# Patient Record
Sex: MALE | Race: WHITE | NOT HISPANIC OR LATINO
[De-identification: names, ages, dates, MRNs, and addresses within clinical notes are randomized per-mention and may not be internally consistent; named-entity substitution may affect disease eponyms.]

---

## 2017-04-10 PROBLEM — Z00.00 ENCOUNTER FOR PREVENTIVE HEALTH EXAMINATION: Status: ACTIVE | Noted: 2017-04-10

## 2017-04-28 ENCOUNTER — APPOINTMENT (OUTPATIENT)
Dept: PULMONOLOGY | Facility: CLINIC | Age: 67
End: 2017-04-28

## 2017-05-09 ENCOUNTER — APPOINTMENT (OUTPATIENT)
Dept: INTERVENTIONAL RADIOLOGY/VASCULAR | Facility: CLINIC | Age: 67
End: 2017-05-09

## 2017-05-09 VITALS
SYSTOLIC BLOOD PRESSURE: 129 MMHG | DIASTOLIC BLOOD PRESSURE: 71 MMHG | HEIGHT: 69 IN | HEART RATE: 77 BPM | BODY MASS INDEX: 21.48 KG/M2 | WEIGHT: 145 LBS | TEMPERATURE: 99.1 F | OXYGEN SATURATION: 95 % | RESPIRATION RATE: 18 BRPM

## 2017-05-09 DIAGNOSIS — Z78.9 OTHER SPECIFIED HEALTH STATUS: ICD-10-CM

## 2017-05-09 RX ORDER — MULTIVITAMIN
TABLET ORAL
Refills: 0 | Status: ACTIVE | COMMUNITY

## 2017-05-16 ENCOUNTER — APPOINTMENT (OUTPATIENT)
Dept: THORACIC SURGERY | Facility: CLINIC | Age: 67
End: 2017-05-16

## 2017-05-16 VITALS
SYSTOLIC BLOOD PRESSURE: 130 MMHG | OXYGEN SATURATION: 94 % | HEART RATE: 72 BPM | HEIGHT: 69 IN | RESPIRATION RATE: 16 BRPM | BODY MASS INDEX: 21.48 KG/M2 | WEIGHT: 145 LBS | DIASTOLIC BLOOD PRESSURE: 78 MMHG

## 2017-06-08 ENCOUNTER — APPOINTMENT (OUTPATIENT)
Dept: PULMONOLOGY | Facility: CLINIC | Age: 67
End: 2017-06-08

## 2017-06-08 VITALS
DIASTOLIC BLOOD PRESSURE: 60 MMHG | OXYGEN SATURATION: 97 % | RESPIRATION RATE: 17 BRPM | BODY MASS INDEX: 21.77 KG/M2 | SYSTOLIC BLOOD PRESSURE: 110 MMHG | HEART RATE: 57 BPM | WEIGHT: 147 LBS | HEIGHT: 69 IN | TEMPERATURE: 97.9 F

## 2017-06-08 RX ORDER — FLUTICASONE FUROATE AND VILANTEROL TRIFENATATE 200; 25 UG/1; UG/1
200-25 POWDER RESPIRATORY (INHALATION)
Refills: 0 | Status: DISCONTINUED | COMMUNITY
End: 2017-06-08

## 2017-06-12 ENCOUNTER — APPOINTMENT (OUTPATIENT)
Dept: PULMONOLOGY | Facility: CLINIC | Age: 67
End: 2017-06-12

## 2017-07-05 ENCOUNTER — OTHER (OUTPATIENT)
Age: 67
End: 2017-07-05

## 2017-07-11 ENCOUNTER — APPOINTMENT (OUTPATIENT)
Dept: THORACIC SURGERY | Facility: CLINIC | Age: 67
End: 2017-07-11

## 2017-07-11 VITALS
WEIGHT: 148 LBS | HEART RATE: 58 BPM | BODY MASS INDEX: 21.92 KG/M2 | SYSTOLIC BLOOD PRESSURE: 130 MMHG | HEIGHT: 69 IN | OXYGEN SATURATION: 98 % | DIASTOLIC BLOOD PRESSURE: 60 MMHG

## 2017-08-02 ENCOUNTER — OUTPATIENT (OUTPATIENT)
Dept: OUTPATIENT SERVICES | Facility: HOSPITAL | Age: 67
LOS: 1 days | End: 2017-08-02
Payer: COMMERCIAL

## 2017-08-02 VITALS
DIASTOLIC BLOOD PRESSURE: 62 MMHG | HEART RATE: 56 BPM | OXYGEN SATURATION: 99 % | TEMPERATURE: 97 F | RESPIRATION RATE: 16 BRPM | WEIGHT: 147.93 LBS | SYSTOLIC BLOOD PRESSURE: 106 MMHG | HEIGHT: 71 IN

## 2017-08-02 DIAGNOSIS — Z98.890 OTHER SPECIFIED POSTPROCEDURAL STATES: Chronic | ICD-10-CM

## 2017-08-02 DIAGNOSIS — R91.1 SOLITARY PULMONARY NODULE: ICD-10-CM

## 2017-08-02 DIAGNOSIS — Z90.89 ACQUIRED ABSENCE OF OTHER ORGANS: Chronic | ICD-10-CM

## 2017-08-02 DIAGNOSIS — Z90.49 ACQUIRED ABSENCE OF OTHER SPECIFIED PARTS OF DIGESTIVE TRACT: Chronic | ICD-10-CM

## 2017-08-02 LAB
BLD GP AB SCN SERPL QL: NEGATIVE — SIGNIFICANT CHANGE UP
RH IG SCN BLD-IMP: NEGATIVE — SIGNIFICANT CHANGE UP

## 2017-08-02 PROCEDURE — 93010 ELECTROCARDIOGRAM REPORT: CPT

## 2017-08-02 RX ORDER — SODIUM CHLORIDE 9 MG/ML
1000 INJECTION, SOLUTION INTRAVENOUS
Qty: 0 | Refills: 0 | Status: DISCONTINUED | OUTPATIENT
Start: 2017-08-07 | End: 2017-08-08

## 2017-08-02 NOTE — H&P PST ADULT - NEGATIVE CARDIOVASCULAR SYMPTOMS
no paroxysmal nocturnal dyspnea/no claudication/no chest pain/no peripheral edema/no orthopnea/no palpitations/no dyspnea on exertion

## 2017-08-02 NOTE — H&P PST ADULT - GASTROINTESTINAL DETAILS
no bruit/nontender/bowel sounds normal/no guarding/soft/no masses palpable/no rigidity/no rebound tenderness/no distention/no organomegaly

## 2017-08-02 NOTE — H&P PST ADULT - NEGATIVE OPHTHALMOLOGIC SYMPTOMS
no diplopia/no discharge R/no blurred vision L/no pain L/no loss of vision L/no discharge L/no blurred vision R/no loss of vision R/no photophobia

## 2017-08-02 NOTE — H&P PST ADULT - RS GEN PE MLT RESP DETAILS PC
no chest wall tenderness/no wheezes/good air movement/respirations non-labored/clear to auscultation bilaterally/airway patent/no rales/no rhonchi/no subcutaneous emphysema/breath sounds equal/no intercostal retractions

## 2017-08-02 NOTE — H&P PST ADULT - NEGATIVE ENMT SYMPTOMS
no tinnitus/no dry mouth/no hearing difficulty/no nasal congestion/no vertigo/no nasal discharge/no nasal obstruction/no post-nasal discharge/no throat pain/no sinus symptoms/no ear pain/no dysphagia

## 2017-08-02 NOTE — H&P PST ADULT - NEGATIVE NEUROLOGICAL SYMPTOMS
no generalized seizures/no tremors/no hemiparesis/no syncope/no confusion/no headache/no paresthesias/no loss of consciousness/no transient paralysis/no loss of sensation/no facial palsy/no weakness/no focal seizures

## 2017-08-02 NOTE — H&P PST ADULT - PROBLEM SELECTOR PLAN 1
Pt is scheduled for flexible bronchoscopy, left video assisted thoracoscopy and lung resection for 8/7/17. Preop instructions, Pepcid, surgical scrub provided. Pt stated understanding. Medical clearance in chart. Pending stress test results.

## 2017-08-02 NOTE — H&P PST ADULT - NSANTHOSAYNRD_GEN_A_CORE
No. JEANINE screening performed.  STOP BANG Legend: 0-2 = LOW Risk; 3-4 = INTERMEDIATE Risk; 5-8 = HIGH Risk

## 2017-08-02 NOTE — H&P PST ADULT - HISTORY OF PRESENT ILLNESS
66 year old male presents to presurgical testing with diagnosis of solitary pulmonary nodule scheduled for flexible bronchoscopy, left video assisted thoracoscopy and lung resection for 8/7/17. Pt reports cough with green sputum onset in 1/2017, treated with antibiotics, with persistent symptoms. CXR revealing COPD, referred for CT chest revealing pulmonary nodules. Pt denies SOB with exertion. PFT done.

## 2017-08-02 NOTE — H&P PST ADULT - PSH
H/O Spinal surgery  L4-L5 5/2006  History of tonsillectomy    S/P scrotal varicocelectomy  two procedures 1985 and 1986

## 2017-08-02 NOTE — H&P PST ADULT - PMH
COPD (chronic obstructive pulmonary disease)    Genital herpes    HLD (hyperlipidemia)    Lumbar disc herniation  s/p lumbar surgery L4-L5  Neutropenia  stable, no intervention  Solitary pulmonary nodule    Thyroid nodule  found incidentally on CT scan COPD (chronic obstructive pulmonary disease)    Genital herpes    HLD (hyperlipidemia)    Lumbar disc herniation  s/p lumbar surgery L4-L5  Neutropenia  longstanding, stable, no intervention  Solitary pulmonary nodule    Thyroid nodule  found incidentally on CT scan

## 2017-08-02 NOTE — H&P PST ADULT - MUSCULOSKELETAL
details… detailed exam normal strength/ROM intact/no joint warmth/no joint erythema/no calf tenderness/no joint swelling

## 2017-08-02 NOTE — H&P PST ADULT - FAMILY HISTORY
Mother  Still living? No  Family history of COPD (chronic obstructive pulmonary disease), Age at diagnosis: Age Unknown

## 2017-08-02 NOTE — H&P PST ADULT - NEGATIVE GENERAL SYMPTOMS
no polydipsia/no fever/no sweating/no malaise/no weight gain/no polyphagia/no weight loss/no anorexia/no fatigue/no polyuria/no chills

## 2017-08-02 NOTE — H&P PST ADULT - NEGATIVE GENERAL GENITOURINARY SYMPTOMS
no dysuria/no incontinence/no flank pain R/no hematuria/normal urinary frequency/no renal colic/no flank pain L/no bladder infections/no urinary hesitancy

## 2017-08-07 ENCOUNTER — RESULT REVIEW (OUTPATIENT)
Age: 67
End: 2017-08-07

## 2017-08-07 ENCOUNTER — INPATIENT (INPATIENT)
Facility: HOSPITAL | Age: 67
LOS: 1 days | Discharge: HOME HEALTH | End: 2017-08-09
Attending: THORACIC SURGERY (CARDIOTHORACIC VASCULAR SURGERY) | Admitting: THORACIC SURGERY (CARDIOTHORACIC VASCULAR SURGERY)
Payer: COMMERCIAL

## 2017-08-07 ENCOUNTER — APPOINTMENT (OUTPATIENT)
Dept: THORACIC SURGERY | Facility: HOSPITAL | Age: 67
End: 2017-08-07
Payer: COMMERCIAL

## 2017-08-07 ENCOUNTER — TRANSCRIPTION ENCOUNTER (OUTPATIENT)
Age: 67
End: 2017-08-07

## 2017-08-07 VITALS
HEART RATE: 58 BPM | OXYGEN SATURATION: 97 % | SYSTOLIC BLOOD PRESSURE: 133 MMHG | WEIGHT: 147.93 LBS | HEIGHT: 71 IN | DIASTOLIC BLOOD PRESSURE: 58 MMHG | TEMPERATURE: 98 F | RESPIRATION RATE: 15 BRPM

## 2017-08-07 DIAGNOSIS — Z98.890 OTHER SPECIFIED POSTPROCEDURAL STATES: Chronic | ICD-10-CM

## 2017-08-07 DIAGNOSIS — Z90.89 ACQUIRED ABSENCE OF OTHER ORGANS: Chronic | ICD-10-CM

## 2017-08-07 DIAGNOSIS — R91.1 SOLITARY PULMONARY NODULE: ICD-10-CM

## 2017-08-07 LAB
GRAM STN WND: SIGNIFICANT CHANGE UP
SPECIMEN SOURCE: SIGNIFICANT CHANGE UP

## 2017-08-07 PROCEDURE — 32668 THORACOSCOPY W/W RESECT DIAG: CPT

## 2017-08-07 PROCEDURE — 31622 DX BRONCHOSCOPE/WASH: CPT

## 2017-08-07 PROCEDURE — 88309 TISSUE EXAM BY PATHOLOGIST: CPT | Mod: 26

## 2017-08-07 PROCEDURE — 88307 TISSUE EXAM BY PATHOLOGIST: CPT | Mod: 26

## 2017-08-07 PROCEDURE — 32674 THORACOSCOPY LYMPH NODE EXC: CPT

## 2017-08-07 PROCEDURE — 32663 THORACOSCOPY W/LOBECTOMY: CPT

## 2017-08-07 PROCEDURE — 88331 PATH CONSLTJ SURG 1 BLK 1SPC: CPT | Mod: 26

## 2017-08-07 PROCEDURE — 71010: CPT | Mod: 26

## 2017-08-07 PROCEDURE — 88305 TISSUE EXAM BY PATHOLOGIST: CPT | Mod: 26

## 2017-08-07 RX ORDER — METOCLOPRAMIDE HCL 10 MG
10 TABLET ORAL ONCE
Qty: 0 | Refills: 0 | Status: DISCONTINUED | OUTPATIENT
Start: 2017-08-07 | End: 2017-08-07

## 2017-08-07 RX ORDER — FAMOTIDINE 10 MG/ML
20 INJECTION INTRAVENOUS DAILY
Qty: 0 | Refills: 0 | Status: DISCONTINUED | OUTPATIENT
Start: 2017-08-07 | End: 2017-08-09

## 2017-08-07 RX ORDER — SIMVASTATIN 20 MG/1
20 TABLET, FILM COATED ORAL AT BEDTIME
Qty: 0 | Refills: 0 | Status: DISCONTINUED | OUTPATIENT
Start: 2017-08-07 | End: 2017-08-09

## 2017-08-07 RX ORDER — ONDANSETRON 8 MG/1
4 TABLET, FILM COATED ORAL EVERY 6 HOURS
Qty: 0 | Refills: 0 | Status: DISCONTINUED | OUTPATIENT
Start: 2017-08-07 | End: 2017-08-09

## 2017-08-07 RX ORDER — HEPARIN SODIUM 5000 [USP'U]/ML
5000 INJECTION INTRAVENOUS; SUBCUTANEOUS EVERY 8 HOURS
Qty: 0 | Refills: 0 | Status: DISCONTINUED | OUTPATIENT
Start: 2017-08-07 | End: 2017-08-09

## 2017-08-07 RX ORDER — DIPHENHYDRAMINE HCL 50 MG
25 CAPSULE ORAL EVERY 4 HOURS
Qty: 0 | Refills: 0 | Status: DISCONTINUED | OUTPATIENT
Start: 2017-08-07 | End: 2017-08-09

## 2017-08-07 RX ORDER — IPRATROPIUM/ALBUTEROL SULFATE 18-103MCG
3 AEROSOL WITH ADAPTER (GRAM) INHALATION EVERY 6 HOURS
Qty: 0 | Refills: 0 | Status: DISCONTINUED | OUTPATIENT
Start: 2017-08-07 | End: 2017-08-09

## 2017-08-07 RX ORDER — HYDROMORPHONE HYDROCHLORIDE 2 MG/ML
0.5 INJECTION INTRAMUSCULAR; INTRAVENOUS; SUBCUTANEOUS
Qty: 0 | Refills: 0 | Status: DISCONTINUED | OUTPATIENT
Start: 2017-08-07 | End: 2017-08-08

## 2017-08-07 RX ORDER — HYDROMORPHONE HYDROCHLORIDE 2 MG/ML
30 INJECTION INTRAMUSCULAR; INTRAVENOUS; SUBCUTANEOUS
Qty: 0 | Refills: 0 | Status: DISCONTINUED | OUTPATIENT
Start: 2017-08-07 | End: 2017-08-08

## 2017-08-07 RX ORDER — NALOXONE HYDROCHLORIDE 4 MG/.1ML
0.1 SPRAY NASAL
Qty: 0 | Refills: 0 | Status: DISCONTINUED | OUTPATIENT
Start: 2017-08-07 | End: 2017-08-09

## 2017-08-07 RX ORDER — ACYCLOVIR SODIUM 500 MG
200 VIAL (EA) INTRAVENOUS DAILY
Qty: 0 | Refills: 0 | Status: DISCONTINUED | OUTPATIENT
Start: 2017-08-07 | End: 2017-08-09

## 2017-08-07 RX ORDER — HEPARIN SODIUM 5000 [USP'U]/ML
5000 INJECTION INTRAVENOUS; SUBCUTANEOUS ONCE
Qty: 0 | Refills: 0 | Status: DISCONTINUED | OUTPATIENT
Start: 2017-08-07 | End: 2017-08-07

## 2017-08-07 RX ORDER — ONDANSETRON 8 MG/1
4 TABLET, FILM COATED ORAL ONCE
Qty: 0 | Refills: 0 | Status: DISCONTINUED | OUTPATIENT
Start: 2017-08-07 | End: 2017-08-07

## 2017-08-07 RX ORDER — DOCUSATE SODIUM 100 MG
100 CAPSULE ORAL THREE TIMES A DAY
Qty: 0 | Refills: 0 | Status: DISCONTINUED | OUTPATIENT
Start: 2017-08-07 | End: 2017-08-09

## 2017-08-07 RX ORDER — HYDROMORPHONE HYDROCHLORIDE 2 MG/ML
0.5 INJECTION INTRAMUSCULAR; INTRAVENOUS; SUBCUTANEOUS
Qty: 0 | Refills: 0 | Status: DISCONTINUED | OUTPATIENT
Start: 2017-08-07 | End: 2017-08-07

## 2017-08-07 RX ORDER — FENTANYL CITRATE 50 UG/ML
25 INJECTION INTRAVENOUS
Qty: 0 | Refills: 0 | Status: DISCONTINUED | OUTPATIENT
Start: 2017-08-07 | End: 2017-08-07

## 2017-08-07 RX ADMIN — HYDROMORPHONE HYDROCHLORIDE 30 MILLILITER(S): 2 INJECTION INTRAMUSCULAR; INTRAVENOUS; SUBCUTANEOUS at 11:45

## 2017-08-07 RX ADMIN — SIMVASTATIN 20 MILLIGRAM(S): 20 TABLET, FILM COATED ORAL at 23:40

## 2017-08-07 RX ADMIN — Medication 3 MILLILITER(S): at 21:54

## 2017-08-07 RX ADMIN — Medication 3 MILLILITER(S): at 16:02

## 2017-08-07 RX ADMIN — HYDROMORPHONE HYDROCHLORIDE 30 MILLILITER(S): 2 INJECTION INTRAMUSCULAR; INTRAVENOUS; SUBCUTANEOUS at 19:26

## 2017-08-07 RX ADMIN — Medication 100 MILLIGRAM(S): at 23:40

## 2017-08-07 RX ADMIN — HEPARIN SODIUM 5000 UNIT(S): 5000 INJECTION INTRAVENOUS; SUBCUTANEOUS at 23:38

## 2017-08-07 RX ADMIN — SODIUM CHLORIDE 30 MILLILITER(S): 9 INJECTION, SOLUTION INTRAVENOUS at 12:00

## 2017-08-07 RX ADMIN — HEPARIN SODIUM 5000 UNIT(S): 5000 INJECTION INTRAVENOUS; SUBCUTANEOUS at 14:15

## 2017-08-07 RX ADMIN — Medication 200 MILLIGRAM(S): at 23:40

## 2017-08-07 RX ADMIN — HYDROMORPHONE HYDROCHLORIDE 30 MILLILITER(S): 2 INJECTION INTRAMUSCULAR; INTRAVENOUS; SUBCUTANEOUS at 18:03

## 2017-08-07 NOTE — ASU PATIENT PROFILE, ADULT - PMH
COPD (chronic obstructive pulmonary disease)    Genital herpes    HLD (hyperlipidemia)    Lumbar disc herniation  s/p lumbar surgery L4-L5  Neutropenia  longstanding, stable, no intervention  Solitary pulmonary nodule    Thyroid nodule  found incidentally on CT scan

## 2017-08-07 NOTE — BRIEF OPERATIVE NOTE - PROCEDURE
Thoracoscopic lobectomy of lung  08/07/2017  Uniportal Left VATS, LLL wedge resection , LLL lobectomy, intercostal nerve block  Active  CSUMMERS

## 2017-08-08 LAB
BUN SERPL-MCNC: 13 MG/DL — SIGNIFICANT CHANGE UP (ref 7–23)
CALCIUM SERPL-MCNC: 9.1 MG/DL — SIGNIFICANT CHANGE UP (ref 8.4–10.5)
CHLORIDE SERPL-SCNC: 100 MMOL/L — SIGNIFICANT CHANGE UP (ref 98–107)
CO2 SERPL-SCNC: 28 MMOL/L — SIGNIFICANT CHANGE UP (ref 22–31)
CREAT SERPL-MCNC: 0.79 MG/DL — SIGNIFICANT CHANGE UP (ref 0.5–1.3)
CULTURE - ACID FAST SMEAR CONCENTRATED: SIGNIFICANT CHANGE UP
GLUCOSE SERPL-MCNC: 120 MG/DL — HIGH (ref 70–99)
HCT VFR BLD CALC: 42.1 % — SIGNIFICANT CHANGE UP (ref 39–50)
HGB BLD-MCNC: 13.5 G/DL — SIGNIFICANT CHANGE UP (ref 13–17)
MCHC RBC-ENTMCNC: 32.1 % — SIGNIFICANT CHANGE UP (ref 32–36)
MCHC RBC-ENTMCNC: 32.1 PG — SIGNIFICANT CHANGE UP (ref 27–34)
MCV RBC AUTO: 100.2 FL — HIGH (ref 80–100)
NRBC # FLD: 0 — SIGNIFICANT CHANGE UP
PLATELET # BLD AUTO: 164 K/UL — SIGNIFICANT CHANGE UP (ref 150–400)
PMV BLD: 11.7 FL — SIGNIFICANT CHANGE UP (ref 7–13)
POTASSIUM SERPL-MCNC: 4.6 MMOL/L — SIGNIFICANT CHANGE UP (ref 3.5–5.3)
POTASSIUM SERPL-SCNC: 4.6 MMOL/L — SIGNIFICANT CHANGE UP (ref 3.5–5.3)
RBC # BLD: 4.2 M/UL — SIGNIFICANT CHANGE UP (ref 4.2–5.8)
RBC # FLD: 13.8 % — SIGNIFICANT CHANGE UP (ref 10.3–14.5)
SODIUM SERPL-SCNC: 140 MMOL/L — SIGNIFICANT CHANGE UP (ref 135–145)
SPECIMEN SOURCE: SIGNIFICANT CHANGE UP
SPECIMEN SOURCE: SIGNIFICANT CHANGE UP
WBC # BLD: 8.54 K/UL — SIGNIFICANT CHANGE UP (ref 3.8–10.5)
WBC # FLD AUTO: 8.54 K/UL — SIGNIFICANT CHANGE UP (ref 3.8–10.5)

## 2017-08-08 PROCEDURE — 71010: CPT | Mod: 26

## 2017-08-08 RX ORDER — TAMSULOSIN HYDROCHLORIDE 0.4 MG/1
0.4 CAPSULE ORAL AT BEDTIME
Qty: 0 | Refills: 0 | Status: DISCONTINUED | OUTPATIENT
Start: 2017-08-08 | End: 2017-08-09

## 2017-08-08 RX ORDER — LANOLIN ALCOHOL/MO/W.PET/CERES
3 CREAM (GRAM) TOPICAL AT BEDTIME
Qty: 0 | Refills: 0 | Status: DISCONTINUED | OUTPATIENT
Start: 2017-08-08 | End: 2017-08-09

## 2017-08-08 RX ORDER — OXYCODONE HYDROCHLORIDE 5 MG/1
10 TABLET ORAL EVERY 4 HOURS
Qty: 0 | Refills: 0 | Status: DISCONTINUED | OUTPATIENT
Start: 2017-08-08 | End: 2017-08-09

## 2017-08-08 RX ORDER — OXYCODONE HYDROCHLORIDE 5 MG/1
5 TABLET ORAL EVERY 4 HOURS
Qty: 0 | Refills: 0 | Status: DISCONTINUED | OUTPATIENT
Start: 2017-08-08 | End: 2017-08-09

## 2017-08-08 RX ORDER — KETOROLAC TROMETHAMINE 30 MG/ML
30 SYRINGE (ML) INJECTION ONCE
Qty: 0 | Refills: 0 | Status: DISCONTINUED | OUTPATIENT
Start: 2017-08-08 | End: 2017-08-08

## 2017-08-08 RX ORDER — ACETAMINOPHEN 500 MG
650 TABLET ORAL EVERY 6 HOURS
Qty: 0 | Refills: 0 | Status: DISCONTINUED | OUTPATIENT
Start: 2017-08-08 | End: 2017-08-09

## 2017-08-08 RX ADMIN — Medication 200 MILLIGRAM(S): at 13:37

## 2017-08-08 RX ADMIN — Medication 100 MILLIGRAM(S): at 13:37

## 2017-08-08 RX ADMIN — HEPARIN SODIUM 5000 UNIT(S): 5000 INJECTION INTRAVENOUS; SUBCUTANEOUS at 13:37

## 2017-08-08 RX ADMIN — Medication 30 MILLIGRAM(S): at 11:23

## 2017-08-08 RX ADMIN — HEPARIN SODIUM 5000 UNIT(S): 5000 INJECTION INTRAVENOUS; SUBCUTANEOUS at 22:45

## 2017-08-08 RX ADMIN — Medication 3 MILLILITER(S): at 21:59

## 2017-08-08 RX ADMIN — Medication 30 MILLIGRAM(S): at 10:53

## 2017-08-08 RX ADMIN — FAMOTIDINE 20 MILLIGRAM(S): 10 INJECTION INTRAVENOUS at 13:37

## 2017-08-08 RX ADMIN — Medication 3 MILLILITER(S): at 09:37

## 2017-08-08 RX ADMIN — Medication 3 MILLILITER(S): at 16:18

## 2017-08-08 RX ADMIN — SIMVASTATIN 20 MILLIGRAM(S): 20 TABLET, FILM COATED ORAL at 22:45

## 2017-08-08 RX ADMIN — Medication 100 MILLIGRAM(S): at 22:45

## 2017-08-08 RX ADMIN — HEPARIN SODIUM 5000 UNIT(S): 5000 INJECTION INTRAVENOUS; SUBCUTANEOUS at 05:17

## 2017-08-08 RX ADMIN — Medication 100 MILLIGRAM(S): at 05:17

## 2017-08-08 RX ADMIN — TAMSULOSIN HYDROCHLORIDE 0.4 MILLIGRAM(S): 0.4 CAPSULE ORAL at 22:45

## 2017-08-08 RX ADMIN — HYDROMORPHONE HYDROCHLORIDE 30 MILLILITER(S): 2 INJECTION INTRAMUSCULAR; INTRAVENOUS; SUBCUTANEOUS at 07:17

## 2017-08-08 NOTE — DISCHARGE NOTE ADULT - INSTRUCTIONS
Resume usual diet pt is to follow up with MD as advised, take meds as ordered, call if any signs of infection at chest tube site such as fever, oozing. Eat a heart healthy diet , gradually increase activity.

## 2017-08-08 NOTE — DISCHARGE NOTE ADULT - CARE PROVIDER_API CALL
Alberto Tretn), Surgery; Thoracic Surgery  [id number] [place] [address]  [place] [address]  Phone: [phone]  Fax: [phone]    Rio Fernandez), Internal Medicine  [address] [place] [id number]  Phone: [phone]  Fax: [phone]

## 2017-08-08 NOTE — PROGRESS NOTE ADULT - SUBJECTIVE AND OBJECTIVE BOX
Anesthesia Pain Management Service    SUBJECTIVE: Patient is doing well with IV PCA and no significant problems reported.    Pain Scale Score	At rest: ___ 	With Activity: ___ 	[X ] Refer to charted pain scores    THERAPY:    [ ] IV PCA Morphine		[ ] 5 mg/mL	[ ] 1 mg/mL  [X ] IV PCA Hydromorphone	[ ] 5 mg/mL	[X ] 1 mg/mL  [ ] IV PCA Fentanyl		[ ] 50 micrograms/mL    Demand dose __0.2_ lockout __6_ (minutes) Continuous Rate _0__ Total: ___  Daily      MEDICATIONS  (STANDING):  lactated ringers. 1000 milliLiter(s) (30 mL/Hr) IV Continuous <Continuous>  heparin  Injectable 5000 Unit(s) SubCutaneous every 8 hours  docusate sodium 100 milliGRAM(s) Oral three times a day  simvastatin 20 milliGRAM(s) Oral at bedtime  acyclovir   Capsule 200 milliGRAM(s) Oral daily  famotidine    Tablet 20 milliGRAM(s) Oral daily  ALBUTerol/ipratropium for Nebulization 3 milliLiter(s) Nebulizer every 6 hours    MEDICATIONS  (PRN):  naloxone Injectable 0.1 milliGRAM(s) IV Push every 3 minutes PRN For ANY of the following changes in patient status:  A. RR LESS THAN 10 breaths per minute, B. Oxygen saturation LESS THAN 90%, C. Sedation score of 6  ondansetron Injectable 4 milliGRAM(s) IV Push every 6 hours PRN Nausea  diphenhydrAMINE   Injectable 25 milliGRAM(s) IV Push every 4 hours PRN Pruritus  oxyCODONE    IR 5 milliGRAM(s) Oral every 4 hours PRN Moderate Pain (4 - 6)  oxyCODONE    IR 10 milliGRAM(s) Oral every 4 hours PRN Severe Pain (7 - 10)  acetaminophen   Tablet. 650 milliGRAM(s) Oral every 6 hours PRN Mild Pain (1 - 3)      OBJECTIVE:    Sedation Score:	[ X] Alert	[ ] Drowsy 	[ ] Arousable	[ ] Asleep	[ ] Unresponsive    Side Effects:	[X ] None	[ ] Nausea	[ ] Vomiting	[ ] Pruritus  		[ ] Other:    Vital Signs Last 24 Hrs  T(C): 36.4 (08 Aug 2017 12:43), Max: 36.9 (07 Aug 2017 20:24)  T(F): 97.6 (08 Aug 2017 12:43), Max: 98.4 (07 Aug 2017 20:24)  HR: 55 (08 Aug 2017 12:43) (55 - 77)  BP: 146/58 (08 Aug 2017 12:43) (116/58 - 146/58)  BP(mean): --  RR: 18 (08 Aug 2017 12:43) (11 - 18)  SpO2: 90% (08 Aug 2017 12:43) (90% - 100%)    ASSESSMENT/ PLAN    Therapy to  be:	[ ] Continue   [ X] Discontinued   [X ] Change to prn Analgesics    Documentation and Verification of current medications:   [X] Done	[ ] Not done, not elligible    Comments: PRN Oral/IV opioids and/or Adjuvant medication to be ordered at this point.

## 2017-08-08 NOTE — DISCHARGE NOTE ADULT - CARE PLAN
Principal Discharge DX:	Solitary pulmonary nodule  Goal:	Wound healing; Follow up final pathology for treatment plan  Instructions for follow-up, activity and diet:	Diet and activity as above; Follow up with Dr Trent in 2 weeks.  Call for an appointment and bring a chest X-ray with you. Principal Discharge DX:	Solitary pulmonary nodule  Goal:	Wound healing; Follow up final pathology for treatment plan  Instructions for follow-up, activity and diet:	Walk 4-5 x per day. Increase as tolerated. You may climb stairs. Continue to use incentive spirometer. You may remove all dressings tomorrow morning then begin to shower daily. Leave wounds uncovered. Suture will be removed in office.   See Dr. Trent in 2 weeks. Call for an apt. [phone]. Have a chest xray done prior to that apt and then bring those images with you.  Secondary Diagnosis:	Urinary retention  Goal:	Successful removal of urine catheter.  Instructions for follow-up, activity and diet:	Take Flomax as directed. Follow up with the [facility] next week for possible catheter removal. Call for an apt. [phone] Principal Discharge DX:	Solitary pulmonary nodule  Goal:	Wound healing; Follow up final pathology for treatment plan  Instructions for follow-up, activity and diet:	Walk 4-5 x per day. Increase as tolerated. You may climb stairs. Continue to use incentive spirometer. You may remove all dressings tomorrow morning then begin to shower daily. Leave wounds uncovered. Suture will be removed in office.   See Dr. Trent in 2 weeks. Call for an apt. [phone]. Have a chest xray done prior to that apt and then bring those images with you.  Secondary Diagnosis:	Urinary retention  Goal:	Successful removal of urine catheter.  Instructions for follow-up, activity and diet:	Take Flomax as directed. Follow up with the [facility] next week for possible catheter removal. Call for an apt. [phone] Principal Discharge DX:	Solitary pulmonary nodule  Goal:	Wound healing; Follow up final pathology for treatment plan  Instructions for follow-up, activity and diet:	Walk 4-5 x per day. Increase as tolerated. You may climb stairs. Continue to use incentive spirometer. You may remove all dressings tomorrow morning then begin to shower daily. Leave wounds uncovered. Suture will be removed in office.   See Dr. Trent in 2 weeks. Call for an apt. [phone]. Have a chest xray done prior to that apt and then bring those images with you.  Secondary Diagnosis:	Urinary retention  Goal:	Successful removal of urine catheter.  Instructions for follow-up, activity and diet:	Take Flomax as directed. Follow up with the [facility] next week for possible catheter removal. Call for an apt. [phone] Principal Discharge DX:	Solitary pulmonary nodule  Goal:	Wound healing; Follow up final pathology for treatment plan  Instructions for follow-up, activity and diet:	Walk 4-5 x per day. Increase as tolerated. You may climb stairs. Continue to use incentive spirometer. You may remove all dressings tomorrow morning then begin to shower daily. Leave wounds uncovered. Suture will be removed in office.   See Dr. Trent in 2 weeks. Call for an apt. [phone]. Have a chest xray done prior to that apt and then bring those images with you.  Secondary Diagnosis:	Urinary retention  Goal:	Successful removal of urine catheter.  Instructions for follow-up, activity and diet:	Take Flomax as directed. Follow up with the [facility] next week for possible catheter removal. Call for an apt. [phone] Principal Discharge DX:	Solitary pulmonary nodule  Goal:	Wound healing; Follow up final pathology for treatment plan  Instructions for follow-up, activity and diet:	Walk 4-5 x per day. Increase as tolerated. You may climb stairs. Continue to use incentive spirometer. You may remove all dressings tomorrow morning then begin to shower daily. Leave wounds uncovered. Suture will be removed in office.   See Dr. Trent in 2 weeks. Call for an apt. [phone]. Have a chest xray done prior to that apt and then bring those images with you.  Secondary Diagnosis:	Urinary retention  Goal:	Successful removal of urine catheter.  Instructions for follow-up, activity and diet:	Take Flomax as directed. Follow up with the [facility] next week for possible catheter removal. Call for an apt. [phone] Principal Discharge DX:	Solitary pulmonary nodule  Goal:	Wound healing; Follow up final pathology for treatment plan  Instructions for follow-up, activity and diet:	Walk 4-5 x per day. Increase as tolerated. You may climb stairs. Continue to use incentive spirometer. You may remove all dressings tomorrow morning then begin to shower daily. Leave wounds uncovered. Suture will be removed in office.   See Dr. Trent in 2 weeks. Call for an apt. [phone]. Have a chest xray done prior to that apt and then bring those images with you.  Secondary Diagnosis:	Urinary retention  Goal:	Successful removal of urine catheter.  Instructions for follow-up, activity and diet:	Take Flomax as directed. Follow up with the [facility] next week for possible catheter removal. Call for an apt. [phone]

## 2017-08-08 NOTE — DISCHARGE NOTE ADULT - HOME CARE AGENCY
[facility] [phone] initial visit will be day after discharge home. A nurse will call prior to the home visit.

## 2017-08-08 NOTE — DISCHARGE NOTE ADULT - CARE PROVIDERS DIRECT ADDRESSES
[email or web address]

## 2017-08-08 NOTE — PROGRESS NOTE ADULT - SUBJECTIVE AND OBJECTIVE BOX
Subjective:    Vital Signs:Hemodynamically stable and afebrile  Vital Signs Last 24 Hrs  T(C): 36.7 (08-08-17 @ 17:06), Max: 36.9 (08-07-17 @ 20:24)  T(F): 98.1 (08-08-17 @ 17:06), Max: 98.4 (08-07-17 @ 20:24)  HR: 64 (08-08-17 @ 17:06) (55 - 75)  BP: 125/47 (08-08-17 @ 17:06) (116/59 - 146/58)  RR: 18 (08-08-17 @ 17:06) (17 - 18)  SpO2: 96% (08-08-17 @ 17:06) (90% - 100%) on (O2)    Telemetry/Alarms:  General: WN/WD NAD  Neurology: Awake, nonfocal, THOMAS x 4  Eyes: Scleras clear, PERRLA/ EOMI, Gross vision intact  ENT:Gross hearing intact, grossly patent pharynx, no stridor  Neck: Neck supple, trachea midline, No JVD,   Respiratory: CTA B/L decrease Lt base,   CV: RRR, S1S2, no murmurs, rubs or gallops  Abdominal: Soft, NT, ND +BS,   Extremities: No edema, + peripheral pulses, no calf pain or tenderness  Skin: No Rashes, Hematoma, Ecchymosis  Lymphatic: No Neck, axilla, groin LAD  Psych: Oriented x 3, normal affect  Incisions: No S-S of infection  Tubes: Lt Chest tube to H20 seal no air leak + zero drainage  Relevant labs, radiology->Left chest tube position is unchanged overlying the left apex.  The lungs are clear.There is no evidence of pleural effusion or pneumothorax.  The cardiomediastinal silhouette size cannot be accurately assessed on this  projection. No pneumothorax.                 and Medications reviewed                        13.5   8.54  )-----------( 164      ( 08 Aug 2017 06:38 )             42.1     08-08    140  |  100  |  13  ----------------------------<  120<H>  4.6   |  28  |  0.79    Ca    9.1      08 Aug 2017 06:38        MEDICATIONS  (STANDING):  heparin  Injectable 5000 Unit(s) SubCutaneous every 8 hours  docusate sodium 100 milliGRAM(s) Oral three times a day  simvastatin 20 milliGRAM(s) Oral at bedtime  acyclovir   Capsule 200 milliGRAM(s) Oral daily  famotidine    Tablet 20 milliGRAM(s) Oral daily  ALBUTerol/ipratropium for Nebulization 3 milliLiter(s) Nebulizer every 6 hours  tamsulosin 0.4 milliGRAM(s) Oral at bedtime    MEDICATIONS  (PRN):  naloxone Injectable 0.1 milliGRAM(s) IV Push every 3 minutes PRN For ANY of the following changes in patient status:  A. RR LESS THAN 10 breaths per minute, B. Oxygen saturation LESS THAN 90%, C. Sedation score of 6  ondansetron Injectable 4 milliGRAM(s) IV Push every 6 hours PRN Nausea  diphenhydrAMINE   Injectable 25 milliGRAM(s) IV Push every 4 hours PRN Pruritus  oxyCODONE    IR 5 milliGRAM(s) Oral every 4 hours PRN Moderate Pain (4 - 6)  oxyCODONE    IR 10 milliGRAM(s) Oral every 4 hours PRN Severe Pain (7 - 10)  acetaminophen   Tablet. 650 milliGRAM(s) Oral every 6 hours PRN Mild Pain (1 - 3)    Pertinent Physical Exam  I&O's Summary    07 Aug 2017 07:01  -  08 Aug 2017 07:00  --------------------------------------------------------  IN: 500 mL / OUT: 500 mL / NET: 0 mL    08 Aug 2017 07:01  -  08 Aug 2017 20:14  --------------------------------------------------------  IN: 0 mL / OUT: 1020 mL / NET: -1020 mL        Assessment  66y Male  w/ PAST MEDICAL & SURGICAL HISTORY:  Thyroid nodule: found incidentally on CT scan  Solitary pulmonary nodule  COPD (chronic obstructive pulmonary disease)  Lumbar disc herniation: s/p lumbar surgery L4-L5  Neutropenia: longstanding, stable, no intervention  Genital herpes  HLD (hyperlipidemia)  S/P scrotal varicocelectomy: two procedures 1985 and 1986  History of tonsillectomy  H/O Spinal surgery: L4-L5 5/2006  admitted with complaints of Patient is a 66y old  Male who presents with a chief complaint of Left VATS for a pulmonary nodule (08 Aug 2017 00:34)  .  On 8/7/17, patient underwent Thoracoscopic Lt.lobectomy   Postoperative course/issues: On POD#1 Pt voided 100ml, 250ml, of urine and c/o pain. Bladder scan ->900ml of urine. La insertion failed x3. Urology consult called and a #18F coude placed by resident without difficulty. Lt chest tube d/c'd, a follow up cxr -> sm Lt apical ptx. Will repeat cxr in am.      PLAN  D/C Chest tube w f/u cxr  Neuro: Pain management  Pulm: Encourage coughing, deep breathing and use of incentive spirometry. Wean off supplemental oxygen as able. Daily CXR.   Cardio: Monitor telemetry/alarms  GI: Tolerating diet. Continue stool softeners.  Renal: monitor urine output, supplement electrolytes as needed  Vasc: Heparin SC/SCDs for DVT prophylaxis  Heme: Stable H/H. .   ID: Off antibiotics. Stable.  Therapy: OOB/ambulate  Tubes: Monitor Chest tube output  Disposition: Aim to D/C to home on 8/9 w la.  Discussed with Cardiothoracic Team at AM rounds.

## 2017-08-08 NOTE — DISCHARGE NOTE ADULT - HOSPITAL COURSE
66 year old male was diagnosed with a solitary pulmonary nodule and underwent a flexible bronchoscopy, left video assisted thoracoscopy,  LLL wedge resection, and LLLobectomy on 8/7/17. Pt reports having had a cough with green sputum in 1/2017 that persisted despite treatment with antibiotics.  A CXR revealed COPD and a CT chest revealed a pulmonary nodule. Post-op the pt did well and was discharged home after his chest tube was removed. 66 year old male was diagnosed with a solitary pulmonary nodule and underwent a flexible bronchoscopy, left video assisted thoracoscopy,  LLL wedge resection, and LLLobectomy on 8/7/17. Pt reports having had a cough with green sputum in 1/2017 that persisted despite treatment with antibiotics.  A CXR revealed COPD and a CT chest revealed a pulmonary nodule. Post-op pt had urinary retention, Camacho cath needed to be inserted by Urology. Recommended that he be discharged home onFlomax with plans for TOV as outpt. CT was removed and CXR was stable. Pt. ambulating frequently. C.o some phlegm and rhonchi, relieved w chest PT and cough. VATS site c/d/i. Labs stable. Pt. given teaching prior to d/c home. Cleared for discharge by Dr. Trent.

## 2017-08-08 NOTE — DISCHARGE NOTE ADULT - PATIENT PORTAL LINK FT
“You can access the [facility] Patient Portal, offered by [facility], by registering with the following website: [email or web address]”

## 2017-08-08 NOTE — PROCEDURE NOTE - NSPROCDETAILS_GEN_ALL_CORE
sterile technique, non-indwelling urinary device inserted/a urinary catheter insertion kit was used for all insertion materials/prior to placement, an active physician order for the placement of a urinary catheter was verified/sterile technique, indwelling urinary device inserted

## 2017-08-08 NOTE — PROCEDURE NOTE - NSURITECHNIQUE_GU_A_CORE
Sterile gloves were worn for the duration of the procedure/The catheter was appropriately lubricated/The collection bag is below the level of the patient and urinary bladder/All applicable medical record documentation is completed/A sterile drape was used to cover all adjacent areas/Proper hand hygiene was performed/The site was cleaned with soap/water and sterile solution (betadine)/The catheter was secured with a securement device (e.g. StatLock)/The urinary drainage system is closed at the end of the procedure

## 2017-08-08 NOTE — PROGRESS NOTE ADULT - SUBJECTIVE AND OBJECTIVE BOX
Pt is without complaints s/p L VATS, LLL wedge resection, LLLobectomy.  He voided and is tolerating his diet    On exam, dressings are clean and dry.  Lungs are clear and the chest tube is to water seal without an air leak and without drainage.     CXR- no PTX    Stable s/p L VATS, LLL wedge, LLLobectomy    Plan for repeat CXR in AM 8/8  Possible chest tube removal and possible discharge home 8/8 or 8/9

## 2017-08-08 NOTE — DISCHARGE NOTE ADULT - MEDICATION SUMMARY - MEDICATIONS TO STOP TAKING
I will STOP taking the medications listed below when I get home from the hospital:    Aleve 220 mg oral tablet  --  by mouth once a day, As Needed, last dose 7/23

## 2017-08-08 NOTE — DISCHARGE NOTE ADULT - MEDICATION SUMMARY - MEDICATIONS TO TAKE
I will START or STAY ON the medications listed below when I get home from the hospital:    Calcium 600+D3 800 units  -- 1   once a day, am  -- Indication: For vitamin    CoQ10 200 mg oral capsule  -- 1 tab(s)  once a day, am  -- Indication: For vitamin    Advil 200 mg oral tablet  -- 2 tab(s) by mouth every 6 hours, As Needed  -- Indication: For pain    acetaminophen 325 mg oral tablet  -- 2 tab(s) by mouth every 6 hours, As needed, Mild Pain (1 - 3)  -- Indication: For pain    aspirin 325 mg oral tablet  -- 1 tab(s) by mouth once a day, As Needed  -- Indication: For pain    acetaminophen-oxycodone 325 mg-5 mg oral tablet  -- 2 tab(s) by mouth every 4 hours, As Needed for severe pain. Can take 1 tab for moderate pain. MDD:10  -- Caution federal law prohibits the transfer of this drug to any person other  than the person for whom it was prescribed.  May cause drowsiness.  Alcohol may intensify this effect.  Use care when operating dangerous machinery.  This prescription cannot be refilled.  This product contains acetaminophen.  Do not use  with any other product containing acetaminophen to prevent possible liver damage.  Using more of this medication than prescribed may cause serious breathing problems.    -- Indication: For pain    tamsulosin 0.4 mg oral capsule  -- 1 cap(s) by mouth once a day (at bedtime)  -- Indication: For urinary retention    Allegra 180 mg oral tablet  -- 1 tab(s) by mouth once a day, am  -- Indication: For allergies    simvastatin 20 mg oral tablet  -- 1 tab(s) by mouth once a day (at bedtime)  -- Indication: For Cholesterol    acyclovir 200 mg oral capsule  --  by mouth once a day, am  -- Indication: For anti viral     docusate sodium 100 mg oral capsule  -- 1 cap(s) by mouth 3 times a day  -- Indication: For COnstipation    lysine 1000 mg oral tablet  -- 1 tab(s) by mouth once a day, am  -- Indication: For Supplement    Fish Oil 1000 mg oral capsule  --  by mouth once a day, am  -- Indication: For Supplement    multivitamin with minerals  -- 1 tab(s)  once a day, am  -- Indication: For vitamin

## 2017-08-08 NOTE — DISCHARGE NOTE ADULT - PLAN OF CARE
Wound healing; Follow up final pathology for treatment plan Diet and activity as above; Follow up with Dr Trent in 2 weeks.  Call for an appointment and bring a chest X-ray with you. Successful removal of urine catheter. Take Flomax as directed. Follow up with the [facility] next week for possible catheter removal. Call for an apt. [phone] Walk 4-5 x per day. Increase as tolerated. You may climb stairs. Continue to use incentive spirometer. You may remove all dressings tomorrow morning then begin to shower daily. Leave wounds uncovered. Suture will be removed in office.   See Dr. Trent in 2 weeks. Call for an apt. [phone]. Have a chest xray done prior to that apt and then bring those images with you.

## 2017-08-08 NOTE — PROGRESS NOTE ADULT - SUBJECTIVE AND OBJECTIVE BOX
Day _2_ of Anesthesia Pain Management Service    Allergies    No Known Allergies    SUBJECTIVE: " The surgical pain is ok. I just can't seem to urinate even though I feel the urge."    Pain Scale Score	At rest: _4/10_ 	With Activity: ___ 	[ ] Refer to charted pain scores    THERAPY:    [ ] IV PCA Morphine		[ ] 5 mg/mL	[ ] 1 mg/mL  [X] IV PCA Hydromorphone	[ ] 5 mg/mL	[X] 1 mg/mL  [ ] IV PCA Fentanyl		[ ] 50 micrograms/mL    Demand dose _0.2mg_ lockout _6_ (minutes) Continuous Rate _0_ Total: _6.2mg_  Daily      MEDICATIONS  (STANDING):  lactated ringers. 1000 milliLiter(s) (30 mL/Hr) IV Continuous <Continuous>  heparin  Injectable 5000 Unit(s) SubCutaneous every 8 hours  docusate sodium 100 milliGRAM(s) Oral three times a day  simvastatin 20 milliGRAM(s) Oral at bedtime  acyclovir   Capsule 200 milliGRAM(s) Oral daily  famotidine    Tablet 20 milliGRAM(s) Oral daily  ALBUTerol/ipratropium for Nebulization 3 milliLiter(s) Nebulizer every 6 hours    MEDICATIONS  (PRN):  naloxone Injectable 0.1 milliGRAM(s) IV Push every 3 minutes PRN For ANY of the following changes in patient status:  A. RR LESS THAN 10 breaths per minute, B. Oxygen saturation LESS THAN 90%, C. Sedation score of 6  ondansetron Injectable 4 milliGRAM(s) IV Push every 6 hours PRN Nausea  diphenhydrAMINE   Injectable 25 milliGRAM(s) IV Push every 4 hours PRN Pruritus  oxyCODONE    IR 5 milliGRAM(s) Oral every 4 hours PRN Moderate Pain (4 - 6)  oxyCODONE    IR 10 milliGRAM(s) Oral every 4 hours PRN Severe Pain (7 - 10)  acetaminophen   Tablet. 650 milliGRAM(s) Oral every 6 hours PRN Mild Pain (1 - 3)      OBJECTIVE: A&Ox3, NAD, supine in bed    Sedation Score:	[X] Alert	[ ] Drowsy	[ ] Arousable	[ ] Asleep	[ ] Unresponsive    Side Effects:	[X] None	[ ] Nausea	[ ] Vomiting	[ ] Pruritus  		  [ ] Weakness		[ ] Numbness	[ ] Other:                 13.5   8.54  )-----------( 164      ( 08 Aug 2017 06:38 )             42.1       08-08    140  |  100  |  13  ----------------------------<  120<H>  4.6   |  28  |  0.79    Ca    9.1      08 Aug 2017 06:38        ASSESSMENT/ PLAN    Therapy to  be:	[] Continue   [x] Discontinued   [x] Change to prn Analgesics    Documentation and Verification of current medications:  [X] Done	[ ] Not done, not eligible  [ ] Not done, reason not given    Comments:  Chest tube removed by CT surgery  Changed to oral analgesics per Team's request  r/o urinary retention relayed to CT Surgery NP

## 2017-08-08 NOTE — DISCHARGE NOTE ADULT - NS AS ACTIVITY OBS
No Heavy lifting/straining/Do not drive or operate machinery/Walking-Indoors allowed/Showering allowed/Walking-Outdoors allowed/Stairs allowed

## 2017-08-09 VITALS — OXYGEN SATURATION: 96 %

## 2017-08-09 PROCEDURE — 99238 HOSP IP/OBS DSCHRG MGMT 30/<: CPT

## 2017-08-09 PROCEDURE — 71010: CPT | Mod: 26

## 2017-08-09 RX ORDER — ACETAMINOPHEN 500 MG
2 TABLET ORAL
Qty: 0 | Refills: 0 | DISCHARGE
Start: 2017-08-09

## 2017-08-09 RX ORDER — ASPIRIN/CALCIUM CARB/MAGNESIUM 324 MG
1 TABLET ORAL
Qty: 0 | Refills: 0 | COMMUNITY

## 2017-08-09 RX ORDER — DOCUSATE SODIUM 100 MG
1 CAPSULE ORAL
Qty: 0 | Refills: 0 | COMMUNITY
Start: 2017-08-09

## 2017-08-09 RX ORDER — OXYCODONE HYDROCHLORIDE 5 MG/1
2 TABLET ORAL
Qty: 60 | Refills: 0 | OUTPATIENT
Start: 2017-08-09 | End: 2017-08-14

## 2017-08-09 RX ORDER — ACETAMINOPHEN 500 MG
1 TABLET ORAL
Qty: 0 | Refills: 0 | COMMUNITY

## 2017-08-09 RX ORDER — TAMSULOSIN HYDROCHLORIDE 0.4 MG/1
1 CAPSULE ORAL
Qty: 30 | Refills: 0 | OUTPATIENT
Start: 2017-08-09 | End: 2017-09-08

## 2017-08-09 RX ORDER — IBUPROFEN 200 MG
0 TABLET ORAL
Qty: 0 | Refills: 0 | COMMUNITY

## 2017-08-09 RX ADMIN — FAMOTIDINE 20 MILLIGRAM(S): 10 INJECTION INTRAVENOUS at 12:27

## 2017-08-09 RX ADMIN — Medication 3 MILLIGRAM(S): at 00:10

## 2017-08-09 RX ADMIN — Medication 200 MILLIGRAM(S): at 12:27

## 2017-08-09 RX ADMIN — Medication 100 MILLIGRAM(S): at 05:04

## 2017-08-09 RX ADMIN — HEPARIN SODIUM 5000 UNIT(S): 5000 INJECTION INTRAVENOUS; SUBCUTANEOUS at 05:04

## 2017-08-09 RX ADMIN — Medication 3 MILLILITER(S): at 09:35

## 2017-08-09 RX ADMIN — Medication 3 MILLILITER(S): at 03:52

## 2017-08-10 ENCOUNTER — APPOINTMENT (OUTPATIENT)
Dept: UROLOGY | Facility: CLINIC | Age: 67
End: 2017-08-10
Payer: COMMERCIAL

## 2017-08-10 ENCOUNTER — APPOINTMENT (OUTPATIENT)
Dept: UROLOGY | Facility: CLINIC | Age: 67
End: 2017-08-10

## 2017-08-10 VITALS
BODY MASS INDEX: 20.02 KG/M2 | WEIGHT: 143 LBS | SYSTOLIC BLOOD PRESSURE: 127 MMHG | TEMPERATURE: 98.6 F | OXYGEN SATURATION: 97 % | DIASTOLIC BLOOD PRESSURE: 70 MMHG | HEART RATE: 78 BPM | HEIGHT: 71 IN

## 2017-08-10 PROCEDURE — 51741 ELECTRO-UROFLOWMETRY FIRST: CPT

## 2017-08-10 PROCEDURE — 99204 OFFICE O/P NEW MOD 45 MIN: CPT | Mod: 25

## 2017-08-10 PROCEDURE — 51798 US URINE CAPACITY MEASURE: CPT | Mod: 59

## 2017-08-11 ENCOUNTER — MESSAGE (OUTPATIENT)
Age: 67
End: 2017-08-11

## 2017-08-11 ENCOUNTER — MOBILE ON CALL (OUTPATIENT)
Age: 67
End: 2017-08-11

## 2017-08-12 LAB
BACTERIA SKIN AEROBE CULT: SIGNIFICANT CHANGE UP
SURGICAL PATHOLOGY STUDY: SIGNIFICANT CHANGE UP

## 2017-08-14 ENCOUNTER — APPOINTMENT (OUTPATIENT)
Dept: UROLOGY | Facility: CLINIC | Age: 67
End: 2017-08-14

## 2017-08-14 LAB — SPECIMEN SOURCE: SIGNIFICANT CHANGE UP

## 2017-08-15 ENCOUNTER — TRANSCRIPTION ENCOUNTER (OUTPATIENT)
Age: 67
End: 2017-08-15

## 2017-08-19 ENCOUNTER — EMERGENCY (EMERGENCY)
Facility: HOSPITAL | Age: 67
LOS: 1 days | Discharge: HOME | End: 2017-08-19
Attending: EMERGENCY MEDICINE | Admitting: EMERGENCY MEDICINE
Payer: COMMERCIAL

## 2017-08-19 VITALS
HEART RATE: 69 BPM | DIASTOLIC BLOOD PRESSURE: 82 MMHG | HEIGHT: 71 IN | WEIGHT: 145.06 LBS | TEMPERATURE: 98 F | SYSTOLIC BLOOD PRESSURE: 142 MMHG | RESPIRATION RATE: 12 BRPM | OXYGEN SATURATION: 97 %

## 2017-08-19 DIAGNOSIS — S63.282A DISLOCATION OF PROXIMAL INTERPHALANGEAL JOINT OF RIGHT MIDDLE FINGER, INITIAL ENCOUNTER: ICD-10-CM

## 2017-08-19 DIAGNOSIS — S61.212A LACERATION WITHOUT FOREIGN BODY OF RIGHT MIDDLE FINGER WITHOUT DAMAGE TO NAIL, INITIAL ENCOUNTER: ICD-10-CM

## 2017-08-19 DIAGNOSIS — S02.2XXA FRACTURE OF NASAL BONES, INITIAL ENCOUNTER FOR CLOSED FRACTURE: ICD-10-CM
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
## 2017-08-19 DIAGNOSIS — Z98.890 OTHER SPECIFIED POSTPROCEDURAL STATES: Chronic | ICD-10-CM

## 2017-08-19 DIAGNOSIS — R55 SYNCOPE AND COLLAPSE: ICD-10-CM

## 2017-08-19 DIAGNOSIS — J44.9 CHRONIC OBSTRUCTIVE PULMONARY DISEASE, UNSPECIFIED: ICD-10-CM

## 2017-08-19 DIAGNOSIS — Z79.82 LONG TERM (CURRENT) USE OF ASPIRIN: ICD-10-CM

## 2017-08-19 DIAGNOSIS — S02.2XXB FRACTURE OF NASAL BONES, INITIAL ENCOUNTER FOR OPEN FRACTURE: ICD-10-CM

## 2017-08-19 DIAGNOSIS — Z90.89 ACQUIRED ABSENCE OF OTHER ORGANS: Chronic | ICD-10-CM
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
## 2017-08-19 LAB
ALBUMIN SERPL ELPH-MCNC: 3 G/DL — LOW (ref 3.3–5)
ALP SERPL-CCNC: 96 U/L — SIGNIFICANT CHANGE UP (ref 40–120)
ALT FLD-CCNC: 76 U/L — SIGNIFICANT CHANGE UP (ref 12–78)
ANION GAP SERPL CALC-SCNC: 6 MMOL/L — SIGNIFICANT CHANGE UP (ref 5–17)
APPEARANCE UR: CLEAR — SIGNIFICANT CHANGE UP
AST SERPL-CCNC: 41 U/L — SIGNIFICANT CHANGE UP (ref 15–37)
BASOPHILS # BLD AUTO: 0.1 K/UL — SIGNIFICANT CHANGE UP (ref 0–0.2)
BASOPHILS NFR BLD AUTO: 1.2 % — SIGNIFICANT CHANGE UP (ref 0–2)
BILIRUB SERPL-MCNC: 0.3 MG/DL — SIGNIFICANT CHANGE UP (ref 0.2–1.2)
BILIRUB UR-MCNC: NEGATIVE — SIGNIFICANT CHANGE UP
BUN SERPL-MCNC: 17 MG/DL — SIGNIFICANT CHANGE UP (ref 7–23)
CALCIUM SERPL-MCNC: 9.4 MG/DL — SIGNIFICANT CHANGE UP (ref 8.5–10.1)
CHLORIDE SERPL-SCNC: 100 MMOL/L — SIGNIFICANT CHANGE UP (ref 96–108)
CK SERPL-CCNC: 217 U/L — SIGNIFICANT CHANGE UP (ref 26–308)
CO2 SERPL-SCNC: 32 MMOL/L — HIGH (ref 22–31)
COLOR SPEC: YELLOW — SIGNIFICANT CHANGE UP
CREAT SERPL-MCNC: 0.82 MG/DL — SIGNIFICANT CHANGE UP (ref 0.5–1.3)
DIFF PNL FLD: NEGATIVE — SIGNIFICANT CHANGE UP
EOSINOPHIL # BLD AUTO: 0.2 K/UL — SIGNIFICANT CHANGE UP (ref 0–0.5)
EOSINOPHIL NFR BLD AUTO: 3.1 % — SIGNIFICANT CHANGE UP (ref 0–6)
GLUCOSE SERPL-MCNC: 96 MG/DL — SIGNIFICANT CHANGE UP (ref 70–99)
GLUCOSE UR QL: NEGATIVE — SIGNIFICANT CHANGE UP
HCT VFR BLD CALC: 45.4 % — SIGNIFICANT CHANGE UP (ref 39–50)
HGB BLD-MCNC: 14.8 G/DL — SIGNIFICANT CHANGE UP (ref 13–17)
INR BLD: 1.12 RATIO — SIGNIFICANT CHANGE UP (ref 0.88–1.16)
KETONES UR-MCNC: NEGATIVE — SIGNIFICANT CHANGE UP
LEUKOCYTE ESTERASE UR-ACNC: NEGATIVE — SIGNIFICANT CHANGE UP
LYMPHOCYTES # BLD AUTO: 0.9 K/UL — LOW (ref 1–3.3)
LYMPHOCYTES # BLD AUTO: 17.2 % — SIGNIFICANT CHANGE UP (ref 13–44)
MCHC RBC-ENTMCNC: 31.9 PG — SIGNIFICANT CHANGE UP (ref 27–34)
MCHC RBC-ENTMCNC: 32.6 GM/DL — SIGNIFICANT CHANGE UP (ref 32–36)
MCV RBC AUTO: 97.8 FL — SIGNIFICANT CHANGE UP (ref 80–100)
MONOCYTES # BLD AUTO: 0.4 K/UL — SIGNIFICANT CHANGE UP (ref 0–0.9)
MONOCYTES NFR BLD AUTO: 6.7 % — SIGNIFICANT CHANGE UP (ref 1–9)
NEUTROPHILS # BLD AUTO: 4 K/UL — SIGNIFICANT CHANGE UP (ref 1.8–7.4)
NEUTROPHILS NFR BLD AUTO: 71.8 % — SIGNIFICANT CHANGE UP (ref 43–77)
NITRITE UR-MCNC: NEGATIVE — SIGNIFICANT CHANGE UP
PH UR: 6 — SIGNIFICANT CHANGE UP (ref 5–8)
PLATELET # BLD AUTO: 371 K/UL — SIGNIFICANT CHANGE UP (ref 150–400)
POTASSIUM SERPL-MCNC: 4.3 MMOL/L — SIGNIFICANT CHANGE UP (ref 3.5–5.3)
POTASSIUM SERPL-SCNC: 4.3 MMOL/L — SIGNIFICANT CHANGE UP (ref 3.5–5.3)
PROT SERPL-MCNC: 7.5 G/DL — SIGNIFICANT CHANGE UP (ref 6–8.3)
PROT UR-MCNC: NEGATIVE — SIGNIFICANT CHANGE UP
PROTHROM AB SERPL-ACNC: 12.2 SEC — SIGNIFICANT CHANGE UP (ref 9.8–12.7)
RBC # BLD: 4.65 M/UL — SIGNIFICANT CHANGE UP (ref 4.2–5.8)
RBC # FLD: 11.8 % — SIGNIFICANT CHANGE UP (ref 10.3–14.5)
SODIUM SERPL-SCNC: 138 MMOL/L — SIGNIFICANT CHANGE UP (ref 135–145)
SP GR SPEC: 1.01 — SIGNIFICANT CHANGE UP (ref 1.01–1.02)
TROPONIN I SERPL-MCNC: 0.02 NG/ML — SIGNIFICANT CHANGE UP (ref 0.01–0.04)
UROBILINOGEN FLD QL: NEGATIVE — SIGNIFICANT CHANGE UP
WBC # BLD: 5.5 K/UL — SIGNIFICANT CHANGE UP (ref 3.8–10.5)
WBC # FLD AUTO: 5.5 K/UL — SIGNIFICANT CHANGE UP (ref 3.8–10.5)

## 2017-08-19 PROCEDURE — 71010: CPT | Mod: 26

## 2017-08-19 PROCEDURE — 99285 EMERGENCY DEPT VISIT HI MDM: CPT

## 2017-08-19 PROCEDURE — 70486 CT MAXILLOFACIAL W/O DYE: CPT | Mod: 26

## 2017-08-19 PROCEDURE — 73130 X-RAY EXAM OF HAND: CPT | Mod: 26,77,LT

## 2017-08-19 PROCEDURE — 70450 CT HEAD/BRAIN W/O DYE: CPT | Mod: 26

## 2017-08-19 PROCEDURE — 73130 X-RAY EXAM OF HAND: CPT | Mod: 26,LT

## 2017-08-19 PROCEDURE — 72125 CT NECK SPINE W/O DYE: CPT | Mod: 26

## 2017-08-19 RX ORDER — SODIUM CHLORIDE 9 MG/ML
1000 INJECTION INTRAMUSCULAR; INTRAVENOUS; SUBCUTANEOUS ONCE
Qty: 0 | Refills: 0 | Status: COMPLETED | OUTPATIENT
Start: 2017-08-19 | End: 2017-08-19

## 2017-08-19 RX ORDER — MORPHINE SULFATE 50 MG/1
4 CAPSULE, EXTENDED RELEASE ORAL ONCE
Qty: 0 | Refills: 0 | Status: DISCONTINUED | OUTPATIENT
Start: 2017-08-19 | End: 2017-08-19

## 2017-08-19 RX ORDER — CEPHALEXIN 500 MG
1 CAPSULE ORAL
Qty: 30 | Refills: 0 | OUTPATIENT
Start: 2017-08-19 | End: 2017-08-29

## 2017-08-19 RX ORDER — CEFAZOLIN SODIUM 1 G
1000 VIAL (EA) INJECTION ONCE
Qty: 0 | Refills: 0 | Status: COMPLETED | OUTPATIENT
Start: 2017-08-19 | End: 2017-08-19

## 2017-08-19 RX ORDER — IBUPROFEN 200 MG
600 TABLET ORAL ONCE
Qty: 0 | Refills: 0 | Status: COMPLETED | OUTPATIENT
Start: 2017-08-19 | End: 2017-08-19

## 2017-08-19 RX ADMIN — SODIUM CHLORIDE 1000 MILLILITER(S): 9 INJECTION INTRAMUSCULAR; INTRAVENOUS; SUBCUTANEOUS at 19:37

## 2017-08-19 RX ADMIN — SODIUM CHLORIDE 1000 MILLILITER(S): 9 INJECTION INTRAMUSCULAR; INTRAVENOUS; SUBCUTANEOUS at 16:18

## 2017-08-19 RX ADMIN — Medication 600 MILLIGRAM(S): at 19:54

## 2017-08-19 RX ADMIN — Medication 100 MILLIGRAM(S): at 19:37

## 2017-08-19 NOTE — CONSULT NOTE ADULT - ASSESSMENT
66 year old male with Recent dx of solitary pulmonary nodule and underwent a flexible bronchoscopy, left video assisted thoracoscopy,  LLL wedge resection, and LLL lobectomy on 8/7/17, here with syncopal episode.   Of note, his wife reports a history of low blood pressure when standing up, for which he has needed to hold on to the side of the bed.  His event sounds vagal, in the setting of dehydration and orthostatic hypotension. He was also recently started on Flomax, which probably contributed to the orthostatic symptoms.  I would watch him on telemetry overnight but I doubt ischemia or bradyarrhythmia as the cause of his syncope.  EKG with no sign of ischemia. Would send CE q6hr x 2.  I would probably try to avoid flomax in the future, but this should be decided by his outpatient urologist.  Check orthostatics  Will follow while he is admitted to hospital.

## 2017-08-19 NOTE — ED PROVIDER NOTE - MEDICAL DECISION MAKING DETAILS
66 male with vasovagal syncope, facial and finger trauma, f/u labs, ekg, ct head, c-spine, maxillofacial, xray hand, if fluids, requesting plastics

## 2017-08-19 NOTE — ED PROVIDER NOTE - PHYSICAL EXAMINATION
nasal deformity, left 3rd digit deformity, with laceration over proximal middle phalanx 1cm, cap refill < 2 seconds

## 2017-08-19 NOTE — ED ADULT NURSE NOTE - OBJECTIVE STATEMENT
pt states he got up off couch and took a few steps and fainted. pt with injury to nose and to left 3rd finger. pt denies being dizzy prior to fall, states " this has happened to me before because I have a low heartrate because I run"

## 2017-08-19 NOTE — ED ADULT TRIAGE NOTE - CHIEF COMPLAINT QUOTE
patient with unwitnessed syncopal episode today, sustained laceration to nose and, laceration and deformity to left hand third digit

## 2017-08-19 NOTE — ED PROVIDER NOTE - CARE PLAN
Principal Discharge DX:	Vasovagal syncope  Secondary Diagnosis:	Open fracture of nasal bone, initial encounter Principal Discharge DX:	Vasovagal syncope  Secondary Diagnosis:	Open fracture of nasal bone, initial encounter  Secondary Diagnosis:	Laceration of left middle finger without foreign body, nail damage status unspecified, initial encounter

## 2017-08-19 NOTE — ED PROVIDER NOTE - SECONDARY DIAGNOSIS.
Open fracture of nasal bone, initial encounter Laceration of left middle finger without foreign body, nail damage status unspecified, initial encounter

## 2017-08-19 NOTE — ED PROVIDER NOTE - OBJECTIVE STATEMENT
66 male presents to ER with wife c/o facial trauma and left hand injury. 66 male presents to ER with wife c/o facial trauma and left hand injury. Patient states he was lying on his couch and got up quickly took a few steps, was light headed and fell to the ground passed out for a few seconds, got up quickly and c/o nasal pain and left 3rd digit deformity. Wife requesting plastics for consultation.

## 2017-08-19 NOTE — ED ADULT NURSE NOTE - NURSING SKIN WOUND APPEAR #2
Render Post-Care Instructions In Note?: no
Duration Of Freeze Thaw-Cycle (Seconds): 0
Detail Level: Detailed
Post-Care Instructions: I reviewed with the patient in detail post-care instructions. Patient is to wear sunprotection, and avoid picking at any of the treated lesions. Pt may apply Vaseline to crusted or scabbing areas.
Consent: The patient's consent was obtained including but not limited to risks of crusting, scabbing, blistering, scarring, darker or lighter pigmentary change, recurrence, incomplete removal and infection.
bleeding minimally

## 2017-08-20 ENCOUNTER — TRANSCRIPTION ENCOUNTER (OUTPATIENT)
Age: 67
End: 2017-08-20

## 2017-08-20 VITALS
HEART RATE: 73 BPM | OXYGEN SATURATION: 99 % | DIASTOLIC BLOOD PRESSURE: 78 MMHG | SYSTOLIC BLOOD PRESSURE: 139 MMHG | RESPIRATION RATE: 18 BRPM | TEMPERATURE: 98 F

## 2017-08-20 LAB
CK SERPL-CCNC: 94 U/L — SIGNIFICANT CHANGE UP (ref 26–308)
TROPONIN I SERPL-MCNC: 0.03 NG/ML — SIGNIFICANT CHANGE UP (ref 0.01–0.04)

## 2017-08-20 PROCEDURE — 85610 PROTHROMBIN TIME: CPT

## 2017-08-20 PROCEDURE — 72125 CT NECK SPINE W/O DYE: CPT

## 2017-08-20 PROCEDURE — 21337 CLOSED TX SEPTAL&NOSE FX: CPT

## 2017-08-20 PROCEDURE — 93005 ELECTROCARDIOGRAM TRACING: CPT

## 2017-08-20 PROCEDURE — 85027 COMPLETE CBC AUTOMATED: CPT

## 2017-08-20 PROCEDURE — 70486 CT MAXILLOFACIAL W/O DYE: CPT

## 2017-08-20 PROCEDURE — 96365 THER/PROPH/DIAG IV INF INIT: CPT | Mod: XU

## 2017-08-20 PROCEDURE — 80053 COMPREHEN METABOLIC PANEL: CPT

## 2017-08-20 PROCEDURE — 71045 X-RAY EXAM CHEST 1 VIEW: CPT

## 2017-08-20 PROCEDURE — 97597 DBRDMT OPN WND 1ST 20 CM/<: CPT | Mod: XU

## 2017-08-20 PROCEDURE — 81003 URINALYSIS AUTO W/O SCOPE: CPT

## 2017-08-20 PROCEDURE — 26770 TREAT FINGER DISLOCATION: CPT

## 2017-08-20 PROCEDURE — 70450 CT HEAD/BRAIN W/O DYE: CPT

## 2017-08-20 PROCEDURE — 82550 ASSAY OF CK (CPK): CPT

## 2017-08-20 PROCEDURE — 84484 ASSAY OF TROPONIN QUANT: CPT

## 2017-08-20 PROCEDURE — 12001 RPR S/N/AX/GEN/TRNK 2.5CM/<: CPT | Mod: XU

## 2017-08-20 PROCEDURE — 73130 X-RAY EXAM OF HAND: CPT

## 2017-08-20 PROCEDURE — 99285 EMERGENCY DEPT VISIT HI MDM: CPT | Mod: 25

## 2017-08-21 ENCOUNTER — TRANSCRIPTION ENCOUNTER (OUTPATIENT)
Age: 67
End: 2017-08-21

## 2017-08-21 ENCOUNTER — MOBILE ON CALL (OUTPATIENT)
Age: 67
End: 2017-08-21

## 2017-08-22 ENCOUNTER — APPOINTMENT (OUTPATIENT)
Dept: THORACIC SURGERY | Facility: CLINIC | Age: 67
End: 2017-08-22
Payer: COMMERCIAL

## 2017-08-22 VITALS
RESPIRATION RATE: 16 BRPM | HEIGHT: 71 IN | OXYGEN SATURATION: 97 % | BODY MASS INDEX: 19.88 KG/M2 | SYSTOLIC BLOOD PRESSURE: 110 MMHG | DIASTOLIC BLOOD PRESSURE: 70 MMHG | WEIGHT: 142 LBS | HEART RATE: 90 BPM

## 2017-08-22 PROCEDURE — 99024 POSTOP FOLLOW-UP VISIT: CPT

## 2017-08-24 ENCOUNTER — APPOINTMENT (OUTPATIENT)
Dept: PULMONOLOGY | Facility: CLINIC | Age: 67
End: 2017-08-24
Payer: COMMERCIAL

## 2017-08-24 VITALS
RESPIRATION RATE: 14 BRPM | TEMPERATURE: 97.8 F | SYSTOLIC BLOOD PRESSURE: 112 MMHG | DIASTOLIC BLOOD PRESSURE: 62 MMHG | HEIGHT: 71 IN | BODY MASS INDEX: 20.02 KG/M2 | OXYGEN SATURATION: 94 % | WEIGHT: 143 LBS | HEART RATE: 50 BPM

## 2017-08-24 PROCEDURE — 99212 OFFICE O/P EST SF 10 MIN: CPT

## 2017-09-05 LAB — FUNGUS SPEC QL CULT: SIGNIFICANT CHANGE UP

## 2017-09-06 ENCOUNTER — OUTPATIENT (OUTPATIENT)
Dept: OUTPATIENT SERVICES | Facility: HOSPITAL | Age: 67
LOS: 1 days | Discharge: HOME | End: 2017-09-06

## 2017-09-06 DIAGNOSIS — C34.90 MALIGNANT NEOPLASM OF UNSPECIFIED PART OF UNSPECIFIED BRONCHUS OR LUNG: ICD-10-CM

## 2017-09-06 DIAGNOSIS — Z98.890 OTHER SPECIFIED POSTPROCEDURAL STATES: Chronic | ICD-10-CM

## 2017-09-06 DIAGNOSIS — Z90.89 ACQUIRED ABSENCE OF OTHER ORGANS: Chronic | ICD-10-CM

## 2017-09-07 ENCOUNTER — APPOINTMENT (OUTPATIENT)
Dept: UROLOGY | Facility: CLINIC | Age: 67
End: 2017-09-07
Payer: COMMERCIAL

## 2017-09-07 PROCEDURE — 51798 US URINE CAPACITY MEASURE: CPT

## 2017-09-07 PROCEDURE — 99213 OFFICE O/P EST LOW 20 MIN: CPT

## 2017-09-07 PROCEDURE — 51741 ELECTRO-UROFLOWMETRY FIRST: CPT

## 2017-09-08 ENCOUNTER — LABORATORY RESULT (OUTPATIENT)
Age: 67
End: 2017-09-08

## 2017-09-08 ENCOUNTER — RESULT REVIEW (OUTPATIENT)
Age: 67
End: 2017-09-08

## 2017-09-08 ENCOUNTER — APPOINTMENT (OUTPATIENT)
Dept: HEMATOLOGY ONCOLOGY | Facility: CLINIC | Age: 67
End: 2017-09-08
Payer: COMMERCIAL

## 2017-09-08 VITALS
TEMPERATURE: 97.9 F | OXYGEN SATURATION: 100 % | WEIGHT: 141.1 LBS | SYSTOLIC BLOOD PRESSURE: 116 MMHG | DIASTOLIC BLOOD PRESSURE: 64 MMHG | HEART RATE: 61 BPM | BODY MASS INDEX: 19.97 KG/M2 | RESPIRATION RATE: 16 BRPM | HEIGHT: 70.47 IN

## 2017-09-08 LAB
BASOPHILS # BLD AUTO: 0 K/UL — SIGNIFICANT CHANGE UP (ref 0–0.2)
BASOPHILS NFR BLD AUTO: 0.6 % — SIGNIFICANT CHANGE UP (ref 0–2)
EOSINOPHIL # BLD AUTO: 0.3 K/UL — SIGNIFICANT CHANGE UP (ref 0–0.5)
EOSINOPHIL NFR BLD AUTO: 5.5 % — SIGNIFICANT CHANGE UP (ref 0–6)
HCT VFR BLD CALC: 43.4 % — SIGNIFICANT CHANGE UP (ref 39–50)
HGB BLD-MCNC: 14.8 G/DL — SIGNIFICANT CHANGE UP (ref 13–17)
LYMPHOCYTES # BLD AUTO: 1.1 K/UL — SIGNIFICANT CHANGE UP (ref 1–3.3)
LYMPHOCYTES # BLD AUTO: 21.9 % — SIGNIFICANT CHANGE UP (ref 13–44)
MCHC RBC-ENTMCNC: 33 PG — SIGNIFICANT CHANGE UP (ref 27–34)
MCHC RBC-ENTMCNC: 34 G/DL — SIGNIFICANT CHANGE UP (ref 32–36)
MCV RBC AUTO: 97 FL — SIGNIFICANT CHANGE UP (ref 80–100)
MONOCYTES # BLD AUTO: 0.5 K/UL — SIGNIFICANT CHANGE UP (ref 0–0.9)
MONOCYTES NFR BLD AUTO: 9.6 % — SIGNIFICANT CHANGE UP (ref 2–14)
NEUTROPHILS # BLD AUTO: 3.1 K/UL — SIGNIFICANT CHANGE UP (ref 1.8–7.4)
NEUTROPHILS NFR BLD AUTO: 62.4 % — SIGNIFICANT CHANGE UP (ref 43–77)
PLATELET # BLD AUTO: 162 K/UL — SIGNIFICANT CHANGE UP (ref 150–400)
RBC # BLD: 4.48 M/UL — SIGNIFICANT CHANGE UP (ref 4.2–5.8)
RBC # FLD: 12.2 % — SIGNIFICANT CHANGE UP (ref 10.3–14.5)
WBC # BLD: 5 K/UL — SIGNIFICANT CHANGE UP (ref 3.8–10.5)
WBC # FLD AUTO: 5 K/UL — SIGNIFICANT CHANGE UP (ref 3.8–10.5)

## 2017-09-08 PROCEDURE — 99205 OFFICE O/P NEW HI 60 MIN: CPT

## 2017-09-11 DIAGNOSIS — C80.1 MALIGNANT (PRIMARY) NEOPLASM, UNSPECIFIED: ICD-10-CM

## 2017-09-18 ENCOUNTER — RESULT REVIEW (OUTPATIENT)
Age: 67
End: 2017-09-18

## 2017-09-18 LAB — ACID FAST STN SPEC: SIGNIFICANT CHANGE UP

## 2017-09-22 ENCOUNTER — APPOINTMENT (OUTPATIENT)
Dept: HEMATOLOGY ONCOLOGY | Facility: CLINIC | Age: 67
End: 2017-09-22

## 2017-09-22 ENCOUNTER — APPOINTMENT (OUTPATIENT)
Dept: INFUSION THERAPY | Facility: HOSPITAL | Age: 67
End: 2017-09-22

## 2017-09-22 ENCOUNTER — APPOINTMENT (OUTPATIENT)
Dept: HEMATOLOGY ONCOLOGY | Facility: CLINIC | Age: 67
End: 2017-09-22
Payer: COMMERCIAL

## 2017-09-22 VITALS
TEMPERATURE: 98 F | OXYGEN SATURATION: 99 % | RESPIRATION RATE: 16 BRPM | HEART RATE: 58 BPM | BODY MASS INDEX: 20.13 KG/M2 | SYSTOLIC BLOOD PRESSURE: 123 MMHG | WEIGHT: 142.2 LBS | DIASTOLIC BLOOD PRESSURE: 71 MMHG

## 2017-09-22 PROCEDURE — 99215 OFFICE O/P EST HI 40 MIN: CPT

## 2017-09-25 DIAGNOSIS — Z51.11 ENCOUNTER FOR ANTINEOPLASTIC CHEMOTHERAPY: ICD-10-CM

## 2017-10-03 ENCOUNTER — LABORATORY RESULT (OUTPATIENT)
Age: 67
End: 2017-10-03

## 2017-10-03 ENCOUNTER — RESULT REVIEW (OUTPATIENT)
Age: 67
End: 2017-10-03

## 2017-10-03 ENCOUNTER — APPOINTMENT (OUTPATIENT)
Dept: INFUSION THERAPY | Facility: HOSPITAL | Age: 67
End: 2017-10-03

## 2017-10-03 LAB
BASOPHILS # BLD AUTO: 0 K/UL — SIGNIFICANT CHANGE UP (ref 0–0.2)
BASOPHILS NFR BLD AUTO: 0.7 % — SIGNIFICANT CHANGE UP (ref 0–2)
BUN SERPL-MCNC: 16 MG/DL — SIGNIFICANT CHANGE UP (ref 7–23)
CA-I BLDA-SCNC: 1.27 MMOL/L — SIGNIFICANT CHANGE UP (ref 1.12–1.3)
CHLORIDE SERPL-SCNC: 100 MMOL/L — SIGNIFICANT CHANGE UP (ref 96–108)
CO2 SERPL-SCNC: 29 MMOL/L — SIGNIFICANT CHANGE UP (ref 22–31)
CREAT SERPL-MCNC: 0.8 MG/DL — SIGNIFICANT CHANGE UP (ref 0.5–1.3)
EOSINOPHIL # BLD AUTO: 0.1 K/UL — SIGNIFICANT CHANGE UP (ref 0–0.5)
EOSINOPHIL NFR BLD AUTO: 3.7 % — SIGNIFICANT CHANGE UP (ref 0–6)
GLUCOSE SERPL-MCNC: 106 MG/DL — HIGH (ref 70–99)
HCT VFR BLD CALC: 45 % — SIGNIFICANT CHANGE UP (ref 39–50)
HGB BLD-MCNC: 14.9 G/DL — SIGNIFICANT CHANGE UP (ref 13–17)
LYMPHOCYTES # BLD AUTO: 0.8 K/UL — LOW (ref 1–3.3)
LYMPHOCYTES # BLD AUTO: 19.3 % — SIGNIFICANT CHANGE UP (ref 13–44)
MCHC RBC-ENTMCNC: 31.9 PG — SIGNIFICANT CHANGE UP (ref 27–34)
MCHC RBC-ENTMCNC: 33.2 G/DL — SIGNIFICANT CHANGE UP (ref 32–36)
MCV RBC AUTO: 96 FL — SIGNIFICANT CHANGE UP (ref 80–100)
MONOCYTES # BLD AUTO: 0.4 K/UL — SIGNIFICANT CHANGE UP (ref 0–0.9)
MONOCYTES NFR BLD AUTO: 10.5 % — SIGNIFICANT CHANGE UP (ref 2–14)
NEUTROPHILS # BLD AUTO: 2.6 K/UL — SIGNIFICANT CHANGE UP (ref 1.8–7.4)
NEUTROPHILS NFR BLD AUTO: 65.9 % — SIGNIFICANT CHANGE UP (ref 43–77)
PLATELET # BLD AUTO: 178 K/UL — SIGNIFICANT CHANGE UP (ref 150–400)
POTASSIUM SERPL-MCNC: 4.6 MMOL/L — SIGNIFICANT CHANGE UP (ref 3.5–5.3)
POTASSIUM SERPL-SCNC: 4.6 MMOL/L — SIGNIFICANT CHANGE UP (ref 3.5–5.3)
RBC # BLD: 4.68 M/UL — SIGNIFICANT CHANGE UP (ref 4.2–5.8)
RBC # FLD: 14.1 % — SIGNIFICANT CHANGE UP (ref 10.3–14.5)
SODIUM SERPL-SCNC: 139 MMOL/L — SIGNIFICANT CHANGE UP (ref 135–145)
WBC # BLD: 3.9 K/UL — SIGNIFICANT CHANGE UP (ref 3.8–10.5)
WBC # FLD AUTO: 3.9 K/UL — SIGNIFICANT CHANGE UP (ref 3.8–10.5)

## 2017-10-03 RX ORDER — ACYCLOVIR SODIUM 500 MG
0 VIAL (EA) INTRAVENOUS
Qty: 0 | Refills: 0 | COMMUNITY

## 2017-10-03 RX ORDER — OMEGA-3 ACID ETHYL ESTERS 1 G
0 CAPSULE ORAL
Qty: 0 | Refills: 0 | COMMUNITY

## 2017-10-04 DIAGNOSIS — Z51.89 ENCOUNTER FOR OTHER SPECIFIED AFTERCARE: ICD-10-CM

## 2017-10-04 DIAGNOSIS — R11.2 NAUSEA WITH VOMITING, UNSPECIFIED: ICD-10-CM

## 2017-10-06 ENCOUNTER — OUTPATIENT (OUTPATIENT)
Dept: OUTPATIENT SERVICES | Facility: HOSPITAL | Age: 67
LOS: 1 days | Discharge: HOME | End: 2017-10-06

## 2017-10-06 DIAGNOSIS — Z90.89 ACQUIRED ABSENCE OF OTHER ORGANS: Chronic | ICD-10-CM

## 2017-10-06 DIAGNOSIS — C34.90 MALIGNANT NEOPLASM OF UNSPECIFIED PART OF UNSPECIFIED BRONCHUS OR LUNG: ICD-10-CM

## 2017-10-06 DIAGNOSIS — Z98.890 OTHER SPECIFIED POSTPROCEDURAL STATES: Chronic | ICD-10-CM

## 2017-10-06 DIAGNOSIS — C80.1 MALIGNANT (PRIMARY) NEOPLASM, UNSPECIFIED: ICD-10-CM

## 2017-10-06 DIAGNOSIS — Z90.2 ACQUIRED ABSENCE OF LUNG [PART OF]: Chronic | ICD-10-CM

## 2017-10-10 ENCOUNTER — APPOINTMENT (OUTPATIENT)
Dept: INFUSION THERAPY | Facility: HOSPITAL | Age: 67
End: 2017-10-10

## 2017-10-11 DIAGNOSIS — E86.0 DEHYDRATION: ICD-10-CM

## 2017-10-17 ENCOUNTER — APPOINTMENT (OUTPATIENT)
Dept: HEMATOLOGY ONCOLOGY | Facility: CLINIC | Age: 67
End: 2017-10-17
Payer: COMMERCIAL

## 2017-10-17 ENCOUNTER — LABORATORY RESULT (OUTPATIENT)
Age: 67
End: 2017-10-17

## 2017-10-17 ENCOUNTER — RESULT REVIEW (OUTPATIENT)
Age: 67
End: 2017-10-17

## 2017-10-17 ENCOUNTER — APPOINTMENT (OUTPATIENT)
Dept: INFUSION THERAPY | Facility: HOSPITAL | Age: 67
End: 2017-10-17

## 2017-10-17 VITALS
TEMPERATURE: 97.8 F | SYSTOLIC BLOOD PRESSURE: 144 MMHG | WEIGHT: 148.37 LBS | BODY MASS INDEX: 21 KG/M2 | DIASTOLIC BLOOD PRESSURE: 83 MMHG | OXYGEN SATURATION: 100 % | RESPIRATION RATE: 16 BRPM | HEART RATE: 56 BPM

## 2017-10-17 LAB
BASOPHILS # BLD AUTO: 0 K/UL — SIGNIFICANT CHANGE UP (ref 0–0.2)
BASOPHILS NFR BLD AUTO: 0.3 % — SIGNIFICANT CHANGE UP (ref 0–2)
EOSINOPHIL # BLD AUTO: 0.1 K/UL — SIGNIFICANT CHANGE UP (ref 0–0.5)
EOSINOPHIL NFR BLD AUTO: 2 % — SIGNIFICANT CHANGE UP (ref 0–6)
HCT VFR BLD CALC: 41.6 % — SIGNIFICANT CHANGE UP (ref 39–50)
HGB BLD-MCNC: 14.3 G/DL — SIGNIFICANT CHANGE UP (ref 13–17)
LYMPHOCYTES # BLD AUTO: 0.9 K/UL — LOW (ref 1–3.3)
LYMPHOCYTES # BLD AUTO: 24.1 % — SIGNIFICANT CHANGE UP (ref 13–44)
MCHC RBC-ENTMCNC: 33 PG — SIGNIFICANT CHANGE UP (ref 27–34)
MCHC RBC-ENTMCNC: 34.4 G/DL — SIGNIFICANT CHANGE UP (ref 32–36)
MCV RBC AUTO: 95.7 FL — SIGNIFICANT CHANGE UP (ref 80–100)
MONOCYTES # BLD AUTO: 0.4 K/UL — SIGNIFICANT CHANGE UP (ref 0–0.9)
MONOCYTES NFR BLD AUTO: 10.7 % — SIGNIFICANT CHANGE UP (ref 2–14)
NEUTROPHILS # BLD AUTO: 2.4 K/UL — SIGNIFICANT CHANGE UP (ref 1.8–7.4)
NEUTROPHILS NFR BLD AUTO: 62.8 % — SIGNIFICANT CHANGE UP (ref 43–77)
PLATELET # BLD AUTO: 165 K/UL — SIGNIFICANT CHANGE UP (ref 150–400)
RBC # BLD: 4.35 M/UL — SIGNIFICANT CHANGE UP (ref 4.2–5.8)
RBC # FLD: 12.3 % — SIGNIFICANT CHANGE UP (ref 10.3–14.5)
WBC # BLD: 3.9 K/UL — SIGNIFICANT CHANGE UP (ref 3.8–10.5)
WBC # FLD AUTO: 3.9 K/UL — SIGNIFICANT CHANGE UP (ref 3.8–10.5)

## 2017-10-17 PROCEDURE — 99214 OFFICE O/P EST MOD 30 MIN: CPT

## 2017-10-23 ENCOUNTER — RESULT REVIEW (OUTPATIENT)
Age: 67
End: 2017-10-23

## 2017-10-23 ENCOUNTER — APPOINTMENT (OUTPATIENT)
Dept: INFUSION THERAPY | Facility: HOSPITAL | Age: 67
End: 2017-10-23

## 2017-10-23 ENCOUNTER — LABORATORY RESULT (OUTPATIENT)
Age: 67
End: 2017-10-23

## 2017-10-23 PROBLEM — N52.9 MALE ERECTILE DYSFUNCTION, UNSPECIFIED: Chronic | Status: ACTIVE | Noted: 2017-10-02

## 2017-10-23 PROBLEM — N40.1 BENIGN PROSTATIC HYPERPLASIA WITH LOWER URINARY TRACT SYMPTOMS: Chronic | Status: ACTIVE | Noted: 2017-10-02

## 2017-10-23 PROBLEM — C34.90 MALIGNANT NEOPLASM OF UNSPECIFIED PART OF UNSPECIFIED BRONCHUS OR LUNG: Chronic | Status: ACTIVE | Noted: 2017-10-02

## 2017-10-23 LAB
BASOPHILS # BLD AUTO: 0 K/UL — SIGNIFICANT CHANGE UP (ref 0–0.2)
BASOPHILS # BLD AUTO: 0 K/UL — SIGNIFICANT CHANGE UP (ref 0–0.2)
BASOPHILS NFR BLD AUTO: 0.8 % — SIGNIFICANT CHANGE UP (ref 0–2)
BASOPHILS NFR BLD AUTO: 1 % — SIGNIFICANT CHANGE UP (ref 0–2)
EOSINOPHIL # BLD AUTO: 0.1 K/UL — SIGNIFICANT CHANGE UP (ref 0–0.5)
EOSINOPHIL # BLD AUTO: 0.1 K/UL — SIGNIFICANT CHANGE UP (ref 0–0.5)
EOSINOPHIL NFR BLD AUTO: 2.4 % — SIGNIFICANT CHANGE UP (ref 0–6)
EOSINOPHIL NFR BLD AUTO: 4 % — SIGNIFICANT CHANGE UP (ref 0–6)
HCT VFR BLD CALC: 36.5 % — LOW (ref 39–50)
HCT VFR BLD CALC: 39.6 % — SIGNIFICANT CHANGE UP (ref 39–50)
HGB BLD-MCNC: 12.6 G/DL — LOW (ref 13–17)
HGB BLD-MCNC: 13.6 G/DL — SIGNIFICANT CHANGE UP (ref 13–17)
LYMPHOCYTES # BLD AUTO: 0.7 K/UL — LOW (ref 1–3.3)
LYMPHOCYTES # BLD AUTO: 0.7 K/UL — LOW (ref 1–3.3)
LYMPHOCYTES # BLD AUTO: 30.6 % — SIGNIFICANT CHANGE UP (ref 13–44)
LYMPHOCYTES # BLD AUTO: 37 % — SIGNIFICANT CHANGE UP (ref 13–44)
MCHC RBC-ENTMCNC: 32.6 PG — SIGNIFICANT CHANGE UP (ref 27–34)
MCHC RBC-ENTMCNC: 32.9 PG — SIGNIFICANT CHANGE UP (ref 27–34)
MCHC RBC-ENTMCNC: 34.4 G/DL — SIGNIFICANT CHANGE UP (ref 32–36)
MCHC RBC-ENTMCNC: 34.5 G/DL — SIGNIFICANT CHANGE UP (ref 32–36)
MCV RBC AUTO: 94.6 FL — SIGNIFICANT CHANGE UP (ref 80–100)
MCV RBC AUTO: 95.8 FL — SIGNIFICANT CHANGE UP (ref 80–100)
MONOCYTES # BLD AUTO: 0.3 K/UL — SIGNIFICANT CHANGE UP (ref 0–0.9)
MONOCYTES # BLD AUTO: 0.5 K/UL — SIGNIFICANT CHANGE UP (ref 0–0.9)
MONOCYTES NFR BLD AUTO: 20 % — HIGH (ref 2–14)
MONOCYTES NFR BLD AUTO: 21 % — HIGH (ref 2–14)
NEUTROPHILS # BLD AUTO: 0.8 K/UL — LOW (ref 1.8–7.4)
NEUTROPHILS # BLD AUTO: 1 K/UL — LOW (ref 1.8–7.4)
NEUTROPHILS NFR BLD AUTO: 38 % — LOW (ref 43–77)
NEUTROPHILS NFR BLD AUTO: 45.2 % — SIGNIFICANT CHANGE UP (ref 43–77)
PLAT MORPH BLD: NORMAL — SIGNIFICANT CHANGE UP
PLATELET # BLD AUTO: 230 K/UL — SIGNIFICANT CHANGE UP (ref 150–400)
PLATELET # BLD AUTO: 247 K/UL — SIGNIFICANT CHANGE UP (ref 150–400)
RBC # BLD: 3.81 M/UL — LOW (ref 4.2–5.8)
RBC # BLD: 4.18 M/UL — LOW (ref 4.2–5.8)
RBC # FLD: 12.3 % — SIGNIFICANT CHANGE UP (ref 10.3–14.5)
RBC # FLD: 12.4 % — SIGNIFICANT CHANGE UP (ref 10.3–14.5)
RBC BLD AUTO: SIGNIFICANT CHANGE UP
WBC # BLD: 2 K/UL — LOW (ref 3.8–10.5)
WBC # BLD: 2.2 K/UL — LOW (ref 3.8–10.5)
WBC # FLD AUTO: 2 K/UL — LOW (ref 3.8–10.5)
WBC # FLD AUTO: 2.2 K/UL — LOW (ref 3.8–10.5)

## 2017-10-30 ENCOUNTER — LABORATORY RESULT (OUTPATIENT)
Age: 67
End: 2017-10-30

## 2017-10-30 ENCOUNTER — RESULT REVIEW (OUTPATIENT)
Age: 67
End: 2017-10-30

## 2017-10-30 ENCOUNTER — APPOINTMENT (OUTPATIENT)
Dept: INFUSION THERAPY | Facility: HOSPITAL | Age: 67
End: 2017-10-30

## 2017-10-30 LAB
BASOPHILS # BLD AUTO: 0 K/UL — SIGNIFICANT CHANGE UP (ref 0–0.2)
BASOPHILS NFR BLD AUTO: 0.4 % — SIGNIFICANT CHANGE UP (ref 0–2)
EOSINOPHIL # BLD AUTO: 0.1 K/UL — SIGNIFICANT CHANGE UP (ref 0–0.5)
EOSINOPHIL NFR BLD AUTO: 2.3 % — SIGNIFICANT CHANGE UP (ref 0–6)
HCT VFR BLD CALC: 40.4 % — SIGNIFICANT CHANGE UP (ref 39–50)
HGB BLD-MCNC: 14.2 G/DL — SIGNIFICANT CHANGE UP (ref 13–17)
LYMPHOCYTES # BLD AUTO: 1 K/UL — SIGNIFICANT CHANGE UP (ref 1–3.3)
LYMPHOCYTES # BLD AUTO: 23 % — SIGNIFICANT CHANGE UP (ref 13–44)
MCHC RBC-ENTMCNC: 33.7 PG — SIGNIFICANT CHANGE UP (ref 27–34)
MCHC RBC-ENTMCNC: 35.1 G/DL — SIGNIFICANT CHANGE UP (ref 32–36)
MCV RBC AUTO: 96.1 FL — SIGNIFICANT CHANGE UP (ref 80–100)
MONOCYTES # BLD AUTO: 0.6 K/UL — SIGNIFICANT CHANGE UP (ref 0–0.9)
MONOCYTES NFR BLD AUTO: 12.8 % — SIGNIFICANT CHANGE UP (ref 2–14)
NEUTROPHILS # BLD AUTO: 2.7 K/UL — SIGNIFICANT CHANGE UP (ref 1.8–7.4)
NEUTROPHILS NFR BLD AUTO: 61.5 % — SIGNIFICANT CHANGE UP (ref 43–77)
PLATELET # BLD AUTO: 199 K/UL — SIGNIFICANT CHANGE UP (ref 150–400)
RBC # BLD: 4.2 M/UL — SIGNIFICANT CHANGE UP (ref 4.2–5.8)
RBC # FLD: 12.7 % — SIGNIFICANT CHANGE UP (ref 10.3–14.5)
WBC # BLD: 4.4 K/UL — SIGNIFICANT CHANGE UP (ref 3.8–10.5)
WBC # FLD AUTO: 4.4 K/UL — SIGNIFICANT CHANGE UP (ref 3.8–10.5)

## 2017-10-31 DIAGNOSIS — R11.2 NAUSEA WITH VOMITING, UNSPECIFIED: ICD-10-CM

## 2017-10-31 DIAGNOSIS — Z51.11 ENCOUNTER FOR ANTINEOPLASTIC CHEMOTHERAPY: ICD-10-CM

## 2017-11-02 ENCOUNTER — OUTPATIENT (OUTPATIENT)
Dept: OUTPATIENT SERVICES | Facility: HOSPITAL | Age: 67
LOS: 1 days | Discharge: HOME | End: 2017-11-02

## 2017-11-02 DIAGNOSIS — C80.1 MALIGNANT (PRIMARY) NEOPLASM, UNSPECIFIED: ICD-10-CM

## 2017-11-02 DIAGNOSIS — Z90.89 ACQUIRED ABSENCE OF OTHER ORGANS: Chronic | ICD-10-CM

## 2017-11-02 DIAGNOSIS — Z98.890 OTHER SPECIFIED POSTPROCEDURAL STATES: Chronic | ICD-10-CM

## 2017-11-02 DIAGNOSIS — Z90.2 ACQUIRED ABSENCE OF LUNG [PART OF]: Chronic | ICD-10-CM

## 2017-11-06 ENCOUNTER — APPOINTMENT (OUTPATIENT)
Dept: INFUSION THERAPY | Facility: HOSPITAL | Age: 67
End: 2017-11-06

## 2017-11-07 DIAGNOSIS — E86.0 DEHYDRATION: ICD-10-CM

## 2017-11-13 ENCOUNTER — APPOINTMENT (OUTPATIENT)
Dept: INFUSION THERAPY | Facility: HOSPITAL | Age: 67
End: 2017-11-13

## 2017-11-14 ENCOUNTER — RESULT REVIEW (OUTPATIENT)
Age: 67
End: 2017-11-14

## 2017-11-14 ENCOUNTER — APPOINTMENT (OUTPATIENT)
Dept: INFUSION THERAPY | Facility: HOSPITAL | Age: 67
End: 2017-11-14

## 2017-11-14 ENCOUNTER — APPOINTMENT (OUTPATIENT)
Dept: HEMATOLOGY ONCOLOGY | Facility: CLINIC | Age: 67
End: 2017-11-14
Payer: COMMERCIAL

## 2017-11-14 VITALS
BODY MASS INDEX: 20.97 KG/M2 | DIASTOLIC BLOOD PRESSURE: 81 MMHG | SYSTOLIC BLOOD PRESSURE: 134 MMHG | RESPIRATION RATE: 16 BRPM | OXYGEN SATURATION: 100 % | WEIGHT: 148.15 LBS | HEART RATE: 59 BPM | TEMPERATURE: 97.7 F

## 2017-11-14 LAB
BASOPHILS # BLD AUTO: 0 K/UL — SIGNIFICANT CHANGE UP (ref 0–0.2)
BASOPHILS NFR BLD AUTO: 0.3 % — SIGNIFICANT CHANGE UP (ref 0–2)
EOSINOPHIL # BLD AUTO: 0.1 K/UL — SIGNIFICANT CHANGE UP (ref 0–0.5)
EOSINOPHIL NFR BLD AUTO: 1.8 % — SIGNIFICANT CHANGE UP (ref 0–6)
HCT VFR BLD CALC: 37.4 % — LOW (ref 39–50)
HGB BLD-MCNC: 12.9 G/DL — LOW (ref 13–17)
LYMPHOCYTES # BLD AUTO: 0.9 K/UL — LOW (ref 1–3.3)
LYMPHOCYTES # BLD AUTO: 26 % — SIGNIFICANT CHANGE UP (ref 13–44)
MCHC RBC-ENTMCNC: 33.4 PG — SIGNIFICANT CHANGE UP (ref 27–34)
MCHC RBC-ENTMCNC: 34.5 G/DL — SIGNIFICANT CHANGE UP (ref 32–36)
MCV RBC AUTO: 96.8 FL — SIGNIFICANT CHANGE UP (ref 80–100)
MONOCYTES # BLD AUTO: 0.5 K/UL — SIGNIFICANT CHANGE UP (ref 0–0.9)
MONOCYTES NFR BLD AUTO: 13.8 % — SIGNIFICANT CHANGE UP (ref 2–14)
NEUTROPHILS # BLD AUTO: 2.1 K/UL — SIGNIFICANT CHANGE UP (ref 1.8–7.4)
NEUTROPHILS NFR BLD AUTO: 58.2 % — SIGNIFICANT CHANGE UP (ref 43–77)
PLATELET # BLD AUTO: 206 K/UL — SIGNIFICANT CHANGE UP (ref 150–400)
RBC # BLD: 3.86 M/UL — LOW (ref 4.2–5.8)
RBC # FLD: 13.3 % — SIGNIFICANT CHANGE UP (ref 10.3–14.5)
WBC # BLD: 3.6 K/UL — LOW (ref 3.8–10.5)
WBC # FLD AUTO: 3.6 K/UL — LOW (ref 3.8–10.5)

## 2017-11-14 PROCEDURE — 99214 OFFICE O/P EST MOD 30 MIN: CPT

## 2017-11-17 LAB
ANION GAP SERPL CALC-SCNC: 22 MMOL/L
BUN SERPL-MCNC: 23 MG/DL
CALCIUM SERPL-MCNC: 9.8 MG/DL
CHLORIDE SERPL-SCNC: 98 MMOL/L
CO2 SERPL-SCNC: 21 MMOL/L
CREAT SERPL-MCNC: 0.8 MG/DL
GLUCOSE SERPL-MCNC: 97 MG/DL
POTASSIUM SERPL-SCNC: 5 MMOL/L
SODIUM SERPL-SCNC: 138 MMOL/L

## 2017-11-20 ENCOUNTER — RESULT REVIEW (OUTPATIENT)
Age: 67
End: 2017-11-20

## 2017-11-20 ENCOUNTER — LABORATORY RESULT (OUTPATIENT)
Age: 67
End: 2017-11-20

## 2017-11-20 ENCOUNTER — APPOINTMENT (OUTPATIENT)
Dept: INFUSION THERAPY | Facility: HOSPITAL | Age: 67
End: 2017-11-20

## 2017-11-20 LAB
BASOPHILS # BLD AUTO: 0.1 K/UL — SIGNIFICANT CHANGE UP (ref 0–0.2)
BASOPHILS NFR BLD AUTO: 1 % — SIGNIFICANT CHANGE UP (ref 0–2)
EOSINOPHIL # BLD AUTO: 0.2 K/UL — SIGNIFICANT CHANGE UP (ref 0–0.5)
EOSINOPHIL NFR BLD AUTO: 6 % — SIGNIFICANT CHANGE UP (ref 0–6)
HCT VFR BLD CALC: 38.2 % — LOW (ref 39–50)
HGB BLD-MCNC: 13.4 G/DL — SIGNIFICANT CHANGE UP (ref 13–17)
LG PLATELETS BLD QL AUTO: SLIGHT — SIGNIFICANT CHANGE UP
LYMPHOCYTES # BLD AUTO: 0.8 K/UL — LOW (ref 1–3.3)
LYMPHOCYTES # BLD AUTO: 39 % — SIGNIFICANT CHANGE UP (ref 13–44)
MCHC RBC-ENTMCNC: 34 PG — SIGNIFICANT CHANGE UP (ref 27–34)
MCHC RBC-ENTMCNC: 35 G/DL — SIGNIFICANT CHANGE UP (ref 32–36)
MCV RBC AUTO: 97.2 FL — SIGNIFICANT CHANGE UP (ref 80–100)
MONOCYTES # BLD AUTO: 0.6 K/UL — SIGNIFICANT CHANGE UP (ref 0–0.9)
MONOCYTES NFR BLD AUTO: 13 % — SIGNIFICANT CHANGE UP (ref 2–14)
MYELOCYTES NFR BLD: 1 % — HIGH (ref 0–0)
NEUTROPHILS # BLD AUTO: 1.1 K/UL — LOW (ref 1.8–7.4)
NEUTROPHILS NFR BLD AUTO: 40 % — LOW (ref 43–77)
PLAT MORPH BLD: NORMAL — SIGNIFICANT CHANGE UP
PLATELET # BLD AUTO: 269 K/UL — SIGNIFICANT CHANGE UP (ref 150–400)
RBC # BLD: 3.93 M/UL — LOW (ref 4.2–5.8)
RBC # FLD: 13.7 % — SIGNIFICANT CHANGE UP (ref 10.3–14.5)
RBC BLD AUTO: SIGNIFICANT CHANGE UP
WBC # BLD: 2.8 K/UL — LOW (ref 3.8–10.5)
WBC # FLD AUTO: 2.8 K/UL — LOW (ref 3.8–10.5)

## 2017-11-21 ENCOUNTER — APPOINTMENT (OUTPATIENT)
Dept: INFUSION THERAPY | Facility: HOSPITAL | Age: 67
End: 2017-11-21

## 2017-11-21 DIAGNOSIS — R11.2 NAUSEA WITH VOMITING, UNSPECIFIED: ICD-10-CM

## 2017-11-21 DIAGNOSIS — Z51.11 ENCOUNTER FOR ANTINEOPLASTIC CHEMOTHERAPY: ICD-10-CM

## 2017-11-22 DIAGNOSIS — Z51.89 ENCOUNTER FOR OTHER SPECIFIED AFTERCARE: ICD-10-CM

## 2017-11-22 DIAGNOSIS — C34.90 MALIGNANT NEOPLASM OF UNSPECIFIED PART OF UNSPECIFIED BRONCHUS OR LUNG: ICD-10-CM

## 2017-11-27 ENCOUNTER — APPOINTMENT (OUTPATIENT)
Dept: INFUSION THERAPY | Facility: HOSPITAL | Age: 67
End: 2017-11-27

## 2017-11-28 ENCOUNTER — APPOINTMENT (OUTPATIENT)
Dept: THORACIC SURGERY | Facility: CLINIC | Age: 67
End: 2017-11-28
Payer: COMMERCIAL

## 2017-11-28 ENCOUNTER — APPOINTMENT (OUTPATIENT)
Dept: INFUSION THERAPY | Facility: HOSPITAL | Age: 67
End: 2017-11-28

## 2017-11-28 VITALS
SYSTOLIC BLOOD PRESSURE: 128 MMHG | DIASTOLIC BLOOD PRESSURE: 68 MMHG | BODY MASS INDEX: 22.49 KG/M2 | HEART RATE: 72 BPM | OXYGEN SATURATION: 100 % | HEIGHT: 67.32 IN | RESPIRATION RATE: 15 BRPM | WEIGHT: 145 LBS | TEMPERATURE: 98 F

## 2017-11-28 PROCEDURE — 99215 OFFICE O/P EST HI 40 MIN: CPT

## 2017-11-28 RX ORDER — ASPIRIN 81 MG
600-200 TABLET, DELAYED RELEASE (ENTERIC COATED) ORAL
Refills: 0 | Status: DISCONTINUED | COMMUNITY
End: 2017-11-28

## 2017-11-28 RX ORDER — PEDI MULTIVIT 22/VIT D3/VIT K 1000-800
TABLET,CHEWABLE ORAL
Refills: 0 | Status: DISCONTINUED | COMMUNITY
End: 2017-11-28

## 2017-11-28 RX ORDER — AZITHROMYCIN 250 MG/1
250 TABLET, FILM COATED ORAL
Qty: 6 | Refills: 0 | Status: DISCONTINUED | COMMUNITY
Start: 2017-05-09 | End: 2017-11-28

## 2017-11-28 RX ORDER — ACYCLOVIR 200 MG/1
CAPSULE ORAL
Refills: 0 | Status: DISCONTINUED | COMMUNITY
End: 2017-11-28

## 2017-11-28 RX ORDER — SIMVASTATIN 80 MG/1
TABLET, FILM COATED ORAL
Refills: 0 | Status: DISCONTINUED | COMMUNITY
End: 2017-11-28

## 2017-11-28 RX ORDER — PSYLLIUM HUSK 0.4 G
CAPSULE ORAL
Refills: 0 | Status: DISCONTINUED | COMMUNITY
End: 2017-11-28

## 2017-11-28 RX ORDER — FEXOFENADINE HCL 60 MG
TABLET ORAL
Refills: 0 | Status: DISCONTINUED | COMMUNITY
End: 2017-11-28

## 2017-11-28 RX ORDER — FLUTICASONE FUROATE AND VILANTEROL TRIFENATATE 100; 25 UG/1; UG/1
100-25 POWDER RESPIRATORY (INHALATION)
Qty: 60 | Refills: 0 | Status: DISCONTINUED | COMMUNITY
Start: 2017-05-17 | End: 2017-11-28

## 2017-11-28 RX ORDER — TRIAMCINOLONE ACETONIDE 55 UL/1
SPRAY, METERED NASAL
Refills: 0 | Status: DISCONTINUED | COMMUNITY
End: 2017-11-28

## 2017-11-28 RX ORDER — TIOTROPIUM BROMIDE AND OLODATEROL 3.124; 2.736 UG/1; UG/1
2.5-2.5 SPRAY, METERED RESPIRATORY (INHALATION)
Refills: 0 | Status: DISCONTINUED | COMMUNITY
Start: 2017-06-08 | End: 2017-11-28

## 2017-11-28 RX ORDER — CIPROFLOXACIN HYDROCHLORIDE 500 MG/1
500 TABLET, FILM COATED ORAL
Qty: 20 | Refills: 0 | Status: DISCONTINUED | COMMUNITY
Start: 2017-03-17 | End: 2017-11-28

## 2017-11-28 RX ORDER — OMEGA-3/DHA/EPA/FISH OIL 300-1000MG
1000 CAPSULE ORAL
Refills: 0 | Status: DISCONTINUED | COMMUNITY
End: 2017-11-28

## 2017-11-28 RX ORDER — UBIDECARENONE 200 MG
200 CAPSULE ORAL
Refills: 0 | Status: DISCONTINUED | COMMUNITY
End: 2017-11-28

## 2017-11-28 RX ORDER — AZELASTINE HYDROCHLORIDE 137 UG/1
SPRAY, METERED NASAL
Refills: 0 | Status: DISCONTINUED | COMMUNITY
End: 2017-11-28

## 2017-11-30 ENCOUNTER — OUTPATIENT (OUTPATIENT)
Dept: OUTPATIENT SERVICES | Facility: HOSPITAL | Age: 67
LOS: 1 days | Discharge: HOME | End: 2017-11-30

## 2017-11-30 DIAGNOSIS — Z90.2 ACQUIRED ABSENCE OF LUNG [PART OF]: Chronic | ICD-10-CM

## 2017-11-30 DIAGNOSIS — C80.1 MALIGNANT (PRIMARY) NEOPLASM, UNSPECIFIED: ICD-10-CM

## 2017-11-30 DIAGNOSIS — Z98.890 OTHER SPECIFIED POSTPROCEDURAL STATES: Chronic | ICD-10-CM

## 2017-11-30 DIAGNOSIS — Z90.89 ACQUIRED ABSENCE OF OTHER ORGANS: Chronic | ICD-10-CM

## 2017-12-04 ENCOUNTER — APPOINTMENT (OUTPATIENT)
Dept: INFUSION THERAPY | Facility: HOSPITAL | Age: 67
End: 2017-12-04

## 2017-12-05 ENCOUNTER — APPOINTMENT (OUTPATIENT)
Dept: HEMATOLOGY ONCOLOGY | Facility: CLINIC | Age: 67
End: 2017-12-05
Payer: COMMERCIAL

## 2017-12-05 ENCOUNTER — APPOINTMENT (OUTPATIENT)
Dept: INFUSION THERAPY | Facility: HOSPITAL | Age: 67
End: 2017-12-05

## 2017-12-05 VITALS
WEIGHT: 147.71 LBS | BODY MASS INDEX: 22.91 KG/M2 | OXYGEN SATURATION: 98 % | HEART RATE: 63 BPM | SYSTOLIC BLOOD PRESSURE: 122 MMHG | TEMPERATURE: 98.4 F | DIASTOLIC BLOOD PRESSURE: 72 MMHG | RESPIRATION RATE: 16 BRPM

## 2017-12-05 DIAGNOSIS — L03.90 CELLULITIS, UNSPECIFIED: ICD-10-CM

## 2017-12-05 PROCEDURE — 99215 OFFICE O/P EST HI 40 MIN: CPT

## 2017-12-07 PROBLEM — L03.90 CELLULITIS: Status: ACTIVE | Noted: 2017-12-01

## 2017-12-11 ENCOUNTER — APPOINTMENT (OUTPATIENT)
Dept: INFUSION THERAPY | Facility: HOSPITAL | Age: 67
End: 2017-12-11

## 2017-12-11 ENCOUNTER — LABORATORY RESULT (OUTPATIENT)
Age: 67
End: 2017-12-11

## 2017-12-11 ENCOUNTER — RESULT REVIEW (OUTPATIENT)
Age: 67
End: 2017-12-11

## 2017-12-11 LAB
BASOPHILS # BLD AUTO: 0 K/UL — SIGNIFICANT CHANGE UP (ref 0–0.2)
BASOPHILS NFR BLD AUTO: 0.9 % — SIGNIFICANT CHANGE UP (ref 0–2)
EOSINOPHIL # BLD AUTO: 0.1 K/UL — SIGNIFICANT CHANGE UP (ref 0–0.5)
EOSINOPHIL NFR BLD AUTO: 2.2 % — SIGNIFICANT CHANGE UP (ref 0–6)
HCT VFR BLD CALC: 37.1 % — LOW (ref 39–50)
HGB BLD-MCNC: 13.2 G/DL — SIGNIFICANT CHANGE UP (ref 13–17)
LYMPHOCYTES # BLD AUTO: 0.9 K/UL — LOW (ref 1–3.3)
LYMPHOCYTES # BLD AUTO: 16.5 % — SIGNIFICANT CHANGE UP (ref 13–44)
MCHC RBC-ENTMCNC: 34.7 PG — HIGH (ref 27–34)
MCHC RBC-ENTMCNC: 35.6 G/DL — SIGNIFICANT CHANGE UP (ref 32–36)
MCV RBC AUTO: 97.5 FL — SIGNIFICANT CHANGE UP (ref 80–100)
MONOCYTES # BLD AUTO: 0.7 K/UL — SIGNIFICANT CHANGE UP (ref 0–0.9)
MONOCYTES NFR BLD AUTO: 13.5 % — SIGNIFICANT CHANGE UP (ref 2–14)
NEUTROPHILS # BLD AUTO: 3.5 K/UL — SIGNIFICANT CHANGE UP (ref 1.8–7.4)
NEUTROPHILS NFR BLD AUTO: 66.9 % — SIGNIFICANT CHANGE UP (ref 43–77)
PLATELET # BLD AUTO: 233 K/UL — SIGNIFICANT CHANGE UP (ref 150–400)
RBC # BLD: 3.81 M/UL — LOW (ref 4.2–5.8)
RBC # FLD: 14.5 % — SIGNIFICANT CHANGE UP (ref 10.3–14.5)
WBC # BLD: 5.3 K/UL — SIGNIFICANT CHANGE UP (ref 3.8–10.5)
WBC # FLD AUTO: 5.3 K/UL — SIGNIFICANT CHANGE UP (ref 3.8–10.5)

## 2017-12-12 DIAGNOSIS — R11.2 NAUSEA WITH VOMITING, UNSPECIFIED: ICD-10-CM

## 2017-12-12 DIAGNOSIS — E86.0 DEHYDRATION: ICD-10-CM

## 2017-12-12 DIAGNOSIS — Z51.11 ENCOUNTER FOR ANTINEOPLASTIC CHEMOTHERAPY: ICD-10-CM

## 2017-12-18 ENCOUNTER — APPOINTMENT (OUTPATIENT)
Dept: INFUSION THERAPY | Facility: HOSPITAL | Age: 67
End: 2017-12-18

## 2017-12-19 ENCOUNTER — APPOINTMENT (OUTPATIENT)
Dept: UROLOGY | Facility: CLINIC | Age: 67
End: 2017-12-19

## 2017-12-22 ENCOUNTER — APPOINTMENT (OUTPATIENT)
Dept: HEMATOLOGY ONCOLOGY | Facility: CLINIC | Age: 67
End: 2017-12-22
Payer: COMMERCIAL

## 2017-12-22 VITALS
WEIGHT: 145.06 LBS | BODY MASS INDEX: 22.5 KG/M2 | SYSTOLIC BLOOD PRESSURE: 138 MMHG | DIASTOLIC BLOOD PRESSURE: 70 MMHG | TEMPERATURE: 98.3 F | RESPIRATION RATE: 16 BRPM | OXYGEN SATURATION: 97 % | HEART RATE: 75 BPM

## 2017-12-22 DIAGNOSIS — R91.8 OTHER NONSPECIFIC ABNORMAL FINDING OF LUNG FIELD: ICD-10-CM

## 2017-12-22 PROCEDURE — 99215 OFFICE O/P EST HI 40 MIN: CPT

## 2018-01-08 ENCOUNTER — APPOINTMENT (OUTPATIENT)
Dept: PULMONOLOGY | Facility: CLINIC | Age: 68
End: 2018-01-08
Payer: COMMERCIAL

## 2018-01-08 ENCOUNTER — NON-APPOINTMENT (OUTPATIENT)
Age: 68
End: 2018-01-08

## 2018-01-08 ENCOUNTER — APPOINTMENT (OUTPATIENT)
Dept: CARDIOLOGY | Facility: CLINIC | Age: 68
End: 2018-01-08
Payer: COMMERCIAL

## 2018-01-08 VITALS
WEIGHT: 153 LBS | TEMPERATURE: 97.8 F | BODY MASS INDEX: 23.73 KG/M2 | OXYGEN SATURATION: 100 % | HEART RATE: 56 BPM | HEIGHT: 67.32 IN | SYSTOLIC BLOOD PRESSURE: 120 MMHG | DIASTOLIC BLOOD PRESSURE: 70 MMHG

## 2018-01-08 VITALS — DIASTOLIC BLOOD PRESSURE: 74 MMHG | SYSTOLIC BLOOD PRESSURE: 122 MMHG

## 2018-01-08 DIAGNOSIS — Z01.810 ENCOUNTER FOR PREPROCEDURAL CARDIOVASCULAR EXAMINATION: ICD-10-CM

## 2018-01-08 PROCEDURE — 99215 OFFICE O/P EST HI 40 MIN: CPT

## 2018-01-08 PROCEDURE — 94010 BREATHING CAPACITY TEST: CPT

## 2018-01-08 PROCEDURE — 94729 DIFFUSING CAPACITY: CPT

## 2018-01-08 PROCEDURE — 94726 PLETHYSMOGRAPHY LUNG VOLUMES: CPT

## 2018-01-08 PROCEDURE — 93000 ELECTROCARDIOGRAM COMPLETE: CPT

## 2018-01-09 ENCOUNTER — OUTPATIENT (OUTPATIENT)
Dept: OUTPATIENT SERVICES | Facility: HOSPITAL | Age: 68
LOS: 1 days | End: 2018-01-09

## 2018-01-09 ENCOUNTER — OUTPATIENT (OUTPATIENT)
Dept: OUTPATIENT SERVICES | Facility: HOSPITAL | Age: 68
LOS: 1 days | End: 2018-01-09
Payer: COMMERCIAL

## 2018-01-09 ENCOUNTER — APPOINTMENT (OUTPATIENT)
Dept: NUCLEAR MEDICINE | Facility: HOSPITAL | Age: 68
End: 2018-01-09
Payer: COMMERCIAL

## 2018-01-09 VITALS
DIASTOLIC BLOOD PRESSURE: 70 MMHG | RESPIRATION RATE: 16 BRPM | HEIGHT: 70.5 IN | HEART RATE: 56 BPM | OXYGEN SATURATION: 98 % | WEIGHT: 149.03 LBS | TEMPERATURE: 97 F | SYSTOLIC BLOOD PRESSURE: 116 MMHG

## 2018-01-09 DIAGNOSIS — Z98.890 OTHER SPECIFIED POSTPROCEDURAL STATES: Chronic | ICD-10-CM

## 2018-01-09 DIAGNOSIS — R91.1 SOLITARY PULMONARY NODULE: ICD-10-CM

## 2018-01-09 DIAGNOSIS — Z90.89 ACQUIRED ABSENCE OF OTHER ORGANS: Chronic | ICD-10-CM

## 2018-01-09 DIAGNOSIS — R91.8 OTHER NONSPECIFIC ABNORMAL FINDING OF LUNG FIELD: ICD-10-CM

## 2018-01-09 DIAGNOSIS — Z90.2 ACQUIRED ABSENCE OF LUNG [PART OF]: Chronic | ICD-10-CM

## 2018-01-09 LAB
BLD GP AB SCN SERPL QL: NEGATIVE — SIGNIFICANT CHANGE UP
BUN SERPL-MCNC: 19 MG/DL — SIGNIFICANT CHANGE UP (ref 7–23)
CALCIUM SERPL-MCNC: 9.2 MG/DL — SIGNIFICANT CHANGE UP (ref 8.4–10.5)
CHLORIDE SERPL-SCNC: 101 MMOL/L — SIGNIFICANT CHANGE UP (ref 98–107)
CO2 SERPL-SCNC: 30 MMOL/L — SIGNIFICANT CHANGE UP (ref 22–31)
CREAT SERPL-MCNC: 0.96 MG/DL — SIGNIFICANT CHANGE UP (ref 0.5–1.3)
GLUCOSE SERPL-MCNC: 86 MG/DL — SIGNIFICANT CHANGE UP (ref 70–99)
HCT VFR BLD CALC: 35.7 % — LOW (ref 39–50)
HGB BLD-MCNC: 12.1 G/DL — LOW (ref 13–17)
MCHC RBC-ENTMCNC: 33.9 % — SIGNIFICANT CHANGE UP (ref 32–36)
MCHC RBC-ENTMCNC: 34.6 PG — HIGH (ref 27–34)
MCV RBC AUTO: 102 FL — HIGH (ref 80–100)
NRBC # FLD: 0 — SIGNIFICANT CHANGE UP
PLATELET # BLD AUTO: 194 K/UL — SIGNIFICANT CHANGE UP (ref 150–400)
PMV BLD: 11.3 FL — SIGNIFICANT CHANGE UP (ref 7–13)
POTASSIUM SERPL-MCNC: 4.4 MMOL/L — SIGNIFICANT CHANGE UP (ref 3.5–5.3)
POTASSIUM SERPL-SCNC: 4.4 MMOL/L — SIGNIFICANT CHANGE UP (ref 3.5–5.3)
RBC # BLD: 3.5 M/UL — LOW (ref 4.2–5.8)
RBC # FLD: 14.6 % — HIGH (ref 10.3–14.5)
RH IG SCN BLD-IMP: NEGATIVE — SIGNIFICANT CHANGE UP
SODIUM SERPL-SCNC: 140 MMOL/L — SIGNIFICANT CHANGE UP (ref 135–145)
WBC # BLD: 4.03 K/UL — SIGNIFICANT CHANGE UP (ref 3.8–10.5)
WBC # FLD AUTO: 4.03 K/UL — SIGNIFICANT CHANGE UP (ref 3.8–10.5)

## 2018-01-09 PROCEDURE — 93010 ELECTROCARDIOGRAM REPORT: CPT

## 2018-01-09 PROCEDURE — 78597 LUNG PERFUSION DIFFERENTIAL: CPT | Mod: 26

## 2018-01-09 RX ORDER — SODIUM CHLORIDE 9 MG/ML
1000 INJECTION, SOLUTION INTRAVENOUS
Qty: 0 | Refills: 0 | Status: DISCONTINUED | OUTPATIENT
Start: 2018-01-17 | End: 2018-01-18

## 2018-01-09 RX ORDER — ASPIRIN/CALCIUM CARB/MAGNESIUM 324 MG
1 TABLET ORAL
Qty: 0 | Refills: 0 | COMMUNITY

## 2018-01-09 RX ORDER — LANOLIN ALCOHOL/MO/W.PET/CERES
1 CREAM (GRAM) TOPICAL
Qty: 0 | Refills: 0 | COMMUNITY

## 2018-01-09 RX ORDER — IBUPROFEN 200 MG
2 TABLET ORAL
Qty: 0 | Refills: 0 | COMMUNITY

## 2018-01-09 NOTE — H&P PST ADULT - FAMILY HISTORY
Mother  Still living? No  Family history of COPD (chronic obstructive pulmonary disease), Age at diagnosis: Age Unknown     Father  Still living? Yes, Estimated age:   Family history of heart disease, Age at diagnosis: Age Unknown

## 2018-01-09 NOTE — H&P PST ADULT - PMH
Adenocarcinoma of lung    Benign prostatic hyperplasia with urinary obstruction and other lower urinary tract symptoms    COPD (chronic obstructive pulmonary disease)    Erectile dysfunction of organic origin    Genital herpes    HLD (hyperlipidemia)    Lumbar disc herniation  s/p lumbar surgery L4-L5  Neutropenia  longstanding, stable, no intervention  Solitary pulmonary nodule  right and left lung  Thyroid nodule  found incidentally on CT scan

## 2018-01-09 NOTE — H&P PST ADULT - HISTORY OF PRESENT ILLNESS
67 year old male presents to presurgical testing with diagnosis of solitary pulmonary nodule scheduled for flexible bronchoscopy, Right video assisted thoracoscopy, possible Thoracotomy, right Upper Lung Resection on 01/17/18.   Pt was dx with lung cancer August 2017 s/p Left lower  lung resection for 8/7/17 and 4 round chemotherapy last being December 11, 2017.. Pt reports cough with green sputum onset in 1/2017, treated with antibiotics, with persistent symptoms. CXR revealing COPD, referred for CT chest revealing pulmonary nodules. Pt denies SOB wit	h exertion. PFT done. 67 year old male presents to presurgical testing with diagnosis of solitary pulmonary nodule scheduled for flexible bronchoscopy, Right video assisted thoracoscopy, possible Thoracotomy, right Upper Lung Resection on 01/17/18.   Pt was dx with lung cancer August 2017 s/p Left lower  lung resection for 8/7/17 and 4 round chemotherapy last being December 11, 2017.. Pt reports cough with green sputum onset in 1/2017, treated with antibiotics, with persistent symptoms. CXR revealing COPD, referred for CT chest revealing pulmonary nodules. Pt denies SOB with exertion. PFT done.

## 2018-01-09 NOTE — H&P PST ADULT - ABILITY TO HEAR (WITH HEARING AID OR HEARING APPLIANCE IF NORMALLY USED):
Problem: Pressure Ulcer, Risk for  Goal: # Skin remains intact  Outcome: Outcome Met, Continue evaluating goal progress toward completion  No S/S of pressure ulcer development noted during current shift. Will continue to monitor.    Problem: VTE, Risk for  Goal: # Absence of symptoms of venous thromboembolism  Outcome: Outcome Met, Continue evaluating goal progress toward completion  No S/S of VTE noted during current shift; SCD's applied. Will continue to monitor.    Problem: At Risk for Falls  Goal: # Patient does not fall  Outcome: Outcome Met, Continue evaluating goal progress toward completion  Patient has not fallen during current shift; bed alarm on as precaution. Patient is up with 1+walker to bathroom. Will continue to monitor.       Adequate: hears normal conversation without difficulty

## 2018-01-09 NOTE — H&P PST ADULT - VENOUS THROMBOEMBOLISM CURRENT STATUS
major surgery lasting over 3 hrs/abnormal pulmonary function (COPD) abnormal pulmonary function (COPD)/present cancer or chemotherapy/major surgery lasting over 3 hrs

## 2018-01-09 NOTE — H&P PST ADULT - LYMPHATIC
posterior cervical R/supraclavicular R/posterior cervical L/anterior cervical R/anterior cervical L/supraclavicular L

## 2018-01-09 NOTE — H&P PST ADULT - PROBLEM SELECTOR PLAN 1
Scheduled for Bronchoscopy, Right Video Assisted Thoracoscopy, Possible Thoracotomy, Right Upper Lung Resection on 01/17/18.  Lab results pending.  Preop, famotidine and chlorhexidine instructions provided and questions addressed.  Pt is scheduled on 01/15/18 for Stress and echo with Dr. Lisker will call for results.

## 2018-01-09 NOTE — H&P PST ADULT - PSH
H/O Spinal surgery  L4-L5 5/2006  History of lobectomy of lung  LLL resection  with LND 8/7/17  History of tonsillectomy    S/P scrotal varicocelectomy  two procedures 1985 and 1986

## 2018-01-15 ENCOUNTER — APPOINTMENT (OUTPATIENT)
Dept: CARDIOLOGY | Facility: CLINIC | Age: 68
End: 2018-01-15
Payer: COMMERCIAL

## 2018-01-15 PROCEDURE — 93351 STRESS TTE COMPLETE: CPT

## 2018-01-15 PROCEDURE — 93325 DOPPLER ECHO COLOR FLOW MAPG: CPT

## 2018-01-15 PROCEDURE — 93320 DOPPLER ECHO COMPLETE: CPT

## 2018-01-16 ENCOUNTER — APPOINTMENT (OUTPATIENT)
Dept: THORACIC SURGERY | Facility: CLINIC | Age: 68
End: 2018-01-16
Payer: COMMERCIAL

## 2018-01-16 PROCEDURE — 99215 OFFICE O/P EST HI 40 MIN: CPT

## 2018-01-17 ENCOUNTER — RESULT REVIEW (OUTPATIENT)
Age: 68
End: 2018-01-17

## 2018-01-17 ENCOUNTER — INPATIENT (INPATIENT)
Facility: HOSPITAL | Age: 68
LOS: 1 days | Discharge: HOME | End: 2018-01-19
Attending: THORACIC SURGERY (CARDIOTHORACIC VASCULAR SURGERY) | Admitting: THORACIC SURGERY (CARDIOTHORACIC VASCULAR SURGERY)
Payer: COMMERCIAL

## 2018-01-17 ENCOUNTER — APPOINTMENT (OUTPATIENT)
Dept: THORACIC SURGERY | Facility: HOSPITAL | Age: 68
End: 2018-01-17
Payer: COMMERCIAL

## 2018-01-17 VITALS
SYSTOLIC BLOOD PRESSURE: 124 MMHG | BODY MASS INDEX: 23.27 KG/M2 | DIASTOLIC BLOOD PRESSURE: 74 MMHG | OXYGEN SATURATION: 98 % | RESPIRATION RATE: 16 BRPM | WEIGHT: 150 LBS | HEART RATE: 71 BPM

## 2018-01-17 VITALS
TEMPERATURE: 99 F | OXYGEN SATURATION: 97 % | HEIGHT: 70.5 IN | WEIGHT: 149.03 LBS | DIASTOLIC BLOOD PRESSURE: 67 MMHG | SYSTOLIC BLOOD PRESSURE: 132 MMHG | RESPIRATION RATE: 16 BRPM | HEART RATE: 64 BPM

## 2018-01-17 DIAGNOSIS — Z98.890 OTHER SPECIFIED POSTPROCEDURAL STATES: Chronic | ICD-10-CM

## 2018-01-17 DIAGNOSIS — Z90.2 ACQUIRED ABSENCE OF LUNG [PART OF]: Chronic | ICD-10-CM

## 2018-01-17 DIAGNOSIS — R91.1 SOLITARY PULMONARY NODULE: ICD-10-CM

## 2018-01-17 DIAGNOSIS — Z90.89 ACQUIRED ABSENCE OF OTHER ORGANS: Chronic | ICD-10-CM

## 2018-01-17 PROCEDURE — 99233 SBSQ HOSP IP/OBS HIGH 50: CPT

## 2018-01-17 PROCEDURE — S2900 ROBOTIC SURGICAL SYSTEM: CPT | Mod: NC

## 2018-01-17 PROCEDURE — 32674 THORACOSCOPY LYMPH NODE EXC: CPT

## 2018-01-17 PROCEDURE — 32669 THORACOSCOPY REMOVE SEGMENT: CPT

## 2018-01-17 PROCEDURE — 88331 PATH CONSLTJ SURG 1 BLK 1SPC: CPT | Mod: 26

## 2018-01-17 PROCEDURE — 31622 DX BRONCHOSCOPE/WASH: CPT | Mod: 59

## 2018-01-17 PROCEDURE — 88309 TISSUE EXAM BY PATHOLOGIST: CPT | Mod: 26

## 2018-01-17 PROCEDURE — 88305 TISSUE EXAM BY PATHOLOGIST: CPT | Mod: 26

## 2018-01-17 PROCEDURE — 71045 X-RAY EXAM CHEST 1 VIEW: CPT | Mod: 26

## 2018-01-17 PROCEDURE — 88313 SPECIAL STAINS GROUP 2: CPT | Mod: 26

## 2018-01-17 RX ORDER — DEXAMETHASONE 2 MG/1
2 TABLET ORAL
Qty: 8 | Refills: 0 | Status: COMPLETED | COMMUNITY
Start: 2017-12-05 | End: 2018-01-17

## 2018-01-17 RX ORDER — NALOXONE HYDROCHLORIDE 4 MG/.1ML
0.1 SPRAY NASAL
Qty: 0 | Refills: 0 | Status: DISCONTINUED | OUTPATIENT
Start: 2018-01-17 | End: 2018-01-18

## 2018-01-17 RX ORDER — DOCUSATE SODIUM 100 MG
100 CAPSULE ORAL THREE TIMES A DAY
Qty: 0 | Refills: 0 | Status: DISCONTINUED | OUTPATIENT
Start: 2018-01-17 | End: 2018-01-19

## 2018-01-17 RX ORDER — SILDENAFIL CITRATE 100 MG/1
100 TABLET, FILM COATED ORAL
Qty: 5 | Refills: 0 | Status: COMPLETED | COMMUNITY
Start: 2017-08-10 | End: 2018-01-17

## 2018-01-17 RX ORDER — HEPARIN SODIUM 5000 [USP'U]/ML
5000 INJECTION INTRAVENOUS; SUBCUTANEOUS EVERY 8 HOURS
Qty: 0 | Refills: 0 | Status: DISCONTINUED | OUTPATIENT
Start: 2018-01-17 | End: 2018-01-17

## 2018-01-17 RX ORDER — ACETAMINOPHEN 500 MG
650 TABLET ORAL EVERY 6 HOURS
Qty: 0 | Refills: 0 | Status: DISCONTINUED | OUTPATIENT
Start: 2018-01-17 | End: 2018-01-18

## 2018-01-17 RX ORDER — ONDANSETRON 8 MG/1
4 TABLET, FILM COATED ORAL EVERY 6 HOURS
Qty: 0 | Refills: 0 | Status: DISCONTINUED | OUTPATIENT
Start: 2018-01-17 | End: 2018-01-18

## 2018-01-17 RX ORDER — VALACYCLOVIR 500 MG/1
500 TABLET, FILM COATED ORAL
Qty: 30 | Refills: 0 | Status: COMPLETED | COMMUNITY
Start: 2017-09-26

## 2018-01-17 RX ORDER — SENNA PLUS 8.6 MG/1
2 TABLET ORAL AT BEDTIME
Qty: 0 | Refills: 0 | Status: DISCONTINUED | OUTPATIENT
Start: 2018-01-17 | End: 2018-01-19

## 2018-01-17 RX ORDER — HEPARIN SODIUM 5000 [USP'U]/ML
5000 INJECTION INTRAVENOUS; SUBCUTANEOUS EVERY 12 HOURS
Qty: 0 | Refills: 0 | Status: DISCONTINUED | OUTPATIENT
Start: 2018-01-17 | End: 2018-01-19

## 2018-01-17 RX ORDER — METHYLPREDNISOLONE 4 MG/1
4 TABLET ORAL
Qty: 21 | Refills: 0 | Status: COMPLETED | COMMUNITY
Start: 2017-12-02

## 2018-01-17 RX ORDER — OXYCODONE AND ACETAMINOPHEN 5; 325 MG/1; MG/1
5-325 TABLET ORAL
Qty: 12 | Refills: 0 | Status: COMPLETED | COMMUNITY
Start: 2017-08-20

## 2018-01-17 RX ORDER — SIMVASTATIN 20 MG/1
20 TABLET, FILM COATED ORAL AT BEDTIME
Qty: 0 | Refills: 0 | Status: DISCONTINUED | OUTPATIENT
Start: 2018-01-17 | End: 2018-01-19

## 2018-01-17 RX ORDER — VALACYCLOVIR 500 MG/1
500 TABLET, FILM COATED ORAL DAILY
Qty: 0 | Refills: 0 | Status: DISCONTINUED | OUTPATIENT
Start: 2018-01-17 | End: 2018-01-19

## 2018-01-17 RX ORDER — FAMOTIDINE 10 MG/ML
20 INJECTION INTRAVENOUS EVERY 12 HOURS
Qty: 0 | Refills: 0 | Status: DISCONTINUED | OUTPATIENT
Start: 2018-01-17 | End: 2018-01-19

## 2018-01-17 RX ORDER — CEPHALEXIN 500 MG/1
500 CAPSULE ORAL 3 TIMES DAILY
Qty: 21 | Refills: 0 | Status: COMPLETED | COMMUNITY
Start: 2017-12-01 | End: 2018-01-17

## 2018-01-17 RX ADMIN — Medication 100 MILLIGRAM(S): at 21:32

## 2018-01-17 RX ADMIN — FAMOTIDINE 20 MILLIGRAM(S): 10 INJECTION INTRAVENOUS at 17:33

## 2018-01-17 RX ADMIN — SENNA PLUS 2 TABLET(S): 8.6 TABLET ORAL at 21:32

## 2018-01-17 RX ADMIN — SIMVASTATIN 20 MILLIGRAM(S): 20 TABLET, FILM COATED ORAL at 21:32

## 2018-01-17 RX ADMIN — HEPARIN SODIUM 5000 UNIT(S): 5000 INJECTION INTRAVENOUS; SUBCUTANEOUS at 17:33

## 2018-01-17 RX ADMIN — SODIUM CHLORIDE 30 MILLILITER(S): 9 INJECTION, SOLUTION INTRAVENOUS at 19:22

## 2018-01-17 NOTE — BRIEF OPERATIVE NOTE - PROCEDURE
<<-----Click on this checkbox to enter Procedure Thoracoscopic excision of lung tissue  01/17/2018  Right uniport VATS, RUL posterior anitomical segmentectomy, Lymph node dissection, Intercostal nerve block.  Active  [facility]

## 2018-01-18 ENCOUNTER — TRANSCRIPTION ENCOUNTER (OUTPATIENT)
Age: 68
End: 2018-01-18

## 2018-01-18 LAB
APTT BLD: 29 SEC — SIGNIFICANT CHANGE UP (ref 27.5–37.4)
BUN SERPL-MCNC: 19 MG/DL — SIGNIFICANT CHANGE UP (ref 7–23)
CALCIUM SERPL-MCNC: 8.6 MG/DL — SIGNIFICANT CHANGE UP (ref 8.4–10.5)
CHLORIDE SERPL-SCNC: 99 MMOL/L — SIGNIFICANT CHANGE UP (ref 98–107)
CO2 SERPL-SCNC: 28 MMOL/L — SIGNIFICANT CHANGE UP (ref 22–31)
CREAT SERPL-MCNC: 1 MG/DL — SIGNIFICANT CHANGE UP (ref 0.5–1.3)
GLUCOSE SERPL-MCNC: 126 MG/DL — HIGH (ref 70–99)
HCT VFR BLD CALC: 32.3 % — LOW (ref 39–50)
HGB BLD-MCNC: 10.6 G/DL — LOW (ref 13–17)
INR BLD: 1 — SIGNIFICANT CHANGE UP (ref 0.88–1.17)
MCHC RBC-ENTMCNC: 32.8 % — SIGNIFICANT CHANGE UP (ref 32–36)
MCHC RBC-ENTMCNC: 33.1 PG — SIGNIFICANT CHANGE UP (ref 27–34)
MCV RBC AUTO: 100.9 FL — HIGH (ref 80–100)
NRBC # FLD: 0 — SIGNIFICANT CHANGE UP
PLATELET # BLD AUTO: 166 K/UL — SIGNIFICANT CHANGE UP (ref 150–400)
PMV BLD: 11.6 FL — SIGNIFICANT CHANGE UP (ref 7–13)
POTASSIUM SERPL-MCNC: 4.5 MMOL/L — SIGNIFICANT CHANGE UP (ref 3.5–5.3)
POTASSIUM SERPL-SCNC: 4.5 MMOL/L — SIGNIFICANT CHANGE UP (ref 3.5–5.3)
PROTHROM AB SERPL-ACNC: 11.5 SEC — SIGNIFICANT CHANGE UP (ref 9.8–13.1)
RBC # BLD: 3.2 M/UL — LOW (ref 4.2–5.8)
RBC # FLD: 13.9 % — SIGNIFICANT CHANGE UP (ref 10.3–14.5)
SODIUM SERPL-SCNC: 137 MMOL/L — SIGNIFICANT CHANGE UP (ref 135–145)
WBC # BLD: 12.7 K/UL — HIGH (ref 3.8–10.5)
WBC # FLD AUTO: 12.7 K/UL — HIGH (ref 3.8–10.5)

## 2018-01-18 PROCEDURE — 71045 X-RAY EXAM CHEST 1 VIEW: CPT | Mod: 26

## 2018-01-18 PROCEDURE — 99233 SBSQ HOSP IP/OBS HIGH 50: CPT

## 2018-01-18 RX ORDER — ACETAMINOPHEN 500 MG
650 TABLET ORAL EVERY 6 HOURS
Qty: 0 | Refills: 0 | Status: DISCONTINUED | OUTPATIENT
Start: 2018-01-18 | End: 2018-01-19

## 2018-01-18 RX ORDER — OXYCODONE HYDROCHLORIDE 5 MG/1
10 TABLET ORAL
Qty: 0 | Refills: 0 | Status: DISCONTINUED | OUTPATIENT
Start: 2018-01-18 | End: 2018-01-19

## 2018-01-18 RX ORDER — TAMSULOSIN HYDROCHLORIDE 0.4 MG/1
0.4 CAPSULE ORAL AT BEDTIME
Qty: 0 | Refills: 0 | Status: DISCONTINUED | OUTPATIENT
Start: 2018-01-18 | End: 2018-01-19

## 2018-01-18 RX ORDER — OXYCODONE HYDROCHLORIDE 5 MG/1
5 TABLET ORAL
Qty: 0 | Refills: 0 | Status: DISCONTINUED | OUTPATIENT
Start: 2018-01-18 | End: 2018-01-19

## 2018-01-18 RX ORDER — HYDROMORPHONE HYDROCHLORIDE 2 MG/ML
0.5 INJECTION INTRAMUSCULAR; INTRAVENOUS; SUBCUTANEOUS
Qty: 0 | Refills: 0 | Status: DISCONTINUED | OUTPATIENT
Start: 2018-01-18 | End: 2018-01-18

## 2018-01-18 RX ADMIN — HEPARIN SODIUM 5000 UNIT(S): 5000 INJECTION INTRAVENOUS; SUBCUTANEOUS at 06:09

## 2018-01-18 RX ADMIN — Medication 650 MILLIGRAM(S): at 23:07

## 2018-01-18 RX ADMIN — FAMOTIDINE 20 MILLIGRAM(S): 10 INJECTION INTRAVENOUS at 17:53

## 2018-01-18 RX ADMIN — Medication 100 MILLIGRAM(S): at 13:38

## 2018-01-18 RX ADMIN — TAMSULOSIN HYDROCHLORIDE 0.4 MILLIGRAM(S): 0.4 CAPSULE ORAL at 22:15

## 2018-01-18 RX ADMIN — HEPARIN SODIUM 5000 UNIT(S): 5000 INJECTION INTRAVENOUS; SUBCUTANEOUS at 17:53

## 2018-01-18 RX ADMIN — Medication 650 MILLIGRAM(S): at 17:53

## 2018-01-18 RX ADMIN — FAMOTIDINE 20 MILLIGRAM(S): 10 INJECTION INTRAVENOUS at 06:09

## 2018-01-18 RX ADMIN — Medication 650 MILLIGRAM(S): at 23:45

## 2018-01-18 RX ADMIN — SIMVASTATIN 20 MILLIGRAM(S): 20 TABLET, FILM COATED ORAL at 22:15

## 2018-01-18 RX ADMIN — Medication 100 MILLIGRAM(S): at 06:09

## 2018-01-18 RX ADMIN — VALACYCLOVIR 500 MILLIGRAM(S): 500 TABLET, FILM COATED ORAL at 11:54

## 2018-01-18 NOTE — PHYSICAL THERAPY INITIAL EVALUATION ADULT - PATIENT PROFILE REVIEW, REHAB EVAL
Pt. profile reviewed, consulted with RN Meredith PRADO prior to initial PT evaluation and tx, as per RN, Pt. is OK to participate in skilled therapy session, current activity orders; ambulate as tolerated./yes

## 2018-01-18 NOTE — PHYSICAL THERAPY INITIAL EVALUATION ADULT - GAIT DISTANCE, PT EVAL
20ft. Further ambulation distance not assessed secondary to pt. wanting to rest secondary to walking unit earlier this morning.

## 2018-01-18 NOTE — PHYSICAL THERAPY INITIAL EVALUATION ADULT - CRITERIA FOR SKILLED THERAPEUTIC INTERVENTIONS
D/c to home with no PT services upon D/C/impairments found/anticipated discharge recommendation/rehab potential/functional limitations in following categories

## 2018-01-18 NOTE — PROGRESS NOTE ADULT - SUBJECTIVE AND OBJECTIVE BOX
Anesthesia Pain Management Service: Day __ of Epidural    SUBJECTIVE: Patient doing well with PCEA and no problems.  Pain Scale Score:   Refer to charted pain scores    THERAPY:  [x ] Epidural Bupivacaine 0.0625% and Hydromorphone  		[ X] 10 micrograms/mL	[ ] 5 micrograms/mL  [ ] Epidural Bupivacaine 0.0625% and Fentanyl - 2 micrograms/mL  [ ] Epidural Ropivacaine 0.1% plain – 1 mg/mL  [ ] Patient Controlled Regional Anesthesia (PCRA) Ropivacaine  		[ ] 0.2%			[ ] 0.1%    Demand dose __3_ lockout __15_ (minutes) Continuous Rate _2__ Total: __47__ ml used (in past 24 hours)      MEDICATIONS  (STANDING):  docusate sodium 100 milliGRAM(s) Oral three times a day  famotidine    Tablet 20 milliGRAM(s) Oral every 12 hours  heparin  Injectable 5000 Unit(s) SubCutaneous every 12 hours  HYDROmorphone (10 MICROgram(s)/mL) + BUpivacaine 0.0625% in 0.9% Sodium Chloride PCEA 250 milliLiter(s) Epidural PCA Continuous  senna 2 Tablet(s) Oral at bedtime  simvastatin 20 milliGRAM(s) Oral at bedtime  valACYclovir 500 milliGRAM(s) Oral daily    MEDICATIONS  (PRN):  acetaminophen   Tablet 650 milliGRAM(s) Oral every 6 hours PRN Mild Pain (1 - 3)  HYDROmorphone  Injectable 0.5 milliGRAM(s) IV Push every 3 hours PRN Severe Breakthrough Pain while on Epidural infusiion  HYDROmorphone (10 MICROgram(s)/mL) + BUpivacaine 0.0625% in 0.9% Sodium Chloride PCEA Rescue Clinician  Bolus 5 milliLiter(s) Epidural every 15 minutes PRN for Pain Score greater than 6  naloxone Injectable 0.1 milliGRAM(s) IV Push every 3 minutes PRN For ANY of the following changes in patient status:  A. RR LESS THAN 10 breaths per minute, B. Oxygen saturation LESS THAN 90%, C. Sedation score of 6  ondansetron Injectable 4 milliGRAM(s) IV Push every 6 hours PRN Nausea      OBJECTIVE:    Assessment of Catheter Site:	[ ] Left	[ ] Right  [x ] Epidural 	[ ] Femoral	      [ ] Saphenous   [ ] Supraclavicular   [ ] Other:    [x ] Dressing intact & reinforced  	[x ] Site non-tender	[ x] Site without erythema, discharge, edema  [x ] Epidural tubing and connection checked	[x] Gross neurological exam within normal limits  [ ] Catheter removed – tip intact		[ ] Afebrile  	[ ] Febrile: ___   [ X] see Temp under VS below)    PT/INR - ( 18 Jan 2018 04:55 )   PT: 11.5 SEC;   INR: 1.00          PTT - ( 18 Jan 2018 04:55 )  PTT:29.0 SEC                      10.6   12.70 )-----------( 166      ( 18 Jan 2018 04:45 )             32.3     Vital Signs Last 24 Hrs  T(C): 37 (01-18-18 @ 10:27), Max: 37.2 (01-18-18 @ 00:00)  T(F): 98.6 (01-18-18 @ 10:27), Max: 98.9 (01-18-18 @ 00:00)  HR: 62 (01-18-18 @ 10:27) (48 - 81)  BP: 109/54 (01-18-18 @ 10:27) (97/48 - 128/60)  BP(mean): 64 (01-18-18 @ 09:00) (58 - 91)  RR: 16 (01-18-18 @ 10:27) (12 - 25)  SpO2: 99% (01-18-18 @ 10:27) (92% - 100%)      Sedation Score:	[x ] Alert	[ ] Drowsy	[ ] Arousable	[ ] Asleep	[ ] Unresponsive    Side Effects:	[x ] None	[ ] Nausea	[ ] Vomiting	[ ] Pruritus  		[ ] Weakness		[ ] Numbness	[ ] Other:    ASSESSMENT/ PLAN:    Therapy to  be:	[x ] Continue   [ ] Discontinued   [ ] Change to prn Analgesics    Documentation and Verification of current medications:  [ X ] Done	[ ] Not done, not eligible, reason:    Comments: Doing OK with epidural and may continue. Discussed with CT ICU attending.    Progress Note written now but Patient was seen earlier.
BERTA SUN            [id number]         No Known Allergies             67 year old male presents to presurgical testing with diagnosis of solitary pulmonary nodule scheduled for flexible bronchoscopy, Right video assisted thoracoscopy, possible Thoracotomy, right Upper Lung Resection on 01/17/18.   Pt was dx with lung cancer August 2017 s/p Left lower  lung resection for 8/7/17 and 4 round chemotherapy last being December 11, 2017.. Pt reports cough with green sputum onset in 1/2017, treated with antibiotics, with persistent symptoms. CXR revealing COPD, referred for CT chest revealing pulmonary nodules. Pt denies SOB with exertion. PFT done. (09 Jan 2018 14:06)      Procedure: Right uniport VATS, RUL posterior anatomical segmentectomy, Lymph node dissection, Intercostal nerve block  POD# 1    Issues:   Lung nodule  Postop pain  COPD  HLD      ICU Vital Signs Last 24 Hrs  T(C): 36.9 (18 Jan 2018 04:00), Max: 37.2 (18 Jan 2018 00:00)  T(F): 98.4 (18 Jan 2018 04:00), Max: 98.9 (18 Jan 2018 00:00)  HR: 66 (18 Jan 2018 06:00) (48 - 81)  BP: 97/48 (18 Jan 2018 06:00) (97/48 - 132/67)  BP(mean): 59 (18 Jan 2018 06:00) (58 - 91)  ABP: 90/44 (18 Jan 2018 06:00) (90/40 - 137/55)  ABP(mean): 59 (18 Jan 2018 06:00) (56 - 80)  RR: 15 (18 Jan 2018 06:00) (12 - 25)  SpO2: 92% (18 Jan 2018 06:00) (92% - 100%)      I&O's Summary    17 Jan 2018 07:01  -  18 Jan 2018 06:34  --------------------------------------------------------  IN: 600 mL / OUT: 2415 mL / NET: -1815 mL      CAPILLARY BLOOD GLUCOSE          MEDICATIONS  (STANDING):  docusate sodium 100 milliGRAM(s) Oral three times a day  famotidine    Tablet 20 milliGRAM(s) Oral every 12 hours  heparin  Injectable 5000 Unit(s) SubCutaneous every 12 hours  HYDROmorphone (10 MICROgram(s)/mL) + BUpivacaine 0.0625% in 0.9% Sodium Chloride PCEA 250 milliLiter(s) Epidural PCA Continuous  lactated ringers. 1000 milliLiter(s) (30 mL/Hr) IV Continuous <Continuous>  senna 2 Tablet(s) Oral at bedtime  simvastatin 20 milliGRAM(s) Oral at bedtime  valACYclovir 500 milliGRAM(s) Oral daily    MEDICATIONS  (PRN):  acetaminophen   Tablet 650 milliGRAM(s) Oral every 6 hours PRN Mild Pain (1 - 3)  HYDROmorphone (10 MICROgram(s)/mL) + BUpivacaine 0.0625% in 0.9% Sodium Chloride PCEA Rescue Clinician  Bolus 5 milliLiter(s) Epidural every 15 minutes PRN for Pain Score greater than 6  naloxone Injectable 0.1 milliGRAM(s) IV Push every 3 minutes PRN For ANY of the following changes in patient status:  A. RR LESS THAN 10 breaths per minute, B. Oxygen saturation LESS THAN 90%, C. Sedation score of 6  ondansetron Injectable 4 milliGRAM(s) IV Push every 6 hours PRN Nausea      Physical exam:                                                   General:               Pt is awake, alert, not in any distress                                                  Neuro:                  Nonfocal                             Cardiovascular:   S1 & S2, regular                           Respiratory:         Air entry is fair and equal on both sides, has bilateral conducted sounds                           GI:                          Soft, nondistended and nontender, Bowel sounds active                            Ext:                        No cyanosis or edema      Labs:                                                10.6   12.70 )-----------( 166      ( 18 Jan 2018 04:45 )             32.3                           01-18    137  |  99  |  19  ----------------------------<  126<H>  4.5   |  28  |  1.00    Ca    8.6      18 Jan 2018 04:45      PT/INR - ( 18 Jan 2018 04:55 )   PT: 11.5 SEC;   INR: 1.00          PTT - ( 18 Jan 2018 04:55 )  PTT:29.0 SEC                                                                                         Plan:    General: 67yMale s/p Right uniport VATS, RUL posterior anatomical segmentectomy, Lymph node dissection, Intercostal nerve block  POD#1  progressing well, experiencing  pain with deep breathing.                             Neuro:                                         Pain control with PCA / Tylenol IV                            Cardiovascular:                                          Continue hemodynamic monitoring.    HLD: On Zocar                            Respiratory:                                         Pt is on RA                                           Comfortable, not in any distress.                                         Using incentive spirometry                                          Monitor chest tube output                                         Chest tube to water seal - No air leak. D/C if OK with Dr. Trent                                                                  COPD: Continue bronchodilators, pulmonary toilet                            GI                                         On DASH diet                                          Continue GI prophylaxis with Pepcid                                          Continue Zofran / Reglan for nausea - PRN	                                                                 Renal:                                         Continue LR 30cc/hr                                         Monitor I/Os and electrolytes                                                                                        Hem/ Onc:                                                                                  Monitor chest tube output &  signs of bleeding.                                                                   Infectious disease:                                            No signs of infection. Monitor for fever / leukocytosis.                                          All surgical incision / chest tube  sites look clean                            Endocrine                                             Continue Accu-Checks with coverage    Pt is on SQ Heparin and Venodyne boots for DVT prophylaxis.     Pertinent clinical, laboratory, radiographic, hemodynamic, echocardiographic, respiratory data, microbiologic data and chart were reviewed and analyzed frequently throughout the course of the day and night  Patient seen, examined and plan discussed with CT Surgeon / CTICU team during rounds.    Pt's status discussed with family at bedside, updated status          Alfonso Couch MD
BERTA SUN            MRN-[id number]         No Known Allergies               HPI:  67 year old male presents to presurgical testing with diagnosis of solitary pulmonary nodule scheduled for flexible bronchoscopy, Right video assisted thoracoscopy, possible Thoracotomy, right Upper Lung Resection on 01/17/18.   Pt was dx with lung cancer August 2017 s/p Left lower  lung resection for 8/7/17 and 4 round chemotherapy last being December 11, 2017.. Pt reports cough with green sputum onset in 1/2017, treated with antibiotics, with persistent symptoms. CXR revealing COPD, referred for CT chest revealing pulmonary nodules. Pt denies SOB with exertion. PFT done. (09 Jan 2018 14:06)      Procedure: Right uniport VATS, RUL posterior anatomical segmentectomy, Lymph node dissection, Intercostal nerve block  POD# 0    Issues:   Lung nodule  Postop pain  COPD  HLD                 Home Medications:  acetaminophen 325 mg oral tablet: 2 tab(s) orally every 6 hours, As needed, Mild Pain (1 - 3) (17 Jan 2018 08:01)  Allegra 180 mg oral tablet: 1 tab(s) orally once a day, am (17 Jan 2018 08:01)  Calcium 600+D3 800 units: 1   once a day, am (17 Jan 2018 08:01)  multivitamin with minerals: 1 tab(s)  once a day, am. Last dose 1/9/18 (17 Jan 2018 08:01)  simvastatin 20 mg oral tablet: 1 tab(s) orally once a day (at bedtime) (17 Jan 2018 08:01)  valACYclovir 500 mg oral tablet: 1 tab(s) orally once a day in am (17 Jan 2018 08:01)      PAST MEDICAL & SURGICAL HISTORY:  Erectile dysfunction of organic origin  Benign prostatic hyperplasia with urinary obstruction and other lower urinary tract symptoms  Adenocarcinoma of lung  Thyroid nodule: found incidentally on CT scan  Solitary pulmonary nodule: right and left lung  COPD (chronic obstructive pulmonary disease)  Lumbar disc herniation: s/p lumbar surgery L4-L5  Neutropenia: longstanding, stable, no intervention  Genital herpes  HLD (hyperlipidemia)  History of lobectomy of lung: LLL resection  with LND 8/7/17  S/P scrotal varicocelectomy: two procedures 1985 and 1986  History of tonsillectomy  H/O Spinal surgery: L4-L5 5/2006        ICU Vital Signs Last 24 Hrs  T(C): 35.7 (17 Jan 2018 12:15), Max: 37 (17 Jan 2018 07:46)  T(F): 96.2 (17 Jan 2018 12:15), Max: 98.6 (17 Jan 2018 07:46)  HR: 49 (17 Jan 2018 13:30) (48 - 64)  BP: 116/59 (17 Jan 2018 13:00) (114/63 - 132/67)  BP(mean): 72 (17 Jan 2018 13:00) (72 - 91)  ABP: --  ABP(mean): --  RR: 14 (17 Jan 2018 13:30) (14 - 20)  SpO2: 96% (17 Jan 2018 13:30) (95% - 99%)    I&O's Detail    17 Jan 2018 07:01  -  17 Jan 2018 14:32  --------------------------------------------------------  IN:    lactated ringers.: 60 mL  Total IN: 60 mL    OUT:    Indwelling Catheter - Urethral: 350 mL  Total OUT: 350 mL    Total NET: -290 mL        CAPILLARY BLOOD GLUCOSE      Home Medications:  acetaminophen 325 mg oral tablet: 2 tab(s) orally every 6 hours, As needed, Mild Pain (1 - 3) (17 Jan 2018 08:01)  Allegra 180 mg oral tablet: 1 tab(s) orally once a day, am (17 Jan 2018 08:01)  Calcium 600+D3 800 units: 1   once a day, am (17 Jan 2018 08:01)  multivitamin with minerals: 1 tab(s)  once a day, am. Last dose 1/9/18 (17 Jan 2018 08:01)  simvastatin 20 mg oral tablet: 1 tab(s) orally once a day (at bedtime) (17 Jan 2018 08:01)  valACYclovir 500 mg oral tablet: 1 tab(s) orally once a day in am (17 Jan 2018 08:01)      MEDICATIONS  (STANDING):  docusate sodium 100 milliGRAM(s) Oral three times a day  famotidine    Tablet 20 milliGRAM(s) Oral every 12 hours  heparin  Injectable 5000 Unit(s) SubCutaneous every 12 hours  HYDROmorphone (10 MICROgram(s)/mL) + BUpivacaine 0.0625% in 0.9% Sodium Chloride PCEA 250 milliLiter(s) Epidural PCA Continuous  lactated ringers. 1000 milliLiter(s) (30 mL/Hr) IV Continuous <Continuous>  senna 2 Tablet(s) Oral at bedtime  simvastatin 20 milliGRAM(s) Oral at bedtime  valACYclovir 500 milliGRAM(s) Oral daily    MEDICATIONS  (PRN):  acetaminophen   Tablet 650 milliGRAM(s) Oral every 6 hours PRN Mild Pain (1 - 3)  HYDROmorphone (10 MICROgram(s)/mL) + BUpivacaine 0.0625% in 0.9% Sodium Chloride PCEA Rescue Clinician  Bolus 5 milliLiter(s) Epidural every 15 minutes PRN for Pain Score greater than 6  naloxone Injectable 0.1 milliGRAM(s) IV Push every 3 minutes PRN For ANY of the following changes in patient status:  A. RR LESS THAN 10 breaths per minute, B. Oxygen saturation LESS THAN 90%, C. Sedation score of 6  ondansetron Injectable 4 milliGRAM(s) IV Push every 6 hours PRN Nausea          Physical exam:                             General:               Pt is awake, alert, not in any distress                                                  Neuro:                  Nonfocal                             Cardiovascular:   S1 & S2, regular                           Respiratory:         Air entry is fair and equal on both sides, has bilateral conducted sounds                           GI:                          Soft, nondistended and nontender, Bowel sounds active                            Ext:                        No cyanosis or edema     Labs:                                                               CXR:    Right chest tube in place. Lungs are clear. Postop changes    Plan:    General: 67yMale s/p Right uniport VATS, RUL posterior anatomical segmentectomy, Lymph node dissection, Intercostal nerve block  POD#0  progressing well, experiencing  pain with deep breathing.                             Neuro:                                         Pain control with PCA / Tylenol IV                            Cardiovascular:                                          Continue hemodynamic monitoring.    HLD: On Zocar                            Respiratory:                                         Pt is on 2L  nasal canula,                                           Comfortable, not in any distress.                                         Using incentive spirometry                                          Monitor chest tube output                                         Chest tube to water seal                                                                  COPD: Continue bronchodilators, pulmonary toilet                            GI                                         On clear liquids, advance to regular diet as tolerated                                         Continue GI prophylaxis with Pepcid                                          Continue Zofran / Reglan for nausea - PRN	                                                                 Renal:                                         Continue LR 30cc/hr                                         Monitor I/Os and electrolytes                                         D/C Camacho in AM                                                 Hem/ Onc:                                                                                  Monitor chest tube output &  signs of bleeding.                                          Follow CBC in AM                           Infectious disease:                                            No signs of infection. Monitor for fever / leukocytosis.                                          All surgical incision / chest tube  sites look clean                            Endocrine                                             Continue Accu-Checks with coverage    Pt is on SQ Heparin and Venodyne boots for DVT prophylaxis.     Pertinent clinical, laboratory, radiographic, hemodynamic, echocardiographic, respiratory data, microbiologic data and chart were reviewed and analyzed frequently throughout the course of the day and night  Patient seen, examined and plan discussed with CT Surgeon / CTICU team during rounds.    Pt's status discussed with family at bedside, updated status             Alfonso Couch MD
POST ANESTHESIA EVALUATION    67y Male POSTOP DAY 1 S/P     MENTAL STATUS: Patient participation [ X ] Awake     [  ] Arousable     [  ] Sedated    AIRWAY PATENCY: [X  ] Satisfactory  [  ] Other:     Vital Signs Last 24 Hrs  T(C): 37 (18 Jan 2018 10:27), Max: 37.2 (18 Jan 2018 00:00)  T(F): 98.6 (18 Jan 2018 10:27), Max: 98.9 (18 Jan 2018 00:00)  HR: 62 (18 Jan 2018 10:27) (48 - 81)  BP: 109/54 (18 Jan 2018 10:27) (97/48 - 128/60)  BP(mean): 64 (18 Jan 2018 09:00) (58 - 91)  RR: 16 (18 Jan 2018 10:27) (12 - 25)  SpO2: 99% (18 Jan 2018 10:27) (92% - 100%)  I&O's Summary    17 Jan 2018 07:01  -  18 Jan 2018 07:00  --------------------------------------------------------  IN: 600 mL / OUT: 2525 mL / NET: -1925 mL    18 Jan 2018 07:01  -  18 Jan 2018 11:18  --------------------------------------------------------  IN: 300 mL / OUT: 140 mL / NET: 160 mL          NAUSEA/ VOMITTING:  [ X ] NONE  [  ] CONTROLLED [  ] OTHER     PAIN: [ X ] CONTROLLED WITH CURRENT REGIMEN  [  ] OTHER    [ X ] NO APPARENT ANESTHESIA COMPLICATIONS      Comments:

## 2018-01-18 NOTE — PHYSICAL THERAPY INITIAL EVALUATION ADULT - PERTINENT HX OF CURRENT PROBLEM, REHAB EVAL
Pt is a 67 year old male admitted to [facility] secondary to thoracoscopic excision of lung tissue. PMH: lung cancer

## 2018-01-18 NOTE — PHYSICAL THERAPY INITIAL EVALUATION ADULT - MANUAL MUSCLE TESTING RESULTS, REHAB EVAL
Bilateral UE muscle strength grossly 3/5 Throughout; Bilateral LE muscle strength grossly 3/5 Throughout.

## 2018-01-18 NOTE — PHYSICAL THERAPY INITIAL EVALUATION ADULT - ADDITIONAL COMMENTS
Pt. lives in a pvt home with their spouse. Pt. has 7 steps with HR x 1 to reach bedroom/bathroom. Pt. was previously ambulating household and community distances without the use of an AD independently. Pt. was previously independent with ADLs. Pt. returned to chair at end of therapy session with all lines and tubes intact, call bell in reach and in NAD. GUTIERREZ PRADO made aware

## 2018-01-18 NOTE — PHYSICAL THERAPY INITIAL EVALUATION ADULT - PLANNED THERAPY INTERVENTIONS, PT EVAL
stair negotiation/transfer training/strengthening/bed mobility training/balance training/gait training

## 2018-01-19 ENCOUNTER — TRANSCRIPTION ENCOUNTER (OUTPATIENT)
Age: 68
End: 2018-01-19

## 2018-01-19 VITALS
DIASTOLIC BLOOD PRESSURE: 60 MMHG | SYSTOLIC BLOOD PRESSURE: 119 MMHG | OXYGEN SATURATION: 99 % | TEMPERATURE: 98 F | HEART RATE: 66 BPM | RESPIRATION RATE: 18 BRPM

## 2018-01-19 LAB
APTT BLD: 33.6 SEC — SIGNIFICANT CHANGE UP (ref 27.5–37.4)
BUN SERPL-MCNC: 17 MG/DL — SIGNIFICANT CHANGE UP (ref 7–23)
CA-I BLD-SCNC: 1.16 MMOL/L — SIGNIFICANT CHANGE UP (ref 1.03–1.23)
CALCIUM SERPL-MCNC: 9.1 MG/DL — SIGNIFICANT CHANGE UP (ref 8.4–10.5)
CHLORIDE SERPL-SCNC: 100 MMOL/L — SIGNIFICANT CHANGE UP (ref 98–107)
CO2 SERPL-SCNC: 29 MMOL/L — SIGNIFICANT CHANGE UP (ref 22–31)
CREAT SERPL-MCNC: 0.94 MG/DL — SIGNIFICANT CHANGE UP (ref 0.5–1.3)
GLUCOSE SERPL-MCNC: 94 MG/DL — SIGNIFICANT CHANGE UP (ref 70–99)
HCT VFR BLD CALC: 33 % — LOW (ref 39–50)
HGB BLD-MCNC: 10.9 G/DL — LOW (ref 13–17)
INR BLD: 1.04 — SIGNIFICANT CHANGE UP (ref 0.88–1.17)
MAGNESIUM SERPL-MCNC: 1.7 MG/DL — SIGNIFICANT CHANGE UP (ref 1.6–2.6)
MCHC RBC-ENTMCNC: 33 % — SIGNIFICANT CHANGE UP (ref 32–36)
MCHC RBC-ENTMCNC: 33.4 PG — SIGNIFICANT CHANGE UP (ref 27–34)
MCV RBC AUTO: 101.2 FL — HIGH (ref 80–100)
NRBC # FLD: 0 — SIGNIFICANT CHANGE UP
PHOSPHATE SERPL-MCNC: 2.7 MG/DL — SIGNIFICANT CHANGE UP (ref 2.5–4.5)
PLATELET # BLD AUTO: 148 K/UL — LOW (ref 150–400)
PMV BLD: 11.9 FL — SIGNIFICANT CHANGE UP (ref 7–13)
POTASSIUM SERPL-MCNC: 4.1 MMOL/L — SIGNIFICANT CHANGE UP (ref 3.5–5.3)
POTASSIUM SERPL-SCNC: 4.1 MMOL/L — SIGNIFICANT CHANGE UP (ref 3.5–5.3)
PROTHROM AB SERPL-ACNC: 11.6 SEC — SIGNIFICANT CHANGE UP (ref 9.8–13.1)
RBC # BLD: 3.26 M/UL — LOW (ref 4.2–5.8)
RBC # FLD: 14 % — SIGNIFICANT CHANGE UP (ref 10.3–14.5)
SODIUM SERPL-SCNC: 141 MMOL/L — SIGNIFICANT CHANGE UP (ref 135–145)
WBC # BLD: 7.46 K/UL — SIGNIFICANT CHANGE UP (ref 3.8–10.5)
WBC # FLD AUTO: 7.46 K/UL — SIGNIFICANT CHANGE UP (ref 3.8–10.5)

## 2018-01-19 PROCEDURE — 71045 X-RAY EXAM CHEST 1 VIEW: CPT | Mod: 26

## 2018-01-19 PROCEDURE — 99238 HOSP IP/OBS DSCHRG MGMT 30/<: CPT

## 2018-01-19 RX ORDER — SENNA PLUS 8.6 MG/1
2 TABLET ORAL
Qty: 0 | Refills: 0 | DISCHARGE
Start: 2018-01-19

## 2018-01-19 RX ORDER — DOCUSATE SODIUM 100 MG
1 CAPSULE ORAL
Qty: 0 | Refills: 0 | DISCHARGE
Start: 2018-01-19

## 2018-01-19 RX ORDER — OXYCODONE HYDROCHLORIDE 5 MG/1
2 TABLET ORAL
Qty: 60 | Refills: 0
Start: 2018-01-19 | End: 2018-01-23

## 2018-01-19 RX ORDER — MULTIVIT-MIN/FERROUS GLUCONATE 9 MG/15 ML
1 LIQUID (ML) ORAL
Qty: 0 | Refills: 0 | COMMUNITY

## 2018-01-19 RX ADMIN — VALACYCLOVIR 500 MILLIGRAM(S): 500 TABLET, FILM COATED ORAL at 12:29

## 2018-01-19 RX ADMIN — OXYCODONE HYDROCHLORIDE 10 MILLIGRAM(S): 5 TABLET ORAL at 10:20

## 2018-01-19 RX ADMIN — Medication 650 MILLIGRAM(S): at 06:01

## 2018-01-19 RX ADMIN — HEPARIN SODIUM 5000 UNIT(S): 5000 INJECTION INTRAVENOUS; SUBCUTANEOUS at 05:34

## 2018-01-19 RX ADMIN — Medication 650 MILLIGRAM(S): at 05:34

## 2018-01-19 RX ADMIN — OXYCODONE HYDROCHLORIDE 10 MILLIGRAM(S): 5 TABLET ORAL at 10:45

## 2018-01-19 RX ADMIN — FAMOTIDINE 20 MILLIGRAM(S): 10 INJECTION INTRAVENOUS at 05:34

## 2018-01-19 NOTE — DISCHARGE NOTE ADULT - ADDITIONAL INSTRUCTIONS
See Dr Trent in 2 weeks and your PCP.  Call for appointments. See Dr Trnet in 2 weeks and your PCP.  Call for appointments.   Please follow up with Dr. Fernandez.

## 2018-01-19 NOTE — DISCHARGE NOTE ADULT - MEDICATION SUMMARY - MEDICATIONS TO TAKE
I will START or STAY ON the medications listed below when I get home from the hospital:    Calcium 600+D3 800 units  -- 1   once a day, am  -- Indication: For Supplement    oxyCODONE 5 mg oral tablet  -- 2 tab(s) by mouth every 4 hours, As Needed for severe pain. Can take 1 tab for moderate pain. MDD:12  -- Indication: For pain    acetaminophen 325 mg oral tablet  -- 2 tab(s) by mouth every 6 hours, As needed, Mild Pain (1 - 3)  -- Indication: For pain    Allegra 180 mg oral tablet  -- 1 tab(s) by mouth once a day, am  -- Indication: For allergies    simvastatin 20 mg oral tablet  -- 1 tab(s) by mouth once a day (at bedtime)  -- Indication: For cholesterol    valACYclovir 500 mg oral tablet  -- 1 tab(s) by mouth once a day in am  -- Indication: For anti viral    senna oral tablet  -- 2 tab(s) by mouth once a day (at bedtime)  -- Indication: For constipaiton    docusate sodium 100 mg oral capsule  -- 1 cap(s) by mouth 3 times a day  -- Indication: For constipation    multivitamin with minerals  -- 1 tab(s)  once a day, am.   -- Indication: For vitamin

## 2018-01-19 NOTE — DISCHARGE NOTE ADULT - CARE PLAN
Principal Discharge DX:	Solitary pulmonary nodule  Goal:	wound healing; treatment plan development when final pathology returns  Assessment and plan of treatment:	final pathology is pending Principal Discharge DX:	Solitary pulmonary nodule  Goal:	wound healing; treatment plan development when final pathology returns  Assessment and plan of treatment:	You may remove the dressing in 48 hours, take a shower allowing warm water to run over incision pat dry and leave to air. Continue with daily ambulation and use of incentive spirometer. Please follow up with Dr. Trent in 2 weeks. Please follow up with Dr. Fernandez. Please call the office at  if you experience an increase in shortness of breath, fever, purulent discharge from site of incision and pain not relieved by medication or rest. Principal Discharge DX:	Solitary pulmonary nodule  Goal:	wound healing; treatment plan development when final pathology returns  Assessment and plan of treatment:	You may remove the dressing in 48 hours, take a shower allowing warm water to run over incision pat dry and leave to air. Continue with daily ambulation and use of incentive spirometer.   Please follow up with Dr. Trent in 2 weeks. Please call the office at  if you experience an increase in shortness of breath, fever, purulent discharge from site of incision and pain not relieved by medication or rest. And to make your apt.  Have a chest xray done prior to that apt and then bring those images with you.

## 2018-01-19 NOTE — DISCHARGE NOTE ADULT - NS AS ACTIVITY OBS
Walking-Indoors allowed/Stairs allowed/Sex allowed/Showering allowed/Driving allowed/Do not drive or operate machinery/No Heavy lifting/straining/Shower after chest tube dressings are removed two days after the chest tube was removed./Walking-Outdoors allowed Showering allowed/Driving allowed/Walking-Indoors allowed/Walking-Outdoors allowed/Shower after chest tube dressings are removed two days after the chest tube was removed./Stairs allowed/No Heavy lifting/straining/Do not make important decisions/Sex allowed/Do not drive or operate machinery

## 2018-01-19 NOTE — DISCHARGE NOTE ADULT - CARE PROVIDER_API CALL
Alberto Trent), Surgery; Thoracic Surgery  [id number] [place] [id number]  Phone: [phone]  Fax: [phone]    Rio Fernandez), Internal Medicine  [address] [place] [id number]  Phone: [phone]  Fax: [phone]

## 2018-01-19 NOTE — DISCHARGE NOTE ADULT - HOSPITAL COURSE
67 year old male presented with a diagnosed solitary pulmonary nodule.  He had already been dx'd with lung cancer August 2017 s/p a Left lower lung resection on 8/7/17 and subsequently underwent 4 rounds of chemotherapy, the last on December 11, 2017.  For this admission, he underwent a flexible bronchoscopy, Right video assisted thoracoscopy, right upper lobe posterior segmentectomy on 01/17/18.    Post-op, he had an intermittent air leak.  he was discharged after chest tube removal. 67 year old male presented with a diagnosed solitary pulmonary nodule.  He had already been dx'd with lung cancer August 2017 s/p a Left lower lung resection on 8/7/17 and subsequently underwent 4 rounds of chemotherapy, the last on December 11, 2017.  For this admission, he underwent a flexible bronchoscopy, Right video assisted thoracoscopy, right upper lobe posterior segmentectomy on 01/17/18.  Post-op, he had an intermittent air leak, which resolved. Patient was ambulating, in no acute distress and the chest tube was removed via protocol. CXR post tube removal showed no pneumothorax. He was discharged after chest tube removal. 67 year old male presented with a diagnosed solitary pulmonary nodule.  He had already been dx'd with lung cancer August 2017 s/p a Left lower lung resection on 8/7/17 and subsequently underwent 4 rounds of chemotherapy, the last on December 11, 2017.  For this admission, he underwent a flexible bronchoscopy, Right video assisted thoracoscopy, right upper lobe posterior segmentectomy on 01/17/18.  Post-op, he had an intermittent air leak, which resolved. Patient was ambulating, in no acute distress and the chest tube was removed via protocol. CXR post tube removal showed no pneumothorax. He was discharged after chest tube removal.  Cleared for discharge by Dr. Trent.

## 2018-01-19 NOTE — DISCHARGE NOTE ADULT - PATIENT PORTAL LINK FT
“You can access the [facility] Patient Portal, offered by [facility], by registering with the following website: [email or web address]”

## 2018-01-19 NOTE — DISCHARGE NOTE ADULT - INSTRUCTIONS
No new restrictions Please eat a heart heathy diet low in sodium and fats Take medications as prescribed. Follow up with your doctor as directed. Shower daily with mild soap & water, pat sites dry. No wash cloth on incisions. No cream, lotion or oils on wounds. Increase activity slowly, as tolerated. Continue to use your spirometer and perform  your deep breathing & coughing exercises. No driving while on pain medication, no heavy lifting . Call your doctor for fever over 101 degrees, pain not relieved by pain medication or for any questions or concerns you may have.  Please call 911 for chest pain, shortness of breath or for signs & symptoms of stroke.

## 2018-01-22 LAB — SURGICAL PATHOLOGY STUDY: SIGNIFICANT CHANGE UP

## 2018-01-25 ENCOUNTER — OUTPATIENT (OUTPATIENT)
Dept: OUTPATIENT SERVICES | Facility: HOSPITAL | Age: 68
LOS: 1 days | Discharge: HOME | End: 2018-01-25

## 2018-01-25 DIAGNOSIS — Z98.890 OTHER SPECIFIED POSTPROCEDURAL STATES: Chronic | ICD-10-CM

## 2018-01-25 DIAGNOSIS — C80.1 MALIGNANT (PRIMARY) NEOPLASM, UNSPECIFIED: ICD-10-CM

## 2018-01-25 DIAGNOSIS — Z90.2 ACQUIRED ABSENCE OF LUNG [PART OF]: Chronic | ICD-10-CM

## 2018-01-25 DIAGNOSIS — Z90.89 ACQUIRED ABSENCE OF OTHER ORGANS: Chronic | ICD-10-CM

## 2018-01-26 ENCOUNTER — APPOINTMENT (OUTPATIENT)
Dept: HEMATOLOGY ONCOLOGY | Facility: CLINIC | Age: 68
End: 2018-01-26
Payer: COMMERCIAL

## 2018-01-26 VITALS
DIASTOLIC BLOOD PRESSURE: 70 MMHG | HEIGHT: 69.69 IN | BODY MASS INDEX: 21.23 KG/M2 | TEMPERATURE: 98 F | OXYGEN SATURATION: 98 % | WEIGHT: 146.61 LBS | SYSTOLIC BLOOD PRESSURE: 120 MMHG | HEART RATE: 72 BPM | RESPIRATION RATE: 16 BRPM

## 2018-01-26 PROCEDURE — 99214 OFFICE O/P EST MOD 30 MIN: CPT

## 2018-02-05 ENCOUNTER — APPOINTMENT (OUTPATIENT)
Dept: THORACIC SURGERY | Facility: CLINIC | Age: 68
End: 2018-02-05
Payer: COMMERCIAL

## 2018-02-05 VITALS
DIASTOLIC BLOOD PRESSURE: 60 MMHG | SYSTOLIC BLOOD PRESSURE: 104 MMHG | HEART RATE: 80 BPM | OXYGEN SATURATION: 98 % | BODY MASS INDEX: 21.19 KG/M2 | HEIGHT: 70 IN | WEIGHT: 148 LBS

## 2018-02-05 PROCEDURE — 99024 POSTOP FOLLOW-UP VISIT: CPT

## 2018-02-05 RX ORDER — TAMSULOSIN HYDROCHLORIDE 0.4 MG/1
0.4 CAPSULE ORAL
Refills: 0 | Status: DISCONTINUED | COMMUNITY
End: 2018-02-05

## 2018-02-05 RX ORDER — ACYCLOVIR 200 MG/1
200 CAPSULE ORAL
Refills: 0 | Status: DISCONTINUED | COMMUNITY
End: 2018-02-05

## 2018-02-26 RX ORDER — ATORVASTATIN CALCIUM 20 MG/1
20 TABLET, FILM COATED ORAL
Refills: 0 | Status: ACTIVE | COMMUNITY

## 2018-02-26 RX ORDER — SIMVASTATIN 20 MG/1
20 TABLET, FILM COATED ORAL
Refills: 0 | Status: DISCONTINUED | COMMUNITY
End: 2018-02-26

## 2018-03-16 NOTE — DISCHARGE NOTE ADULT - PLAN OF CARE
wound healing; treatment plan development when final pathology returns final pathology is pending Home You may remove the dressing in 48 hours, take a shower allowing warm water to run over incision pat dry and leave to air. Continue with daily ambulation and use of incentive spirometer. Please follow up with Dr. Trent in 2 weeks. Please follow up with Dr. Fernandez. Please call the office at  if you experience an increase in shortness of breath, fever, purulent discharge from site of incision and pain not relieved by medication or rest. You may remove the dressing in 48 hours, take a shower allowing warm water to run over incision pat dry and leave to air. Continue with daily ambulation and use of incentive spirometer.   Please follow up with Dr. Trent in 2 weeks. Please call the office at  if you experience an increase in shortness of breath, fever, purulent discharge from site of incision and pain not relieved by medication or rest. And to make your apt.  Have a chest xray done prior to that apt and then bring those images with you.

## 2018-05-22 ENCOUNTER — APPOINTMENT (OUTPATIENT)
Dept: THORACIC SURGERY | Facility: CLINIC | Age: 68
End: 2018-05-22
Payer: COMMERCIAL

## 2018-05-22 VITALS
OXYGEN SATURATION: 99 % | HEART RATE: 71 BPM | SYSTOLIC BLOOD PRESSURE: 110 MMHG | WEIGHT: 145 LBS | RESPIRATION RATE: 15 BRPM | HEIGHT: 70 IN | BODY MASS INDEX: 20.76 KG/M2 | DIASTOLIC BLOOD PRESSURE: 71 MMHG

## 2018-05-22 PROCEDURE — 99214 OFFICE O/P EST MOD 30 MIN: CPT

## 2018-05-22 RX ORDER — OLANZAPINE 5 MG/1
5 TABLET, FILM COATED ORAL DAILY
Qty: 16 | Refills: 1 | Status: DISCONTINUED | COMMUNITY
Start: 2017-10-03 | End: 2018-05-22

## 2018-05-22 RX ORDER — UBIDECARENONE 100 MG
100 CAPSULE ORAL
Refills: 0 | Status: ACTIVE | COMMUNITY

## 2018-07-14 ENCOUNTER — INPATIENT (INPATIENT)
Facility: HOSPITAL | Age: 68
LOS: 9 days | Discharge: HOME | End: 2018-07-24
Attending: SURGERY | Admitting: SURGERY
Payer: COMMERCIAL

## 2018-07-14 VITALS
HEART RATE: 75 BPM | OXYGEN SATURATION: 95 % | DIASTOLIC BLOOD PRESSURE: 75 MMHG | SYSTOLIC BLOOD PRESSURE: 153 MMHG | RESPIRATION RATE: 12 BRPM | TEMPERATURE: 98 F

## 2018-07-14 DIAGNOSIS — Z98.890 OTHER SPECIFIED POSTPROCEDURAL STATES: Chronic | ICD-10-CM

## 2018-07-14 DIAGNOSIS — S32.9XXA FRACTURE OF UNSPECIFIED PARTS OF LUMBOSACRAL SPINE AND PELVIS, INITIAL ENCOUNTER FOR CLOSED FRACTURE: ICD-10-CM
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
## 2018-07-14 DIAGNOSIS — E78.5 HYPERLIPIDEMIA, UNSPECIFIED: ICD-10-CM

## 2018-07-14 DIAGNOSIS — S32.502A UNSPECIFIED FRACTURE OF LEFT PUBIS, INITIAL ENCOUNTER FOR CLOSED FRACTURE: ICD-10-CM

## 2018-07-14 DIAGNOSIS — Z90.2 ACQUIRED ABSENCE OF LUNG [PART OF]: Chronic | ICD-10-CM

## 2018-07-14 DIAGNOSIS — B00.9 HERPESVIRAL INFECTION, UNSPECIFIED: ICD-10-CM
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
## 2018-07-14 DIAGNOSIS — Z90.89 ACQUIRED ABSENCE OF OTHER ORGANS: Chronic | ICD-10-CM

## 2018-07-14 DIAGNOSIS — E43 UNSPECIFIED SEVERE PROTEIN-CALORIE MALNUTRITION: ICD-10-CM

## 2018-07-14 DIAGNOSIS — H26.9 UNSPECIFIED CATARACT: ICD-10-CM

## 2018-07-14 DIAGNOSIS — S32.10XA UNSPECIFIED FRACTURE OF SACRUM, INITIAL ENCOUNTER FOR CLOSED FRACTURE: ICD-10-CM

## 2018-07-14 DIAGNOSIS — S01.121A LACERATION WITH FOREIGN BODY OF RIGHT EYELID AND PERIOCULAR AREA, INITIAL ENCOUNTER: ICD-10-CM
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
## 2018-07-14 DIAGNOSIS — D62 ACUTE POSTHEMORRHAGIC ANEMIA: ICD-10-CM
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
## 2018-07-14 LAB
ABO RH CONFIRMATION: SIGNIFICANT CHANGE UP
ALBUMIN SERPL ELPH-MCNC: 3.8 G/DL — SIGNIFICANT CHANGE UP (ref 3.3–5)
ALP SERPL-CCNC: 82 U/L — SIGNIFICANT CHANGE UP (ref 40–120)
ALT FLD-CCNC: 66 U/L — SIGNIFICANT CHANGE UP (ref 12–78)
ANION GAP SERPL CALC-SCNC: 7 MMOL/L — SIGNIFICANT CHANGE UP (ref 5–17)
APPEARANCE UR: CLEAR — SIGNIFICANT CHANGE UP
APTT BLD: 30.8 SEC — SIGNIFICANT CHANGE UP (ref 27.5–37.4)
AST SERPL-CCNC: 45 U/L — HIGH (ref 15–37)
BACTERIA # UR AUTO: NEGATIVE — SIGNIFICANT CHANGE UP
BASOPHILS # BLD AUTO: 0.02 K/UL — SIGNIFICANT CHANGE UP (ref 0–0.2)
BASOPHILS NFR BLD AUTO: 0.4 % — SIGNIFICANT CHANGE UP (ref 0–2)
BILIRUB SERPL-MCNC: 0.4 MG/DL — SIGNIFICANT CHANGE UP (ref 0.2–1.2)
BILIRUB UR-MCNC: NEGATIVE — SIGNIFICANT CHANGE UP
BLD GP AB SCN SERPL QL: SIGNIFICANT CHANGE UP
BUN SERPL-MCNC: 21 MG/DL — SIGNIFICANT CHANGE UP (ref 7–23)
CALCIUM SERPL-MCNC: 9.2 MG/DL — SIGNIFICANT CHANGE UP (ref 8.5–10.1)
CHLORIDE SERPL-SCNC: 103 MMOL/L — SIGNIFICANT CHANGE UP (ref 96–108)
CO2 SERPL-SCNC: 31 MMOL/L — SIGNIFICANT CHANGE UP (ref 22–31)
COLOR SPEC: YELLOW — SIGNIFICANT CHANGE UP
COMMENT - URINE: SIGNIFICANT CHANGE UP
CREAT SERPL-MCNC: 1.18 MG/DL — SIGNIFICANT CHANGE UP (ref 0.5–1.3)
DIFF PNL FLD: ABNORMAL
EOSINOPHIL # BLD AUTO: 0.12 K/UL — SIGNIFICANT CHANGE UP (ref 0–0.5)
EOSINOPHIL NFR BLD AUTO: 2.3 % — SIGNIFICANT CHANGE UP (ref 0–6)
EPI CELLS # UR: SIGNIFICANT CHANGE UP
ETHANOL SERPL-MCNC: <10 MG/DL — SIGNIFICANT CHANGE UP (ref 0–10)
GLUCOSE SERPL-MCNC: 105 MG/DL — HIGH (ref 70–99)
GLUCOSE UR QL: NEGATIVE MG/DL — SIGNIFICANT CHANGE UP
HCT VFR BLD CALC: 40.2 % — SIGNIFICANT CHANGE UP (ref 39–50)
HGB BLD-MCNC: 13.4 G/DL — SIGNIFICANT CHANGE UP (ref 13–17)
IMM GRANULOCYTES NFR BLD AUTO: 1 % — SIGNIFICANT CHANGE UP (ref 0–1.5)
INR BLD: 0.96 RATIO — SIGNIFICANT CHANGE UP (ref 0.88–1.16)
KETONES UR-MCNC: ABNORMAL
LACTATE SERPL-SCNC: 1.8 MMOL/L — SIGNIFICANT CHANGE UP (ref 0.7–2)
LACTATE SERPL-SCNC: 2.3 MMOL/L — HIGH (ref 0.7–2)
LEUKOCYTE ESTERASE UR-ACNC: NEGATIVE — SIGNIFICANT CHANGE UP
LIDOCAIN IGE QN: 104 U/L — SIGNIFICANT CHANGE UP (ref 73–393)
LYMPHOCYTES # BLD AUTO: 0.75 K/UL — LOW (ref 1–3.3)
LYMPHOCYTES # BLD AUTO: 14.3 % — SIGNIFICANT CHANGE UP (ref 13–44)
MCHC RBC-ENTMCNC: 32.9 PG — SIGNIFICANT CHANGE UP (ref 27–34)
MCHC RBC-ENTMCNC: 33.3 GM/DL — SIGNIFICANT CHANGE UP (ref 32–36)
MCV RBC AUTO: 98.8 FL — SIGNIFICANT CHANGE UP (ref 80–100)
MONOCYTES # BLD AUTO: 0.4 K/UL — SIGNIFICANT CHANGE UP (ref 0–0.9)
MONOCYTES NFR BLD AUTO: 7.6 % — SIGNIFICANT CHANGE UP (ref 2–14)
NEUTROPHILS # BLD AUTO: 3.91 K/UL — SIGNIFICANT CHANGE UP (ref 1.8–7.4)
NEUTROPHILS NFR BLD AUTO: 74.4 % — SIGNIFICANT CHANGE UP (ref 43–77)
NITRITE UR-MCNC: NEGATIVE — SIGNIFICANT CHANGE UP
NRBC # BLD: 0 /100 WBCS — SIGNIFICANT CHANGE UP (ref 0–0)
PH UR: 6.5 — SIGNIFICANT CHANGE UP (ref 5–8)
PLATELET # BLD AUTO: 152 K/UL — SIGNIFICANT CHANGE UP (ref 150–400)
POTASSIUM SERPL-MCNC: 4.3 MMOL/L — SIGNIFICANT CHANGE UP (ref 3.5–5.3)
POTASSIUM SERPL-SCNC: 4.3 MMOL/L — SIGNIFICANT CHANGE UP (ref 3.5–5.3)
PROT SERPL-MCNC: 7.1 GM/DL — SIGNIFICANT CHANGE UP (ref 6–8.3)
PROT UR-MCNC: 15 MG/DL
PROTHROM AB SERPL-ACNC: 10.4 SEC — SIGNIFICANT CHANGE UP (ref 9.8–12.7)
RBC # BLD: 4.07 M/UL — LOW (ref 4.2–5.8)
RBC # FLD: 13.1 % — SIGNIFICANT CHANGE UP (ref 10.3–14.5)
RBC CASTS # UR COMP ASSIST: SIGNIFICANT CHANGE UP /HPF (ref 0–4)
SODIUM SERPL-SCNC: 141 MMOL/L — SIGNIFICANT CHANGE UP (ref 135–145)
SP GR SPEC: 1.01 — SIGNIFICANT CHANGE UP (ref 1.01–1.02)
TYPE + AB SCN PNL BLD: SIGNIFICANT CHANGE UP
UROBILINOGEN FLD QL: NEGATIVE MG/DL — SIGNIFICANT CHANGE UP
WBC # BLD: 5.25 K/UL — SIGNIFICANT CHANGE UP (ref 3.8–10.5)
WBC # FLD AUTO: 5.25 K/UL — SIGNIFICANT CHANGE UP (ref 3.8–10.5)
WBC UR QL: SIGNIFICANT CHANGE UP

## 2018-07-14 PROCEDURE — 74177 CT ABD & PELVIS W/CONTRAST: CPT | Mod: 26

## 2018-07-14 PROCEDURE — 71045 X-RAY EXAM CHEST 1 VIEW: CPT | Mod: 26

## 2018-07-14 PROCEDURE — 73552 X-RAY EXAM OF FEMUR 2/>: CPT | Mod: 26

## 2018-07-14 PROCEDURE — 72190 X-RAY EXAM OF PELVIS: CPT | Mod: 26

## 2018-07-14 PROCEDURE — 99223 1ST HOSP IP/OBS HIGH 75: CPT

## 2018-07-14 PROCEDURE — 71260 CT THORAX DX C+: CPT | Mod: 26

## 2018-07-14 PROCEDURE — 99291 CRITICAL CARE FIRST HOUR: CPT

## 2018-07-14 PROCEDURE — 93010 ELECTROCARDIOGRAM REPORT: CPT

## 2018-07-14 PROCEDURE — 70450 CT HEAD/BRAIN W/O DYE: CPT | Mod: 26

## 2018-07-14 PROCEDURE — 72125 CT NECK SPINE W/O DYE: CPT | Mod: 26

## 2018-07-14 RX ORDER — SODIUM CHLORIDE 9 MG/ML
1000 INJECTION INTRAMUSCULAR; INTRAVENOUS; SUBCUTANEOUS
Qty: 0 | Refills: 0 | Status: DISCONTINUED | OUTPATIENT
Start: 2018-07-14 | End: 2018-07-16

## 2018-07-14 RX ORDER — SODIUM CHLORIDE 9 MG/ML
1000 INJECTION INTRAMUSCULAR; INTRAVENOUS; SUBCUTANEOUS ONCE
Qty: 0 | Refills: 0 | Status: COMPLETED | OUTPATIENT
Start: 2018-07-14 | End: 2018-07-14

## 2018-07-14 RX ORDER — OXYCODONE HYDROCHLORIDE 5 MG/1
5 TABLET ORAL EVERY 4 HOURS
Qty: 0 | Refills: 0 | Status: DISCONTINUED | OUTPATIENT
Start: 2018-07-14 | End: 2018-07-21

## 2018-07-14 RX ORDER — ONDANSETRON 8 MG/1
8 TABLET, FILM COATED ORAL ONCE
Qty: 0 | Refills: 0 | Status: COMPLETED | OUTPATIENT
Start: 2018-07-14 | End: 2018-07-14

## 2018-07-14 RX ORDER — OXYCODONE HYDROCHLORIDE 5 MG/1
10 TABLET ORAL EVERY 4 HOURS
Qty: 0 | Refills: 0 | Status: DISCONTINUED | OUTPATIENT
Start: 2018-07-14 | End: 2018-07-21

## 2018-07-14 RX ORDER — HEPARIN SODIUM 5000 [USP'U]/ML
5000 INJECTION INTRAVENOUS; SUBCUTANEOUS EVERY 8 HOURS
Qty: 0 | Refills: 0 | Status: DISCONTINUED | OUTPATIENT
Start: 2018-07-14 | End: 2018-07-17

## 2018-07-14 RX ORDER — ASPIRIN/CALCIUM CARB/MAGNESIUM 324 MG
81 TABLET ORAL DAILY
Qty: 0 | Refills: 0 | Status: DISCONTINUED | OUTPATIENT
Start: 2018-07-14 | End: 2018-07-15

## 2018-07-14 RX ORDER — ONDANSETRON 8 MG/1
4 TABLET, FILM COATED ORAL EVERY 6 HOURS
Qty: 0 | Refills: 0 | Status: DISCONTINUED | OUTPATIENT
Start: 2018-07-14 | End: 2018-07-24

## 2018-07-14 RX ORDER — MORPHINE SULFATE 50 MG/1
4 CAPSULE, EXTENDED RELEASE ORAL ONCE
Qty: 0 | Refills: 0 | Status: DISCONTINUED | OUTPATIENT
Start: 2018-07-14 | End: 2018-07-14

## 2018-07-14 RX ORDER — ACYCLOVIR SODIUM 500 MG
500 VIAL (EA) INTRAVENOUS DAILY
Qty: 0 | Refills: 0 | Status: DISCONTINUED | OUTPATIENT
Start: 2018-07-14 | End: 2018-07-14

## 2018-07-14 RX ADMIN — SODIUM CHLORIDE 1000 MILLILITER(S): 9 INJECTION INTRAMUSCULAR; INTRAVENOUS; SUBCUTANEOUS at 19:30

## 2018-07-14 RX ADMIN — SODIUM CHLORIDE 1000 MILLILITER(S): 9 INJECTION INTRAMUSCULAR; INTRAVENOUS; SUBCUTANEOUS at 20:06

## 2018-07-14 RX ADMIN — ONDANSETRON 8 MILLIGRAM(S): 8 TABLET, FILM COATED ORAL at 18:57

## 2018-07-14 RX ADMIN — MORPHINE SULFATE 4 MILLIGRAM(S): 50 CAPSULE, EXTENDED RELEASE ORAL at 18:40

## 2018-07-14 RX ADMIN — Medication 81 MILLIGRAM(S): at 23:55

## 2018-07-14 RX ADMIN — HEPARIN SODIUM 5000 UNIT(S): 5000 INJECTION INTRAVENOUS; SUBCUTANEOUS at 23:55
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
## 2018-07-14 NOTE — H&P ADULT - NSHPPHYSICALEXAM_GEN_ALL_CORE
Vital Signs Last 24 Hrs  T(C): 36.8 (14 Jul 2018 18:26), Max: 36.8 (14 Jul 2018 18:26)  T(F): 98.2 (14 Jul 2018 18:26), Max: 98.2 (14 Jul 2018 18:26)  HR: 75 (14 Jul 2018 18:26) (75 - 75)  BP: 153/75 (14 Jul 2018 18:26) (153/75 - 153/75)  BP(mean): --  RR: 12 (14 Jul 2018 18:26) (12 - 12)  SpO2: 95% (14 Jul 2018 18:26) (95% - 95%)

## 2018-07-14 NOTE — H&P ADULT - RS GEN PE MLT RESP DETAILS PC
no chest wall tenderness/no intercostal retractions/respirations non-labored/clear to auscultation bilaterally
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
## 2018-07-14 NOTE — ED ADULT NURSE NOTE - ED STAT RN HANDOFF DETAILS
Received report from GUTIERREZ Young and reassessed patient. Agree with the previous RN assessment. Patient is to be admitted. Patient awaiting surgical/trauma MD admission assessment/bed placement. Patient at his previously noted status no current complaints or pain at this time. Dressings clean, dry, and intact. Patient and wife updated on status and plan of care. please see paper trauma flow sheet completed by Hari Young for additional charting. Will continue to monitor/reassess.
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
## 2018-07-14 NOTE — H&P ADULT - NEUROLOGICAL
Normal vision: sees adequately in most situations; can see medication labels, newsprint Alert & oriented; no sensory, motor or coordination deficits, normal reflexes
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
## 2018-07-14 NOTE — ED PROVIDER NOTE - CONSTITUTIONAL, MLM
normal... Awake, alert, oriented to person, place, time/situation. Deformed Awake, alert, oriented to person, place, time/situation. y shaped laceration right inferior medial brow.

## 2018-07-14 NOTE — ED PROVIDER NOTE - SKIN, MLM
No evidence of rash. deformed laceration to right frontal area. No evidence of rash. deformed laceration to right frontal area. Abrasion to left hip. No evidence of rash. deformed laceration to right frontal area. Abrasion to left hip. Tender. Holding hip flexed.
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
## 2018-07-14 NOTE — ED PROVIDER NOTE - MUSCULOSKELETAL, MLM
Spine appears normal, range of motion is not limited, no muscle or joint tenderness Spine appears normal, range of motion is not limited, no muscle or joint tenderness. Deformity and swelling of left upper leg Spine appears normal, range of motion is not limited, tender left hip and left thigh with small abrasion. Deformity and swelling of left upper leg. Pt holding hip flexed. Good distal pulses. Sensory motor pos distallly.

## 2018-07-14 NOTE — ED ADULT NURSE NOTE - CAS EDN DISCHARGE ASSESSMENT
Neuro vascular intact post splinting/Dressing clean and dry/No adverse reaction to first time med in ED/Alert and oriented to person, place and time

## 2018-07-15 LAB
ALBUMIN SERPL ELPH-MCNC: 2.8 G/DL — LOW (ref 3.3–5)
ALP SERPL-CCNC: 55 U/L — SIGNIFICANT CHANGE UP (ref 40–120)
ALT FLD-CCNC: 52 U/L — SIGNIFICANT CHANGE UP (ref 12–78)
ANION GAP SERPL CALC-SCNC: 7 MMOL/L — SIGNIFICANT CHANGE UP (ref 5–17)
AST SERPL-CCNC: 39 U/L — HIGH (ref 15–37)
BASOPHILS # BLD AUTO: 0.01 K/UL — SIGNIFICANT CHANGE UP (ref 0–0.2)
BASOPHILS NFR BLD AUTO: 0.1 % — SIGNIFICANT CHANGE UP (ref 0–2)
BILIRUB DIRECT SERPL-MCNC: 0.1 MG/DL — SIGNIFICANT CHANGE UP (ref 0–0.2)
BILIRUB INDIRECT FLD-MCNC: 0.4 MG/DL — SIGNIFICANT CHANGE UP (ref 0.2–1)
BILIRUB SERPL-MCNC: 0.5 MG/DL — SIGNIFICANT CHANGE UP (ref 0.2–1.2)
BUN SERPL-MCNC: 18 MG/DL — SIGNIFICANT CHANGE UP (ref 7–23)
CALCIUM SERPL-MCNC: 8.2 MG/DL — LOW (ref 8.5–10.1)
CHLORIDE SERPL-SCNC: 107 MMOL/L — SIGNIFICANT CHANGE UP (ref 96–108)
CO2 SERPL-SCNC: 26 MMOL/L — SIGNIFICANT CHANGE UP (ref 22–31)
CREAT SERPL-MCNC: 1.05 MG/DL — SIGNIFICANT CHANGE UP (ref 0.5–1.3)
EOSINOPHIL # BLD AUTO: 0 K/UL — SIGNIFICANT CHANGE UP (ref 0–0.5)
EOSINOPHIL NFR BLD AUTO: 0 % — SIGNIFICANT CHANGE UP (ref 0–6)
GLUCOSE SERPL-MCNC: 114 MG/DL — HIGH (ref 70–99)
HCT VFR BLD CALC: 30.7 % — LOW (ref 39–50)
HGB BLD-MCNC: 10.3 G/DL — LOW (ref 13–17)
IMM GRANULOCYTES NFR BLD AUTO: 0.9 % — SIGNIFICANT CHANGE UP (ref 0–1.5)
LYMPHOCYTES # BLD AUTO: 0.53 K/UL — LOW (ref 1–3.3)
LYMPHOCYTES # BLD AUTO: 7.6 % — LOW (ref 13–44)
MANUAL SMEAR VERIFICATION: SIGNIFICANT CHANGE UP
MCHC RBC-ENTMCNC: 32.6 PG — SIGNIFICANT CHANGE UP (ref 27–34)
MCHC RBC-ENTMCNC: 33.6 GM/DL — SIGNIFICANT CHANGE UP (ref 32–36)
MCV RBC AUTO: 97.2 FL — SIGNIFICANT CHANGE UP (ref 80–100)
MONOCYTES # BLD AUTO: 0.56 K/UL — SIGNIFICANT CHANGE UP (ref 0–0.9)
MONOCYTES NFR BLD AUTO: 8.1 % — SIGNIFICANT CHANGE UP (ref 2–14)
NEUTROPHILS # BLD AUTO: 5.79 K/UL — SIGNIFICANT CHANGE UP (ref 1.8–7.4)
NEUTROPHILS NFR BLD AUTO: 83.3 % — HIGH (ref 43–77)
NRBC # BLD: 0 /100 WBCS — SIGNIFICANT CHANGE UP (ref 0–0)
PLAT MORPH BLD: NORMAL — SIGNIFICANT CHANGE UP
PLATELET # BLD AUTO: 115 K/UL — LOW (ref 150–400)
POTASSIUM SERPL-MCNC: 4.4 MMOL/L — SIGNIFICANT CHANGE UP (ref 3.5–5.3)
POTASSIUM SERPL-SCNC: 4.4 MMOL/L — SIGNIFICANT CHANGE UP (ref 3.5–5.3)
PROT SERPL-MCNC: 5.5 GM/DL — LOW (ref 6–8.3)
RBC # BLD: 3.16 M/UL — LOW (ref 4.2–5.8)
RBC # FLD: 13.2 % — SIGNIFICANT CHANGE UP (ref 10.3–14.5)
RBC BLD AUTO: NORMAL — SIGNIFICANT CHANGE UP
SODIUM SERPL-SCNC: 140 MMOL/L — SIGNIFICANT CHANGE UP (ref 135–145)
WBC # BLD: 6.95 K/UL — SIGNIFICANT CHANGE UP (ref 3.8–10.5)
WBC # FLD AUTO: 6.95 K/UL — SIGNIFICANT CHANGE UP (ref 3.8–10.5)

## 2018-07-15 RX ORDER — ACETAMINOPHEN 500 MG
650 TABLET ORAL EVERY 6 HOURS
Qty: 0 | Refills: 0 | Status: DISCONTINUED | OUTPATIENT
Start: 2018-07-15 | End: 2018-07-24

## 2018-07-15 RX ORDER — ASPIRIN/CALCIUM CARB/MAGNESIUM 324 MG
325 TABLET ORAL DAILY
Qty: 0 | Refills: 0 | Status: DISCONTINUED | OUTPATIENT
Start: 2018-07-15 | End: 2018-07-17

## 2018-07-15 RX ORDER — ACYCLOVIR SODIUM 500 MG
400 VIAL (EA) INTRAVENOUS DAILY
Qty: 0 | Refills: 0 | Status: DISCONTINUED | OUTPATIENT
Start: 2018-07-15 | End: 2018-07-15

## 2018-07-15 RX ORDER — KETOROLAC TROMETHAMINE 30 MG/ML
15 SYRINGE (ML) INJECTION ONCE
Qty: 0 | Refills: 0 | Status: DISCONTINUED | OUTPATIENT
Start: 2018-07-15 | End: 2018-07-15

## 2018-07-15 RX ORDER — LORATADINE 10 MG/1
10 TABLET ORAL DAILY
Qty: 0 | Refills: 0 | Status: DISCONTINUED | OUTPATIENT
Start: 2018-07-15 | End: 2018-07-24

## 2018-07-15 RX ORDER — VALACYCLOVIR 500 MG/1
500 TABLET, FILM COATED ORAL DAILY
Qty: 0 | Refills: 0 | Status: DISCONTINUED | OUTPATIENT
Start: 2018-07-15 | End: 2018-07-24

## 2018-07-15 RX ORDER — BACITRACIN ZINC 500 UNIT/G
1 OINTMENT IN PACKET (EA) TOPICAL
Qty: 0 | Refills: 0 | Status: DISCONTINUED | OUTPATIENT
Start: 2018-07-15 | End: 2018-07-24

## 2018-07-15 RX ADMIN — SODIUM CHLORIDE 125 MILLILITER(S): 9 INJECTION INTRAMUSCULAR; INTRAVENOUS; SUBCUTANEOUS at 00:54

## 2018-07-15 RX ADMIN — OXYCODONE HYDROCHLORIDE 5 MILLIGRAM(S): 5 TABLET ORAL at 12:55

## 2018-07-15 RX ADMIN — OXYCODONE HYDROCHLORIDE 5 MILLIGRAM(S): 5 TABLET ORAL at 21:15

## 2018-07-15 RX ADMIN — HEPARIN SODIUM 5000 UNIT(S): 5000 INJECTION INTRAVENOUS; SUBCUTANEOUS at 21:06

## 2018-07-15 RX ADMIN — SODIUM CHLORIDE 125 MILLILITER(S): 9 INJECTION INTRAMUSCULAR; INTRAVENOUS; SUBCUTANEOUS at 06:18

## 2018-07-15 RX ADMIN — OXYCODONE HYDROCHLORIDE 5 MILLIGRAM(S): 5 TABLET ORAL at 12:00

## 2018-07-15 RX ADMIN — Medication 15 MILLIGRAM(S): at 00:54

## 2018-07-15 RX ADMIN — OXYCODONE HYDROCHLORIDE 5 MILLIGRAM(S): 5 TABLET ORAL at 20:47

## 2018-07-15 RX ADMIN — LORATADINE 10 MILLIGRAM(S): 10 TABLET ORAL at 18:23

## 2018-07-15 RX ADMIN — HEPARIN SODIUM 5000 UNIT(S): 5000 INJECTION INTRAVENOUS; SUBCUTANEOUS at 06:18

## 2018-07-15 RX ADMIN — Medication 325 MILLIGRAM(S): at 12:00

## 2018-07-15 RX ADMIN — VALACYCLOVIR 500 MILLIGRAM(S): 500 TABLET, FILM COATED ORAL at 18:23

## 2018-07-15 RX ADMIN — HEPARIN SODIUM 5000 UNIT(S): 5000 INJECTION INTRAVENOUS; SUBCUTANEOUS at 15:23

## 2018-07-15 RX ADMIN — Medication 1 APPLICATION(S): at 15:43
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
## 2018-07-15 NOTE — PROGRESS NOTE ADULT - SUBJECTIVE AND OBJECTIVE BOX
BERTA SUN67y      aspirin 325 milliGRAM(s) Oral daily  BACItracin   Ointment 1 Application(s) Topical two times a day  heparin  Injectable 5000 Unit(s) SubCutaneous every 8 hours  ondansetron Injectable 4 milliGRAM(s) IV Push every 6 hours PRN  oxyCODONE    IR 5 milliGRAM(s) Oral every 4 hours PRN  oxyCODONE    IR 10 milliGRAM(s) Oral every 4 hours PRN  sodium chloride 0.9%. 1000 milliLiter(s) IV Continuous <Continuous>        Physical Exam  T(C): 37.1 (07-15-18 @ 13:25), Max: 38.4 (07-14-18 @ 23:23)  HR: 75 (07-15-18 @ 13:25) (72 - 80)  BP: 98/63 (07-15-18 @ 13:25) (98/59 - 153/75)  RR: 16 (07-15-18 @ 13:25) (12 - 16)  SpO2: 97% (07-15-18 @ 13:25) (94% - 98%)  Wt(kg): --  Constitutional  Resting comfortably  Lungs-  clear  Cor-RRR    Abd-  soft non tender    Ext-  neurovascularly intact
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
## 2018-07-15 NOTE — PHYSICAL THERAPY INITIAL EVALUATION ADULT - CRITERIA FOR SKILLED THERAPEUTIC INTERVENTIONS
therapy frequency/anticipated equipment needs at discharge/predicted duration of therapy intervention/risk reduction/prevention/rehab potential/anticipated discharge recommendation/impairments found
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
## 2018-07-16 PROBLEM — D70.9 NEUTROPENIA, UNSPECIFIED: Chronic | Status: ACTIVE | Noted: 2017-08-02

## 2018-07-16 PROBLEM — R91.1 SOLITARY PULMONARY NODULE: Chronic | Status: ACTIVE | Noted: 2017-08-02

## 2018-07-16 LAB
ANION GAP SERPL CALC-SCNC: 3 MMOL/L — LOW (ref 5–17)
BUN SERPL-MCNC: 11 MG/DL — SIGNIFICANT CHANGE UP (ref 7–23)
CALCIUM SERPL-MCNC: 7.6 MG/DL — LOW (ref 8.5–10.1)
CHLORIDE SERPL-SCNC: 104 MMOL/L — SIGNIFICANT CHANGE UP (ref 96–108)
CO2 SERPL-SCNC: 29 MMOL/L — SIGNIFICANT CHANGE UP (ref 22–31)
CREAT SERPL-MCNC: 0.88 MG/DL — SIGNIFICANT CHANGE UP (ref 0.5–1.3)
GLUCOSE SERPL-MCNC: 139 MG/DL — HIGH (ref 70–99)
HCT VFR BLD CALC: 24 % — LOW (ref 39–50)
HGB BLD-MCNC: 8.1 G/DL — LOW (ref 13–17)
MCHC RBC-ENTMCNC: 33.1 PG — SIGNIFICANT CHANGE UP (ref 27–34)
MCHC RBC-ENTMCNC: 33.8 GM/DL — SIGNIFICANT CHANGE UP (ref 32–36)
MCV RBC AUTO: 98 FL — SIGNIFICANT CHANGE UP (ref 80–100)
NRBC # BLD: 0 /100 WBCS — SIGNIFICANT CHANGE UP (ref 0–0)
PLATELET # BLD AUTO: 90 K/UL — LOW (ref 150–400)
POTASSIUM SERPL-MCNC: 3.8 MMOL/L — SIGNIFICANT CHANGE UP (ref 3.5–5.3)
POTASSIUM SERPL-SCNC: 3.8 MMOL/L — SIGNIFICANT CHANGE UP (ref 3.5–5.3)
RBC # BLD: 2.45 M/UL — LOW (ref 4.2–5.8)
RBC # FLD: 13.2 % — SIGNIFICANT CHANGE UP (ref 10.3–14.5)
SODIUM SERPL-SCNC: 136 MMOL/L — SIGNIFICANT CHANGE UP (ref 135–145)
WBC # BLD: 5.65 K/UL — SIGNIFICANT CHANGE UP (ref 3.8–10.5)
WBC # FLD AUTO: 5.65 K/UL — SIGNIFICANT CHANGE UP (ref 3.8–10.5)

## 2018-07-16 RX ORDER — DOCUSATE SODIUM 100 MG
100 CAPSULE ORAL
Qty: 0 | Refills: 0 | Status: DISCONTINUED | OUTPATIENT
Start: 2018-07-16 | End: 2018-07-18

## 2018-07-16 RX ADMIN — SODIUM CHLORIDE 125 MILLILITER(S): 9 INJECTION INTRAMUSCULAR; INTRAVENOUS; SUBCUTANEOUS at 07:53

## 2018-07-16 RX ADMIN — OXYCODONE HYDROCHLORIDE 5 MILLIGRAM(S): 5 TABLET ORAL at 04:15

## 2018-07-16 RX ADMIN — HEPARIN SODIUM 5000 UNIT(S): 5000 INJECTION INTRAVENOUS; SUBCUTANEOUS at 15:19

## 2018-07-16 RX ADMIN — Medication 325 MILLIGRAM(S): at 11:03

## 2018-07-16 RX ADMIN — VALACYCLOVIR 500 MILLIGRAM(S): 500 TABLET, FILM COATED ORAL at 17:59

## 2018-07-16 RX ADMIN — SODIUM CHLORIDE 125 MILLILITER(S): 9 INJECTION INTRAMUSCULAR; INTRAVENOUS; SUBCUTANEOUS at 17:58

## 2018-07-16 RX ADMIN — Medication 650 MILLIGRAM(S): at 08:40

## 2018-07-16 RX ADMIN — LORATADINE 10 MILLIGRAM(S): 10 TABLET ORAL at 11:03

## 2018-07-16 RX ADMIN — Medication 650 MILLIGRAM(S): at 18:00

## 2018-07-16 RX ADMIN — HEPARIN SODIUM 5000 UNIT(S): 5000 INJECTION INTRAVENOUS; SUBCUTANEOUS at 21:59

## 2018-07-16 RX ADMIN — Medication 1 APPLICATION(S): at 06:26

## 2018-07-16 RX ADMIN — Medication 1 APPLICATION(S): at 17:58

## 2018-07-16 RX ADMIN — Medication 100 MILLIGRAM(S): at 17:59

## 2018-07-16 RX ADMIN — HEPARIN SODIUM 5000 UNIT(S): 5000 INJECTION INTRAVENOUS; SUBCUTANEOUS at 06:26

## 2018-07-16 NOTE — PROGRESS NOTE ADULT - SUBJECTIVE AND OBJECTIVE BOX
BERTA SUN67y      acetaminophen   Tablet. 650 milliGRAM(s) Oral every 6 hours PRN  aspirin 325 milliGRAM(s) Oral daily  BACItracin   Ointment 1 Application(s) Topical two times a day  docusate sodium 100 milliGRAM(s) Oral two times a day  heparin  Injectable 5000 Unit(s) SubCutaneous every 8 hours  loratadine 10 milliGRAM(s) Oral daily  ondansetron Injectable 4 milliGRAM(s) IV Push every 6 hours PRN  oxyCODONE    IR 5 milliGRAM(s) Oral every 4 hours PRN  oxyCODONE    IR 10 milliGRAM(s) Oral every 4 hours PRN  valACYclovir 500 milliGRAM(s) Oral daily        Physical Exam  T(C): 37.6 (07-16-18 @ 21:10), Max: 37.6 (07-16-18 @ 21:10)  HR: 79 (07-16-18 @ 21:10) (71 - 79)  BP: 131/53 (07-16-18 @ 21:10) (112/50 - 131/53)  RR: 18 (07-16-18 @ 21:10) (16 - 18)  SpO2: 98% (07-16-18 @ 21:10) (95% - 98%)  Wt(kg): --  Constitutional  Complains of pain,weakness left leg  Lungs-  clear  Cor-  RRR  Abd-  soft non tender    Ext-edema left thigh.

## 2018-07-17 LAB
BLD GP AB SCN SERPL QL: SIGNIFICANT CHANGE UP
HCT VFR BLD CALC: 21.5 % — LOW (ref 39–50)
HGB BLD-MCNC: 7.3 G/DL — LOW (ref 13–17)
MCHC RBC-ENTMCNC: 33.2 PG — SIGNIFICANT CHANGE UP (ref 27–34)
MCHC RBC-ENTMCNC: 34 GM/DL — SIGNIFICANT CHANGE UP (ref 32–36)
MCV RBC AUTO: 97.7 FL — SIGNIFICANT CHANGE UP (ref 80–100)
NRBC # BLD: 0 /100 WBCS — SIGNIFICANT CHANGE UP (ref 0–0)
PLATELET # BLD AUTO: 101 K/UL — LOW (ref 150–400)
RBC # BLD: 2.2 M/UL — LOW (ref 4.2–5.8)
RBC # FLD: 13 % — SIGNIFICANT CHANGE UP (ref 10.3–14.5)
TYPE + AB SCN PNL BLD: SIGNIFICANT CHANGE UP
WBC # BLD: 5.57 K/UL — SIGNIFICANT CHANGE UP (ref 3.8–10.5)
WBC # FLD AUTO: 5.57 K/UL — SIGNIFICANT CHANGE UP (ref 3.8–10.5)

## 2018-07-17 PROCEDURE — 76881 US COMPL JOINT R-T W/IMG: CPT | Mod: 26,LT

## 2018-07-17 PROCEDURE — 72191 CT ANGIOGRAPH PELV W/O&W/DYE: CPT | Mod: 26

## 2018-07-17 PROCEDURE — 99232 SBSQ HOSP IP/OBS MODERATE 35: CPT

## 2018-07-17 RX ORDER — DEXTROSE MONOHYDRATE, SODIUM CHLORIDE, AND POTASSIUM CHLORIDE 50; .745; 4.5 G/1000ML; G/1000ML; G/1000ML
1000 INJECTION, SOLUTION INTRAVENOUS
Qty: 0 | Refills: 0 | Status: DISCONTINUED | OUTPATIENT
Start: 2018-07-17 | End: 2018-07-19

## 2018-07-17 RX ADMIN — VALACYCLOVIR 500 MILLIGRAM(S): 500 TABLET, FILM COATED ORAL at 18:00

## 2018-07-17 RX ADMIN — Medication 325 MILLIGRAM(S): at 12:08

## 2018-07-17 RX ADMIN — Medication 1 APPLICATION(S): at 06:04

## 2018-07-17 RX ADMIN — DEXTROSE MONOHYDRATE, SODIUM CHLORIDE, AND POTASSIUM CHLORIDE 125 MILLILITER(S): 50; .745; 4.5 INJECTION, SOLUTION INTRAVENOUS at 12:09

## 2018-07-17 RX ADMIN — DEXTROSE MONOHYDRATE, SODIUM CHLORIDE, AND POTASSIUM CHLORIDE 125 MILLILITER(S): 50; .745; 4.5 INJECTION, SOLUTION INTRAVENOUS at 21:32

## 2018-07-17 RX ADMIN — LORATADINE 10 MILLIGRAM(S): 10 TABLET ORAL at 12:08

## 2018-07-17 RX ADMIN — Medication 1 APPLICATION(S): at 21:16

## 2018-07-17 RX ADMIN — HEPARIN SODIUM 5000 UNIT(S): 5000 INJECTION INTRAVENOUS; SUBCUTANEOUS at 06:04

## 2018-07-17 RX ADMIN — Medication 100 MILLIGRAM(S): at 06:04

## 2018-07-17 RX ADMIN — Medication 100 MILLIGRAM(S): at 18:00

## 2018-07-17 NOTE — PROGRESS NOTE ADULT - SUBJECTIVE AND OBJECTIVE BOX
BERTA SUN67y      acetaminophen   Tablet. 650 milliGRAM(s) Oral every 6 hours PRN  BACItracin   Ointment 1 Application(s) Topical two times a day  dextrose 5% + sodium chloride 0.45% with potassium chloride 20 mEq/L 1000 milliLiter(s) IV Continuous <Continuous>  docusate sodium 100 milliGRAM(s) Oral two times a day  loratadine 10 milliGRAM(s) Oral daily  ondansetron Injectable 4 milliGRAM(s) IV Push every 6 hours PRN  oxyCODONE    IR 5 milliGRAM(s) Oral every 4 hours PRN  oxyCODONE    IR 10 milliGRAM(s) Oral every 4 hours PRN  valACYclovir 500 milliGRAM(s) Oral daily        Physical Exam  T(C): 37.4 (07-17-18 @ 05:13), Max: 37.6 (07-16-18 @ 21:10)  HR: 77 (07-17-18 @ 05:13) (77 - 79)  BP: 99/56 (07-17-18 @ 05:13) (99/56 - 131/53)  RR: 18 (07-17-18 @ 05:13) (18 - 18)  SpO2: 99% (07-17-18 @ 05:13) (98% - 99%)  Wt(kg): --  Constitutional  Right thigh pain  Lungs-  clear  Cor-RRR    Abd-  soft, non tender    Ext-swelling left thigh, soft, not tender to touch

## 2018-07-17 NOTE — PROGRESS NOTE ADULT - SUBJECTIVE AND OBJECTIVE BOX
S: Somewhat More comfortable  O: Thigh appears slightly less swollen and is soft. Wiggles toes easily. Moves ankle well.  A/P:  Applied compressive wrap to left thigh with ACE and webril. Pt tolerated well without discomfort  OOB TTWB  SCDs  large ICE bag applied  Discussed with Dr. Gaviria
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
## 2018-07-17 NOTE — PROGRESS NOTE ADULT - ASSESSMENT
Left thigh hematoma, possible active bleed. Vascular evaluation. IR consult for possible embolization.

## 2018-07-17 NOTE — PROGRESS NOTE ADULT - SUBJECTIVE AND OBJECTIVE BOX
Subjective: Patient's hemoglobin and hematocrit dropping-- called by nurse.  Patient sitting in bed in NAD.  + cough and extremity/ pelvic pain with movement.    Objective:     Heent: N/C, AT PER no scleral icterus, No JVD, oropharynx WNL, throat with no erythema  lungs: CTA B/L, no W/R/R  Cor: RRR  Abdomen: soft, normal BS  Extremities: Left proximal thigh with + swelling, firm to touch    07-16    136  |  104  |  11  ----------------------------<  139<H>  3.8   |  29  |  0.88    Ca    7.6<L>      16 Jul 2018 09:01                              7.3    5.57  )-----------( 101      ( 17 Jul 2018 05:32 )             21.5     Vital Signs Last 24 Hrs  T(C): 37.4 (17 Jul 2018 05:13), Max: 37.6 (16 Jul 2018 21:10)  T(F): 99.4 (17 Jul 2018 05:13), Max: 99.6 (16 Jul 2018 21:10)  HR: 77 (17 Jul 2018 05:13) (76 - 79)  BP: 99/56 (17 Jul 2018 05:13) (99/56 - 131/53)  BP(mean): --  RR: 18 (17 Jul 2018 05:13) (17 - 18)  SpO2: 99% (17 Jul 2018 05:13) (96% - 99%)

## 2018-07-17 NOTE — PROGRESS NOTE ADULT - SUBJECTIVE AND OBJECTIVE BOX
S: LEft thigh feeling heavy. Feels swelling is less today. denies paresthesias. Not lightheaded.  O: AAOx3 Calm, pleasant, sitting up in bed  LLE: Left thigh swollen but soft. Ecchymosis noted laterally   SILT toes 1-5  +EHL FHL TA GS  CAlf soft NT    A/P:  -Spoke with Dr. Saldivar as well as IR Dr. Chan. Neither believes active small vessel branch in sartorius is main culprit, but rather pelvic fxs with H&H drifting down secondary to this. Dr. Vega recommended arterial doppler (ordered) to further eval and will see pt  -IR luh not feel need for any intervention at this time, recommended Blood transfusion and platelets and to hold DVT PE ppx at this time.  -Will apply compressive thigh wrap after Doppler, as recommended by Dr. Gaviria  -OOB TTWB LLE  -Applied large Ice bag to thigh with pillow case buffer. Continue this at all times possible.  -Above discussed with Dr. Gaviria today. S: LEft thigh feeling heavy. Feels swelling is less today. denies paresthesias. Not lightheaded.  O: AAOx3 Calm, pleasant, sitting up in bed  LLE: Left thigh swollen but soft. Ecchymosis noted laterally   SILT toes 1-5  +EHL FHL TA GS  CAlf soft NT    A/P:  -Spoke with Dr. Saldivar as well as IR Dr. Mejía. Neither believes active small vessel branch in sartorius is main culprit, but rather pelvic fxs with H&H drifting down secondary to this. Dr. Vega recommended arterial doppler (ordered) to further eval and will see pt  -IR does not feel need for any intervention at this time, recommended Blood transfusion and platelets and to hold DVT PE ppx at this time.  -Will apply compressive thigh wrap after Doppler, as recommended by Dr. Gaviria  -OOB TTWB LLE  -Applied large Ice bag to thigh with pillow case buffer. Continue this at all times possible.  -Above discussed with Dr. Gaviria today.
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
## 2018-07-18 LAB
HCT VFR BLD CALC: 20.9 % — CRITICAL LOW (ref 39–50)
HCT VFR BLD CALC: 26.1 % — LOW (ref 39–50)
HGB BLD-MCNC: 6.9 G/DL — CRITICAL LOW (ref 13–17)
HGB BLD-MCNC: 8.7 G/DL — LOW (ref 13–17)
MCHC RBC-ENTMCNC: 32.5 PG — SIGNIFICANT CHANGE UP (ref 27–34)
MCHC RBC-ENTMCNC: 33 GM/DL — SIGNIFICANT CHANGE UP (ref 32–36)
MCV RBC AUTO: 98.6 FL — SIGNIFICANT CHANGE UP (ref 80–100)
NRBC # BLD: 0 /100 WBCS — SIGNIFICANT CHANGE UP (ref 0–0)
PLATELET # BLD AUTO: 135 K/UL — LOW (ref 150–400)
RBC # BLD: 2.12 M/UL — LOW (ref 4.2–5.8)
RBC # FLD: 12.8 % — SIGNIFICANT CHANGE UP (ref 10.3–14.5)
WBC # BLD: 5.53 K/UL — SIGNIFICANT CHANGE UP (ref 3.8–10.5)
WBC # FLD AUTO: 5.53 K/UL — SIGNIFICANT CHANGE UP (ref 3.8–10.5)

## 2018-07-18 PROCEDURE — 99231 SBSQ HOSP IP/OBS SF/LOW 25: CPT

## 2018-07-18 RX ORDER — SENNA PLUS 8.6 MG/1
2 TABLET ORAL AT BEDTIME
Qty: 0 | Refills: 0 | Status: DISCONTINUED | OUTPATIENT
Start: 2018-07-18 | End: 2018-07-24

## 2018-07-18 RX ORDER — ATORVASTATIN CALCIUM 80 MG/1
20 TABLET, FILM COATED ORAL AT BEDTIME
Qty: 0 | Refills: 0 | Status: DISCONTINUED | OUTPATIENT
Start: 2018-07-18 | End: 2018-07-24

## 2018-07-18 RX ORDER — DOCUSATE SODIUM 100 MG
100 CAPSULE ORAL THREE TIMES A DAY
Qty: 0 | Refills: 0 | Status: DISCONTINUED | OUTPATIENT
Start: 2018-07-18 | End: 2018-07-24

## 2018-07-18 RX ADMIN — VALACYCLOVIR 500 MILLIGRAM(S): 500 TABLET, FILM COATED ORAL at 18:10

## 2018-07-18 RX ADMIN — ATORVASTATIN CALCIUM 20 MILLIGRAM(S): 80 TABLET, FILM COATED ORAL at 22:09

## 2018-07-18 RX ADMIN — Medication 100 MILLIGRAM(S): at 13:02

## 2018-07-18 RX ADMIN — Medication 1 APPLICATION(S): at 06:10

## 2018-07-18 RX ADMIN — LORATADINE 10 MILLIGRAM(S): 10 TABLET ORAL at 11:18

## 2018-07-18 RX ADMIN — SENNA PLUS 2 TABLET(S): 8.6 TABLET ORAL at 18:11

## 2018-07-18 RX ADMIN — Medication 100 MILLIGRAM(S): at 06:10

## 2018-07-18 RX ADMIN — DEXTROSE MONOHYDRATE, SODIUM CHLORIDE, AND POTASSIUM CHLORIDE 125 MILLILITER(S): 50; .745; 4.5 INJECTION, SOLUTION INTRAVENOUS at 18:16

## 2018-07-18 RX ADMIN — Medication 1 APPLICATION(S): at 18:10

## 2018-07-18 RX ADMIN — Medication 100 MILLIGRAM(S): at 22:08

## 2018-07-18 NOTE — CHART NOTE - NSCHARTNOTEFT_GEN_A_CORE
Upon Nutritional Assessment by the Registered Dietitian your patient was determined to meet criteria / has evidence of the following diagnosis/diagnoses:          [ ]  Mild Protein Calorie Malnutrition        [ ]  Moderate Protein Calorie Malnutrition        [x] Severe Protein Calorie Malnutrition        [ ] Unspecified Protein Calorie Malnutrition        [ ] Underweight / BMI <19        [ ] Morbid Obesity / BMI > 40      Findings:  pt appears thin with severe temporal and moderate clavicle muscle wasting.  severe orbital fat wasting.  Pt reports either running or riding his bike every day (biking 20-30 miles/day or running 4-5 miles/day).  Pt also only eats two meals per day, meeting <75% of ENN with high exercise routine. wt stable per pt. Pt with moderate Lt hip/thigh edema.  Based on above, pt meets criteria for severe malnutrition.  d/w pt importance of adequate calorie/protein intake; pt not wanting oral supplements, will have his wife bring in protein bars to aid in increasing calorie/protein intake.  encouraged increased meals, to aid with meeting increased nutr needs.    Findings as based on:  •  Comprehensive nutrition assessment and consultation  •  Calorie counts (nutrient intake analysis)  •  Food acceptance and intake status from observations by staff  •  Follow up  •  Patient education  •  Intervention secondary to interdisciplinary rounds  •   concerns      Treatment:    The following diet has been recommended:   1) encourage PO intake at each meal with snacks between meals   2) add MVI with minerals daily to ensure 100% RDI met   3) biweekly wt checks    PROVIDER Section:     By signing this assessment you are acknowledging and agree with the diagnosis/diagnoses assigned by the Registered Dietitian    Comments:

## 2018-07-18 NOTE — PROGRESS NOTE ADULT - SUBJECTIVE AND OBJECTIVE BOX
S: Feeling a little better. Not lightheaded. Sitting up in chair. Denies Paresthesias. No Calf tenderness.  O:  Vital Signs Last 24 Hrs  T(C): 37.5 (18 Jul 2018 05:52), Max: 37.5 (18 Jul 2018 05:52)  T(F): 99.5 (18 Jul 2018 05:52), Max: 99.5 (18 Jul 2018 05:52)  HR: 74 (18 Jul 2018 05:52) (74 - 81)  BP: 107/56 (18 Jul 2018 05:52) (107/56 - 112/48)  BP(mean): --  RR: 18 (18 Jul 2018 05:52) (18 - 18)  SpO2: 96% (18 Jul 2018 05:52) (96% - 97%)                        6.9    5.53  )-----------( 135      ( 18 Jul 2018 05:51 )             20.9     Appears Comfortable, NAD, AAOx3  LLE:  Thigh remains swollen but is not increased, remains soft. Flank is soft. Ecchymosis noted. Not increased.  +EHL FHL TA GS  SILT 1-5 toes  Calf soft no TTP  A/P:  LEft LC1 pelvic fx  Transfusion per Surgery, anemia likely due to pelvic fxs  large ice bag applied  TTWB, OOB PT  DVT PE ppx: SCDs  Discussed with Dr. Gaviria
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
## 2018-07-18 NOTE — CHART NOTE - NSCHARTNOTEFT_GEN_A_CORE
Patient's Hemoglobin and Hematocrit = 6.9/ 20.9 this am.    D/W Dr. Sewell    As per Dr. Sewell, transfuse patient 1 unit PRBC's
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
## 2018-07-19 LAB
ANION GAP SERPL CALC-SCNC: 7 MMOL/L — SIGNIFICANT CHANGE UP (ref 5–17)
BUN SERPL-MCNC: 10 MG/DL — SIGNIFICANT CHANGE UP (ref 7–23)
CALCIUM SERPL-MCNC: 8.4 MG/DL — LOW (ref 8.5–10.1)
CHLORIDE SERPL-SCNC: 102 MMOL/L — SIGNIFICANT CHANGE UP (ref 96–108)
CO2 SERPL-SCNC: 29 MMOL/L — SIGNIFICANT CHANGE UP (ref 22–31)
CREAT SERPL-MCNC: 0.73 MG/DL — SIGNIFICANT CHANGE UP (ref 0.5–1.3)
GLUCOSE SERPL-MCNC: 105 MG/DL — HIGH (ref 70–99)
HCT VFR BLD CALC: 23.6 % — LOW (ref 39–50)
HGB BLD-MCNC: 7.9 G/DL — LOW (ref 13–17)
MCHC RBC-ENTMCNC: 31.7 PG — SIGNIFICANT CHANGE UP (ref 27–34)
MCHC RBC-ENTMCNC: 33.5 GM/DL — SIGNIFICANT CHANGE UP (ref 32–36)
MCV RBC AUTO: 94.8 FL — SIGNIFICANT CHANGE UP (ref 80–100)
NRBC # BLD: 0 /100 WBCS — SIGNIFICANT CHANGE UP (ref 0–0)
PLATELET # BLD AUTO: 161 K/UL — SIGNIFICANT CHANGE UP (ref 150–400)
POTASSIUM SERPL-MCNC: 4.2 MMOL/L — SIGNIFICANT CHANGE UP (ref 3.5–5.3)
POTASSIUM SERPL-SCNC: 4.2 MMOL/L — SIGNIFICANT CHANGE UP (ref 3.5–5.3)
RBC # BLD: 2.49 M/UL — LOW (ref 4.2–5.8)
RBC # FLD: 13.4 % — SIGNIFICANT CHANGE UP (ref 10.3–14.5)
SODIUM SERPL-SCNC: 138 MMOL/L — SIGNIFICANT CHANGE UP (ref 135–145)
WBC # BLD: 5.32 K/UL — SIGNIFICANT CHANGE UP (ref 3.8–10.5)
WBC # FLD AUTO: 5.32 K/UL — SIGNIFICANT CHANGE UP (ref 3.8–10.5)

## 2018-07-19 RX ORDER — MULTIVIT-MIN/FERROUS GLUCONATE 9 MG/15 ML
1 LIQUID (ML) ORAL DAILY
Qty: 0 | Refills: 0 | Status: DISCONTINUED | OUTPATIENT
Start: 2018-07-19 | End: 2018-07-24

## 2018-07-19 RX ADMIN — DEXTROSE MONOHYDRATE, SODIUM CHLORIDE, AND POTASSIUM CHLORIDE 50 MILLILITER(S): 50; .745; 4.5 INJECTION, SOLUTION INTRAVENOUS at 02:22

## 2018-07-19 RX ADMIN — Medication 1 APPLICATION(S): at 18:53

## 2018-07-19 RX ADMIN — Medication 1 APPLICATION(S): at 05:38

## 2018-07-19 RX ADMIN — DEXTROSE MONOHYDRATE, SODIUM CHLORIDE, AND POTASSIUM CHLORIDE 50 MILLILITER(S): 50; .745; 4.5 INJECTION, SOLUTION INTRAVENOUS at 13:14

## 2018-07-19 RX ADMIN — VALACYCLOVIR 500 MILLIGRAM(S): 500 TABLET, FILM COATED ORAL at 18:53

## 2018-07-19 RX ADMIN — SENNA PLUS 2 TABLET(S): 8.6 TABLET ORAL at 21:42

## 2018-07-19 RX ADMIN — ATORVASTATIN CALCIUM 20 MILLIGRAM(S): 80 TABLET, FILM COATED ORAL at 21:42

## 2018-07-19 RX ADMIN — LORATADINE 10 MILLIGRAM(S): 10 TABLET ORAL at 13:08

## 2018-07-19 RX ADMIN — Medication 100 MILLIGRAM(S): at 05:38
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
## 2018-07-19 NOTE — PROGRESS NOTE ADULT - SUBJECTIVE AND OBJECTIVE BOX
Patient seen and examined at bedside. Reports no acute events overnight. Pain well controlled. States swelling in leg has decreased.    PHYSICAL EXAM:  Vital Signs Last 24 Hrs  T(C): 36.9 (19 Jul 2018 04:45), Max: 37.8 (18 Jul 2018 20:14)  T(F): 98.4 (19 Jul 2018 04:45), Max: 100.1 (18 Jul 2018 20:14)  HR: 74 (19 Jul 2018 04:45) (74 - 98)  BP: 114/54 (19 Jul 2018 04:45) (107/45 - 124/58)  RR: 18 (19 Jul 2018 04:45) (16 - 18)  SpO2: 97% (19 Jul 2018 04:45) (97% - 100%)    Gen: NAD; Resting comfortably  LLE: Compressive dressing c/d/i  Skin intact; No erythema  Diffuse swelling of thigh  SILT L3-S1  +EHL/FHL/TA/GSC  +DP  Compartments soft and compressible  No calf tenderness

## 2018-07-19 NOTE — PROGRESS NOTE ADULT - SUBJECTIVE AND OBJECTIVE BOX
BERTA SUN67y      acetaminophen   Tablet. 650 milliGRAM(s) Oral every 6 hours PRN  atorvastatin 20 milliGRAM(s) Oral at bedtime  BACItracin   Ointment 1 Application(s) Topical two times a day  dextrose 5% + sodium chloride 0.45% with potassium chloride 20 mEq/L 1000 milliLiter(s) IV Continuous <Continuous>  docusate sodium 100 milliGRAM(s) Oral three times a day  loratadine 10 milliGRAM(s) Oral daily  ondansetron Injectable 4 milliGRAM(s) IV Push every 6 hours PRN  oxyCODONE    IR 5 milliGRAM(s) Oral every 4 hours PRN  oxyCODONE    IR 10 milliGRAM(s) Oral every 4 hours PRN  senna 2 Tablet(s) Oral at bedtime  valACYclovir 500 milliGRAM(s) Oral daily        Physical Exam  T(C): 37.3 (07-19-18 @ 13:37), Max: 37.3 (07-18-18 @ 23:54)  HR: 77 (07-19-18 @ 13:37) (74 - 77)  BP: 119/59 (07-19-18 @ 13:37) (114/54 - 119/59)  RR: 18 (07-19-18 @ 13:37) (18 - 18)  SpO2: 95% (07-19-18 @ 13:37) (95% - 97%)  Wt(kg): --  Constitutional Feels a little better    Lungs-clear, slight wheeze left side    Cor-RRR    Abd-  + BS soft non tender    Ext-no edema

## 2018-07-20 LAB
HCT VFR BLD CALC: 23.9 % — LOW (ref 39–50)
HGB BLD-MCNC: 8 G/DL — LOW (ref 13–17)
MCHC RBC-ENTMCNC: 31.7 PG — SIGNIFICANT CHANGE UP (ref 27–34)
MCHC RBC-ENTMCNC: 33.5 GM/DL — SIGNIFICANT CHANGE UP (ref 32–36)
MCV RBC AUTO: 94.8 FL — SIGNIFICANT CHANGE UP (ref 80–100)
NRBC # BLD: 0 /100 WBCS — SIGNIFICANT CHANGE UP (ref 0–0)
PLATELET # BLD AUTO: 184 K/UL — SIGNIFICANT CHANGE UP (ref 150–400)
RBC # BLD: 2.52 M/UL — LOW (ref 4.2–5.8)
RBC # FLD: 13.1 % — SIGNIFICANT CHANGE UP (ref 10.3–14.5)
WBC # BLD: 4.87 K/UL — SIGNIFICANT CHANGE UP (ref 3.8–10.5)
WBC # FLD AUTO: 4.87 K/UL — SIGNIFICANT CHANGE UP (ref 3.8–10.5)

## 2018-07-20 PROCEDURE — 71045 X-RAY EXAM CHEST 1 VIEW: CPT | Mod: 26

## 2018-07-20 RX ORDER — HYDROCORTISONE 1 %
1 OINTMENT (GRAM) TOPICAL EVERY 6 HOURS
Qty: 0 | Refills: 0 | Status: DISCONTINUED | OUTPATIENT
Start: 2018-07-20 | End: 2018-07-24

## 2018-07-20 RX ORDER — SODIUM CHLORIDE 9 MG/ML
1000 INJECTION INTRAMUSCULAR; INTRAVENOUS; SUBCUTANEOUS
Qty: 0 | Refills: 0 | Status: DISCONTINUED | OUTPATIENT
Start: 2018-07-20 | End: 2018-07-24

## 2018-07-20 RX ORDER — DIPHENHYDRAMINE HCL 50 MG
50 CAPSULE ORAL EVERY 6 HOURS
Qty: 0 | Refills: 0 | Status: DISCONTINUED | OUTPATIENT
Start: 2018-07-20 | End: 2018-07-21

## 2018-07-20 RX ADMIN — SODIUM CHLORIDE 50 MILLILITER(S): 9 INJECTION INTRAMUSCULAR; INTRAVENOUS; SUBCUTANEOUS at 09:02

## 2018-07-20 RX ADMIN — Medication 1 TABLET(S): at 11:34

## 2018-07-20 RX ADMIN — Medication 1 APPLICATION(S): at 05:46

## 2018-07-20 RX ADMIN — Medication 1 APPLICATION(S): at 19:00

## 2018-07-20 RX ADMIN — LORATADINE 10 MILLIGRAM(S): 10 TABLET ORAL at 11:34

## 2018-07-20 RX ADMIN — VALACYCLOVIR 500 MILLIGRAM(S): 500 TABLET, FILM COATED ORAL at 17:50

## 2018-07-20 RX ADMIN — Medication 1 APPLICATION(S): at 17:50

## 2018-07-20 RX ADMIN — ATORVASTATIN CALCIUM 20 MILLIGRAM(S): 80 TABLET, FILM COATED ORAL at 21:49

## 2018-07-20 RX ADMIN — Medication 50 MILLIGRAM(S): at 21:50
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
## 2018-07-21 LAB
HCT VFR BLD CALC: 23.8 % — LOW (ref 39–50)
HGB BLD-MCNC: 7.8 G/DL — LOW (ref 13–17)
MCHC RBC-ENTMCNC: 31.8 PG — SIGNIFICANT CHANGE UP (ref 27–34)
MCHC RBC-ENTMCNC: 32.8 GM/DL — SIGNIFICANT CHANGE UP (ref 32–36)
MCV RBC AUTO: 97.1 FL — SIGNIFICANT CHANGE UP (ref 80–100)
NRBC # BLD: 0 /100 WBCS — SIGNIFICANT CHANGE UP (ref 0–0)
PLATELET # BLD AUTO: 248 K/UL — SIGNIFICANT CHANGE UP (ref 150–400)
RBC # BLD: 2.45 M/UL — LOW (ref 4.2–5.8)
RBC # FLD: 13 % — SIGNIFICANT CHANGE UP (ref 10.3–14.5)
WBC # BLD: 6.37 K/UL — SIGNIFICANT CHANGE UP (ref 3.8–10.5)
WBC # FLD AUTO: 6.37 K/UL — SIGNIFICANT CHANGE UP (ref 3.8–10.5)

## 2018-07-21 PROCEDURE — 99231 SBSQ HOSP IP/OBS SF/LOW 25: CPT

## 2018-07-21 RX ORDER — HYDROXYZINE HCL 10 MG
50 TABLET ORAL EVERY 6 HOURS
Qty: 0 | Refills: 0 | Status: DISCONTINUED | OUTPATIENT
Start: 2018-07-21 | End: 2018-07-24

## 2018-07-21 RX ORDER — IBUPROFEN 200 MG
600 TABLET ORAL EVERY 8 HOURS
Qty: 0 | Refills: 0 | Status: DISCONTINUED | OUTPATIENT
Start: 2018-07-21 | End: 2018-07-24

## 2018-07-21 RX ORDER — FAMOTIDINE 10 MG/ML
20 INJECTION INTRAVENOUS DAILY
Qty: 0 | Refills: 0 | Status: DISCONTINUED | OUTPATIENT
Start: 2018-07-21 | End: 2018-07-24

## 2018-07-21 RX ADMIN — Medication 50 MILLIGRAM(S): at 21:10

## 2018-07-21 RX ADMIN — SODIUM CHLORIDE 50 MILLILITER(S): 9 INJECTION INTRAMUSCULAR; INTRAVENOUS; SUBCUTANEOUS at 23:03

## 2018-07-21 RX ADMIN — Medication 50 MILLIGRAM(S): at 19:20

## 2018-07-21 RX ADMIN — FAMOTIDINE 20 MILLIGRAM(S): 10 INJECTION INTRAVENOUS at 21:10

## 2018-07-21 RX ADMIN — Medication 1 APPLICATION(S): at 23:04

## 2018-07-21 RX ADMIN — ATORVASTATIN CALCIUM 20 MILLIGRAM(S): 80 TABLET, FILM COATED ORAL at 21:10

## 2018-07-21 RX ADMIN — SODIUM CHLORIDE 50 MILLILITER(S): 9 INJECTION INTRAMUSCULAR; INTRAVENOUS; SUBCUTANEOUS at 11:36

## 2018-07-21 RX ADMIN — Medication 650 MILLIGRAM(S): at 19:20

## 2018-07-21 RX ADMIN — Medication 1 APPLICATION(S): at 05:52

## 2018-07-21 RX ADMIN — LORATADINE 10 MILLIGRAM(S): 10 TABLET ORAL at 11:33

## 2018-07-21 RX ADMIN — VALACYCLOVIR 500 MILLIGRAM(S): 500 TABLET, FILM COATED ORAL at 17:39

## 2018-07-21 RX ADMIN — Medication 1 TABLET(S): at 11:33

## 2018-07-21 RX ADMIN — OXYCODONE HYDROCHLORIDE 5 MILLIGRAM(S): 5 TABLET ORAL at 01:55

## 2018-07-21 RX ADMIN — Medication 1 APPLICATION(S): at 17:39

## 2018-07-21 RX ADMIN — Medication 650 MILLIGRAM(S): at 07:53

## 2018-07-21 RX ADMIN — Medication 1 APPLICATION(S): at 11:35

## 2018-07-21 NOTE — PROGRESS NOTE ADULT - ASSESSMENT
S/p pubic ramus, pelvic fracture and left thigh contussion/hematoma. Pain management. PT. Follow labs. Encourage ambulation.

## 2018-07-21 NOTE — PROGRESS NOTE ADULT - SUBJECTIVE AND OBJECTIVE BOX
Asked by RN to evaluate patient for a rash that is worsening. Patient seen and examined at bedside. As per nurse and patient, rash began yesterday along patient's trunk region.  As per patient now spreading to extremities - not relieved by benadryl. Topical hydrocortisone with some relief. On Exam macular/papular rash most significant on back and abdomen with mild rash on extremities. Discussed with Dr. Sewell and pharmacy. Will d/c benadryl and try atarax and pepcid. Continue topical hydrocortisone. Oxycodone d/c'd.  Hospitalist consult ordered.

## 2018-07-21 NOTE — PROGRESS NOTE ADULT - SUBJECTIVE AND OBJECTIVE BOX
BERTA SUN67y      acetaminophen   Tablet. 650 milliGRAM(s) Oral every 6 hours PRN  atorvastatin 20 milliGRAM(s) Oral at bedtime  BACItracin   Ointment 1 Application(s) Topical two times a day  diphenhydrAMINE   Capsule 50 milliGRAM(s) Oral every 6 hours PRN  docusate sodium 100 milliGRAM(s) Oral three times a day  hydrocortisone 1% Cream 1 Application(s) Topical every 6 hours PRN  loratadine 10 milliGRAM(s) Oral daily  multivitamin/minerals 1 Tablet(s) Oral daily  ondansetron Injectable 4 milliGRAM(s) IV Push every 6 hours PRN  oxyCODONE    IR 5 milliGRAM(s) Oral every 4 hours PRN  oxyCODONE    IR 10 milliGRAM(s) Oral every 4 hours PRN  senna 2 Tablet(s) Oral at bedtime  sodium chloride 0.9%. 1000 milliLiter(s) IV Continuous <Continuous>  valACYclovir 500 milliGRAM(s) Oral daily        Physical Exam  T(C): 36.8 (07-21-18 @ 13:22), Max: 37.5 (07-20-18 @ 21:29)  HR: 78 (07-21-18 @ 13:22) (75 - 78)  BP: 113/59 (07-21-18 @ 13:22) (111/52 - 119/54)  RR: 18 (07-21-18 @ 13:22) (17 - 18)  SpO2: 100% (07-21-18 @ 13:22) (97% - 100%)  Wt(kg): --  Constitutional Feels well    Lungs-  clear  Cor-  RRR  Abd- soft, non tender      Ext-  left thigh swelling unchanged. No tenderness. Distal pulses intact

## 2018-07-22 DIAGNOSIS — S01.81XA LACERATION WITHOUT FOREIGN BODY OF OTHER PART OF HEAD, INITIAL ENCOUNTER: ICD-10-CM

## 2018-07-22 LAB
BLD GP AB SCN SERPL QL: SIGNIFICANT CHANGE UP
HCT VFR BLD CALC: 24.5 % — LOW (ref 39–50)
HGB BLD-MCNC: 8.2 G/DL — LOW (ref 13–17)
MCHC RBC-ENTMCNC: 32.4 PG — SIGNIFICANT CHANGE UP (ref 27–34)
MCHC RBC-ENTMCNC: 33.5 GM/DL — SIGNIFICANT CHANGE UP (ref 32–36)
MCV RBC AUTO: 96.8 FL — SIGNIFICANT CHANGE UP (ref 80–100)
NRBC # BLD: 0 /100 WBCS — SIGNIFICANT CHANGE UP (ref 0–0)
PLATELET # BLD AUTO: 283 K/UL — SIGNIFICANT CHANGE UP (ref 150–400)
RBC # BLD: 2.53 M/UL — LOW (ref 4.2–5.8)
RBC # FLD: 12.9 % — SIGNIFICANT CHANGE UP (ref 10.3–14.5)
TYPE + AB SCN PNL BLD: SIGNIFICANT CHANGE UP
WBC # BLD: 5.6 K/UL — SIGNIFICANT CHANGE UP (ref 3.8–10.5)
WBC # FLD AUTO: 5.6 K/UL — SIGNIFICANT CHANGE UP (ref 3.8–10.5)

## 2018-07-22 PROCEDURE — 86078 PHYS BLOOD BANK SERV REACTJ: CPT

## 2018-07-22 RX ORDER — LANOLIN ALCOHOL/MO/W.PET/CERES
5 CREAM (GRAM) TOPICAL ONCE
Qty: 0 | Refills: 0 | Status: COMPLETED | OUTPATIENT
Start: 2018-07-22 | End: 2018-07-22

## 2018-07-22 RX ADMIN — Medication 1 APPLICATION(S): at 06:03

## 2018-07-22 RX ADMIN — Medication 1 APPLICATION(S): at 08:43

## 2018-07-22 RX ADMIN — Medication 50 MILLIGRAM(S): at 08:39

## 2018-07-22 RX ADMIN — SODIUM CHLORIDE 50 MILLILITER(S): 9 INJECTION INTRAMUSCULAR; INTRAVENOUS; SUBCUTANEOUS at 19:07

## 2018-07-22 RX ADMIN — LORATADINE 10 MILLIGRAM(S): 10 TABLET ORAL at 11:46

## 2018-07-22 RX ADMIN — VALACYCLOVIR 500 MILLIGRAM(S): 500 TABLET, FILM COATED ORAL at 17:05

## 2018-07-22 RX ADMIN — Medication 50 MILLIGRAM(S): at 21:20

## 2018-07-22 RX ADMIN — Medication 1 APPLICATION(S): at 17:04

## 2018-07-22 RX ADMIN — ATORVASTATIN CALCIUM 20 MILLIGRAM(S): 80 TABLET, FILM COATED ORAL at 21:03

## 2018-07-22 RX ADMIN — Medication 1 TABLET(S): at 11:46

## 2018-07-22 RX ADMIN — FAMOTIDINE 20 MILLIGRAM(S): 10 INJECTION INTRAVENOUS at 11:46

## 2018-07-22 NOTE — PROGRESS NOTE ADULT - ASSESSMENT
S/p pubic ramus, pelvic fracture and left thigh contussion/hematoma. Pain management. PT. Follow labs. Encourage ambulation.DC planning

## 2018-07-22 NOTE — PROVIDER CONTACT NOTE (OTHER) - RECOMMENDATIONS
Duglas Mendenhall from blood bank suggest type and screen with am labs to see if pt developed antibodies.
tylenol

## 2018-07-22 NOTE — PROVIDER CONTACT NOTE (OTHER) - ACTION/TREATMENT ORDERED:
surgical pa is looking into pts chart and going to come assess the pt no orders besides bmp at this time will continue to monitor
Surgical SHERRILL Lala will discuss with Dr. Sewell.  No further orders at this time.
Surgical PA Erica notified, came to assess pt and ordered Atarax and Pepcid. pt linen also changed to Ivory linen. hydrocortisone cream also applied. Blood bank Nicole Mendenhall also notified.
pt with slightly elevated LFT's, will give 30mg toradol x1 per PA

## 2018-07-22 NOTE — PROVIDER CONTACT NOTE (OTHER) - DATE AND TIME:
14-Jul-2018 22:01
15-Jul-2018 00:15
15-Jul-2018 11:04
16-Jul-2018 08:51
17-Jul-2018 07:26
21-Jul-2018 20:00
22-Jul-2018 08:41
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
## 2018-07-22 NOTE — PROGRESS NOTE ADULT - SUBJECTIVE AND OBJECTIVE BOX
BERTA SUN67y      acetaminophen   Tablet. 650 milliGRAM(s) Oral every 6 hours PRN  atorvastatin 20 milliGRAM(s) Oral at bedtime  BACItracin   Ointment 1 Application(s) Topical two times a day  docusate sodium 100 milliGRAM(s) Oral three times a day  famotidine    Tablet 20 milliGRAM(s) Oral daily  hydrocortisone 1% Cream 1 Application(s) Topical every 6 hours PRN  hydrOXYzine hydrochloride 50 milliGRAM(s) Oral every 6 hours PRN  ibuprofen  Tablet 600 milliGRAM(s) Oral every 8 hours PRN  loratadine 10 milliGRAM(s) Oral daily  multivitamin/minerals 1 Tablet(s) Oral daily  ondansetron Injectable 4 milliGRAM(s) IV Push every 6 hours PRN  senna 2 Tablet(s) Oral at bedtime  sodium chloride 0.9%. 1000 milliLiter(s) IV Continuous <Continuous>  valACYclovir 500 milliGRAM(s) Oral daily        Physical Exam  T(C): 37.4 (07-22-18 @ 20:30), Max: 37.4 (07-22-18 @ 20:30)  HR: 82 (07-22-18 @ 20:30) (72 - 82)  BP: 111/67 (07-22-18 @ 20:30) (98/64 - 111/67)  RR: 18 (07-22-18 @ 20:30) (17 - 18)  SpO2: 98% (07-22-18 @ 20:30) (94% - 98%)  Wt(kg): --  Constitutional  Feels well  Lungs-clear    Cor-RRR    Abd-+BS soft nontender      Ext-no edema

## 2018-07-22 NOTE — PROVIDER CONTACT NOTE (OTHER) - ASSESSMENT
After receiving pt at change of shift, Pt wife reported pt rash worsening after taking benadryl, pt assessed and noted to have rash all over his body, more intense on his trunk, pt itching all over. VSS, no resp distress noted. denies any pain. not on antibiotics.  pt also reported having rec'd blood transfusion on 7/18 and will like to know if is a reaction from the transfusion.
no s/sx of distress, other VS stable

## 2018-07-22 NOTE — PROGRESS NOTE ADULT - ASSESSMENT
right brow/upper eyelid laceration. Wound healing very well. Sutures removed without difficulty. No cellulutic changes. We approximated. 1/4" steri strip placed x 2.

## 2018-07-22 NOTE — PROVIDER CONTACT NOTE (OTHER) - SITUATION
PT WAS SEEN BY DR. DAS
AM labs resulted with H/H 7.3/21.5.  Trending down since admission.
Message left with Dr. Sumner answering service
pt admitted from ER to 1N, presenting with temp of 101.2 orally
spoke with Dr. Mgcuire regarding new hospitalist consult via admit phone
testicles are bruised, left thigh is tender and firm, ns is still at 125 an hour, hgb dropped, plt level dropped
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
## 2018-07-23 ENCOUNTER — TRANSCRIPTION ENCOUNTER (OUTPATIENT)
Age: 68
End: 2018-07-23

## 2018-07-23 LAB
ANION GAP SERPL CALC-SCNC: 9 MMOL/L — SIGNIFICANT CHANGE UP (ref 5–17)
BUN SERPL-MCNC: 21 MG/DL — SIGNIFICANT CHANGE UP (ref 7–23)
CALCIUM SERPL-MCNC: 8.6 MG/DL — SIGNIFICANT CHANGE UP (ref 8.5–10.1)
CHLORIDE SERPL-SCNC: 104 MMOL/L — SIGNIFICANT CHANGE UP (ref 96–108)
CO2 SERPL-SCNC: 27 MMOL/L — SIGNIFICANT CHANGE UP (ref 22–31)
CREAT SERPL-MCNC: 0.8 MG/DL — SIGNIFICANT CHANGE UP (ref 0.5–1.3)
GLUCOSE SERPL-MCNC: 94 MG/DL — SIGNIFICANT CHANGE UP (ref 70–99)
HCT VFR BLD CALC: 25 % — LOW (ref 39–50)
HGB BLD-MCNC: 8.3 G/DL — LOW (ref 13–17)
MCHC RBC-ENTMCNC: 32.2 PG — SIGNIFICANT CHANGE UP (ref 27–34)
MCHC RBC-ENTMCNC: 33.2 GM/DL — SIGNIFICANT CHANGE UP (ref 32–36)
MCV RBC AUTO: 96.9 FL — SIGNIFICANT CHANGE UP (ref 80–100)
NRBC # BLD: 0 /100 WBCS — SIGNIFICANT CHANGE UP (ref 0–0)
PLATELET # BLD AUTO: 339 K/UL — SIGNIFICANT CHANGE UP (ref 150–400)
POST UNIT NUMBER: SIGNIFICANT CHANGE UP
POTASSIUM SERPL-MCNC: 4.3 MMOL/L — SIGNIFICANT CHANGE UP (ref 3.5–5.3)
POTASSIUM SERPL-SCNC: 4.3 MMOL/L — SIGNIFICANT CHANGE UP (ref 3.5–5.3)
RBC # BLD: 2.58 M/UL — LOW (ref 4.2–5.8)
RBC # FLD: 13 % — SIGNIFICANT CHANGE UP (ref 10.3–14.5)
SODIUM SERPL-SCNC: 140 MMOL/L — SIGNIFICANT CHANGE UP (ref 135–145)
TRANSFUSION REACTION PATHOLOGIST COMMENTS: SIGNIFICANT CHANGE UP
TRANSFUSION REACTION PATHOLOGIST INTERPRETATION: SIGNIFICANT CHANGE UP
WBC # BLD: 5.87 K/UL — SIGNIFICANT CHANGE UP (ref 3.8–10.5)
WBC # FLD AUTO: 5.87 K/UL — SIGNIFICANT CHANGE UP (ref 3.8–10.5)

## 2018-07-23 RX ORDER — ACYCLOVIR SODIUM 500 MG
500 VIAL (EA) INTRAVENOUS
Qty: 0 | Refills: 0 | COMMUNITY

## 2018-07-23 RX ADMIN — Medication 650 MILLIGRAM(S): at 21:44

## 2018-07-23 RX ADMIN — Medication 1 APPLICATION(S): at 17:35

## 2018-07-23 RX ADMIN — Medication 650 MILLIGRAM(S): at 12:37

## 2018-07-23 RX ADMIN — Medication 650 MILLIGRAM(S): at 11:37

## 2018-07-23 RX ADMIN — Medication 1 TABLET(S): at 11:35

## 2018-07-23 RX ADMIN — Medication 50 MILLIGRAM(S): at 21:44

## 2018-07-23 RX ADMIN — LORATADINE 10 MILLIGRAM(S): 10 TABLET ORAL at 11:35

## 2018-07-23 RX ADMIN — ATORVASTATIN CALCIUM 20 MILLIGRAM(S): 80 TABLET, FILM COATED ORAL at 21:44

## 2018-07-23 RX ADMIN — VALACYCLOVIR 500 MILLIGRAM(S): 500 TABLET, FILM COATED ORAL at 17:36

## 2018-07-23 RX ADMIN — Medication 1 APPLICATION(S): at 06:09

## 2018-07-23 RX ADMIN — FAMOTIDINE 20 MILLIGRAM(S): 10 INJECTION INTRAVENOUS at 11:35

## 2018-07-23 NOTE — DISCHARGE NOTE ADULT - MEDICATION SUMMARY - MEDICATIONS TO TAKE
I will START or STAY ON the medications listed below when I get home from the hospital:    Calcium 600+D3 800 units  -- 1   once a day, am  -- Indication: For Pelvic fracture    atovastatin  -- 20 milligram(s) by mouth once a day  -- Indication: For Hyperlipidemia, unspecified hyperlipidemia type    aspirin 81 mg oral tablet  -- 1 tab(s) by mouth once a day  -- Indication: For Hyperlipidemia, unspecified hyperlipidemia type    oxyCODONE 5 mg oral tablet  -- 2 tab(s) by mouth every 4 hours, As Needed for severe pain. Can take 1 tab for moderate pain. MDD:12  -- Indication: For Pelvic fracture    acetaminophen 325 mg oral tablet  -- 2 tab(s) by mouth every 6 hours, As needed, Mild Pain (1 - 3)  -- Indication: For Pelvic fracture    Allegra 180 mg oral tablet  -- 1 tab(s) by mouth once a day, am  -- Indication: For Pelvic fracture    simvastatin 20 mg oral tablet  -- 1 tab(s) by mouth once a day (at bedtime)  -- Indication: For Hyperlipidemia, unspecified hyperlipidemia type    valACYclovir 500 mg oral tablet  -- 1 tab(s) by mouth once a day in am  -- Indication: For Herpes    senna oral tablet  -- 2 tab(s) by mouth once a day (at bedtime)  -- Indication: For Pelvic fracture    docusate sodium 100 mg oral capsule  -- 1 cap(s) by mouth 3 times a day  -- Indication: For Pelvic fracture    multivitamin with minerals  -- 1 tab(s)  once a day, am.   -- Indication: For Pelvic fracture

## 2018-07-23 NOTE — DISCHARGE NOTE ADULT - PATIENT PORTAL LINK FT
You can access the [facility] Portal, offered by [facility], by registering with the following website: [email or web address]

## 2018-07-23 NOTE — DISCHARGE NOTE ADULT - CARE PLAN
Principal Discharge DX:	Pelvic fracture  Goal:	increase activity level  Assessment and plan of treatment:	Rehab
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
## 2018-07-23 NOTE — DISCHARGE NOTE ADULT - CARE PROVIDER_API CALL
Floyd Gaviria (MD), Orthopaedic Surgery  [address] [place] [address]  Phone: [phone]  Fax: [phone]

## 2018-07-24 VITALS
SYSTOLIC BLOOD PRESSURE: 123 MMHG | HEART RATE: 66 BPM | RESPIRATION RATE: 18 BRPM | DIASTOLIC BLOOD PRESSURE: 55 MMHG | TEMPERATURE: 98 F | OXYGEN SATURATION: 97 %

## 2018-07-24 RX ADMIN — Medication 650 MILLIGRAM(S): at 00:11

## 2018-07-24 RX ADMIN — Medication 1 APPLICATION(S): at 06:24

## 2018-07-24 RX ADMIN — Medication 1 TABLET(S): at 12:41

## 2018-07-31 NOTE — CDI QUERY NOTE - NSCDIOTHERTXTBX2_GEN_ALL_CORE_FT
Please clarify and include the following additional detail:  * The instrument alone will not dictate the classification of excisional debridement.     Type of debridement (excisional vs. non-excisional debridement)        Excisional:   the removal of necrotic devitalized tissue or slough by means of cutting away of tissue, slough, or necrosis.        Non-excisional:  non-operative brushing, irrigation, scrubbing, washing (minor removal of loose fragments).  Site debrided - anatomical location & laterality  Instrument(s) used - e.g. scalpel, knife, versa jet  Depth of tissue debrided such as:    Skin,  Fascia  Subcutaneous  Muscle  Tendon  Bone     A statement referring to “down to the level of ___” does not describe the specificity for coding.

## 2018-07-31 NOTE — CDI QUERY NOTE - NSCDIOTHERTXTBX_GEN_ALL_CORE_HH
Per op report " Periocular area was prepped and draped."............ epinephrine with lidocaine was  injected around the wound for adequate vasoconstriction . " The wound was then  carefully explored  with the use of loop magnification and noted to contain small stone fragments. "The wound was then extensively undermined  with the use of fine micro Luis Manuel scissors as well as 11 blade scalpel. Irregular skin edges were debrided with the use of  #11blade  scalpel and the wound was then approximated in a layered fashion"      Please clarify and include the following additional detail:    Type of debridement (excisional vs. non-excisional debridement)        Excisional:   the removal of necrotic devitalized tissue or slough by means of cutting away of tissue, slough, or necrosis.        Non-excisional:  non-operative brushing, irrigation, scrubbing, washing (minor removal of loose fragments).    Site debrided - anatomical location & laterality    Depth of tissue debrided such as:    Skin,  Fascia  Subcutaneous  Muscle  Tendon  Bone     A statement referring to “down to the level of ___” does not describe the specificity for coding.

## 2018-08-02 ENCOUNTER — OTHER (OUTPATIENT)
Age: 68
End: 2018-08-02

## 2018-08-10 PROBLEM — E78.5 HYPERLIPIDEMIA, UNSPECIFIED: Chronic | Status: ACTIVE | Noted: 2018-07-14

## 2018-08-10 PROBLEM — M51.26 OTHER INTERVERTEBRAL DISC DISPLACEMENT, LUMBAR REGION: Chronic | Status: ACTIVE | Noted: 2017-08-02

## 2018-08-10 PROBLEM — E04.1 NONTOXIC SINGLE THYROID NODULE: Chronic | Status: ACTIVE | Noted: 2017-08-02

## 2018-08-10 PROBLEM — E78.5 HYPERLIPIDEMIA, UNSPECIFIED: Chronic | Status: ACTIVE | Noted: 2017-08-02

## 2018-08-10 PROBLEM — A60.00 HERPESVIRAL INFECTION OF UROGENITAL SYSTEM, UNSPECIFIED: Chronic | Status: ACTIVE | Noted: 2017-08-02

## 2018-08-10 PROBLEM — B00.9 HERPESVIRAL INFECTION, UNSPECIFIED: Chronic | Status: ACTIVE | Noted: 2018-07-14

## 2018-08-10 PROBLEM — J44.9 CHRONIC OBSTRUCTIVE PULMONARY DISEASE, UNSPECIFIED: Chronic | Status: ACTIVE | Noted: 2017-08-02

## 2018-08-14 ENCOUNTER — OUTPATIENT (OUTPATIENT)
Dept: OUTPATIENT SERVICES | Facility: HOSPITAL | Age: 68
LOS: 1 days | Discharge: HOME | End: 2018-08-14

## 2018-08-14 DIAGNOSIS — Z90.2 ACQUIRED ABSENCE OF LUNG [PART OF]: Chronic | ICD-10-CM

## 2018-08-14 DIAGNOSIS — Z98.890 OTHER SPECIFIED POSTPROCEDURAL STATES: Chronic | ICD-10-CM

## 2018-08-14 DIAGNOSIS — Z90.89 ACQUIRED ABSENCE OF OTHER ORGANS: Chronic | ICD-10-CM

## 2018-08-14 DIAGNOSIS — C80.1 MALIGNANT (PRIMARY) NEOPLASM, UNSPECIFIED: ICD-10-CM

## 2018-08-24 ENCOUNTER — APPOINTMENT (OUTPATIENT)
Dept: HEMATOLOGY ONCOLOGY | Facility: CLINIC | Age: 68
End: 2018-08-24
Payer: COMMERCIAL

## 2018-08-24 VITALS
RESPIRATION RATE: 16 BRPM | HEART RATE: 68 BPM | DIASTOLIC BLOOD PRESSURE: 73 MMHG | WEIGHT: 138.89 LBS | TEMPERATURE: 98.3 F | BODY MASS INDEX: 19.93 KG/M2 | SYSTOLIC BLOOD PRESSURE: 123 MMHG | OXYGEN SATURATION: 99 %

## 2018-08-24 PROCEDURE — 99215 OFFICE O/P EST HI 40 MIN: CPT

## 2018-11-06 ENCOUNTER — APPOINTMENT (OUTPATIENT)
Dept: PULMONOLOGY | Facility: CLINIC | Age: 68
End: 2018-11-06

## 2018-11-14 ENCOUNTER — OUTPATIENT (OUTPATIENT)
Dept: OUTPATIENT SERVICES | Facility: HOSPITAL | Age: 68
LOS: 1 days | Discharge: HOME | End: 2018-11-14

## 2018-11-14 DIAGNOSIS — Z98.890 OTHER SPECIFIED POSTPROCEDURAL STATES: Chronic | ICD-10-CM

## 2018-11-14 DIAGNOSIS — Z90.89 ACQUIRED ABSENCE OF OTHER ORGANS: Chronic | ICD-10-CM

## 2018-11-14 DIAGNOSIS — Z90.2 ACQUIRED ABSENCE OF LUNG [PART OF]: Chronic | ICD-10-CM

## 2018-11-14 DIAGNOSIS — C80.1 MALIGNANT (PRIMARY) NEOPLASM, UNSPECIFIED: ICD-10-CM

## 2018-11-16 ENCOUNTER — APPOINTMENT (OUTPATIENT)
Dept: THORACIC SURGERY | Facility: CLINIC | Age: 68
End: 2018-11-16
Payer: COMMERCIAL

## 2018-11-16 VITALS
BODY MASS INDEX: 20.04 KG/M2 | HEIGHT: 70 IN | RESPIRATION RATE: 16 BRPM | DIASTOLIC BLOOD PRESSURE: 70 MMHG | SYSTOLIC BLOOD PRESSURE: 130 MMHG | OXYGEN SATURATION: 98 % | WEIGHT: 140 LBS | HEART RATE: 70 BPM

## 2018-11-16 PROCEDURE — 99213 OFFICE O/P EST LOW 20 MIN: CPT

## 2018-11-20 ENCOUNTER — APPOINTMENT (OUTPATIENT)
Dept: HEMATOLOGY ONCOLOGY | Facility: CLINIC | Age: 68
End: 2018-11-20
Payer: COMMERCIAL

## 2018-11-20 PROCEDURE — 99214 OFFICE O/P EST MOD 30 MIN: CPT

## 2018-11-20 NOTE — CONSULT LETTER
[Dear  ___] : Dear  [unfilled], [Consult Letter:] : I had the pleasure of evaluating your patient, [unfilled]. [Please see my note below.] : Please see my note below. [Consult Closing:] : Thank you very much for allowing me to participate in the care of this patient.  If you have any questions, please do not hesitate to contact me. [Sincerely,] : Sincerely, [FreeTextEntry2] : Dr. Anthony Bull [FreeTextEntry3] : Em [DrClaudia  ___] : Dr. FLORES [DrClaudia ___] : Dr. FLORES

## 2018-11-20 NOTE — PHYSICAL EXAM
[Fully active, able to carry on all pre-disease performance without restriction] : Status 0 - Fully active, able to carry on all pre-disease performance without restriction [Normal] : affect appropriate [de-identified] : VATS incision site clean [de-identified] : slightly indurated vein in left forearm without any redness.

## 2018-11-20 NOTE — ASSESSMENT
[FreeTextEntry1] : 65 yo m with excellent PS recently diagnosed with lung adeno ca, s/p left lower lobectomy and mediastinal LN dissection showing T1cN2 (one subcarinal node) adeno ca- stage IIIA\par Receiving adjuvant chemo with cisplatin and pemetrexed Q 3 weeks in December 2017.\par He as noted to have persistent right sided GGO with some solid components. \par Underwent sublobar resection of the rt LLL nodule 1/2018.\par Scans have been JIE. \par Reassured about findings on last CT from [facility]. \par \par \par \par \par  [Curative] : Goals of care discussed with patient: Curative

## 2018-11-20 NOTE — HISTORY OF PRESENT ILLNESS
[Disease: _____________________] : Disease: [unfilled] [T: ___] : T[unfilled] [N: ___] : N[unfilled] [AJCC Stage: ____] : AJCC Stage: [unfilled] [de-identified] : Mr. Groves is a pleasant 67-year-old man here for a follow up visit to discuss adjuvant systemic tx post-resection of Stage IIIA lung cancer. \par He has completed 3 cycles. was neutropenic after cycle 2 and hence, had Neulasta for cycle 3 which he tolerated well with no cytopenias. He however did have increased fatigue after cycle 3 but has returned to baseline now. He is very active with running, biking. Had CT chest done as part of post-op surveillance which showed slightly increased RUL GGO with increasing solid component. He saw Dr. Trent and underwent surgical resection in January- pT1bN0. No adjuvant was offered. Has been feeling great scans done in April, July and November JIE. Stable 3 mm RUL nodule and splenic cysts noted- reassured pt.  [de-identified] : adeno ca

## 2018-11-27 ENCOUNTER — APPOINTMENT (OUTPATIENT)
Dept: PULMONOLOGY | Facility: CLINIC | Age: 68
End: 2018-11-27
Payer: COMMERCIAL

## 2018-11-27 VITALS
DIASTOLIC BLOOD PRESSURE: 74 MMHG | OXYGEN SATURATION: 100 % | HEART RATE: 60 BPM | SYSTOLIC BLOOD PRESSURE: 134 MMHG | HEIGHT: 70 IN | TEMPERATURE: 98 F | BODY MASS INDEX: 20.76 KG/M2 | WEIGHT: 145 LBS | RESPIRATION RATE: 16 BRPM

## 2018-11-27 PROCEDURE — 99214 OFFICE O/P EST MOD 30 MIN: CPT

## 2019-04-05 NOTE — PROGRESS NOTE ADULT - SUBJECTIVE AND OBJECTIVE BOX
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 Writer spoke with nurse Shara in pul who will be getting this ready for him and will call patient back for further instruction.      Next and Last Visit with Provider and Department  Last visit with this provider: 12/31/2018  Last visit with this department: Visit date not found    Next visit with this provider: Visit date not found  Next visit with this department: Visit date not found    WEIGHT AND HEIGHT:   Wt Readings from Last 1 Encounters:   04/04/19 107 kg (236 lb)     Ht Readings from Last 1 Encounters:   04/04/19 5' 7\" (1.702 m)     BMI Readings from Last 1 Encounters:   04/04/19 36.96 kg/m²       ALLERGIES: no known allergies.  Current Outpatient Medications   Medication   • HYDROcodone-acetaminophen (NORCO) 5-325 MG per tablet   • atorvastatin (LIPITOR) 40 MG tablet   • glipiZIDE (GLUCOTROL) 5 MG tablet   • nitroGLYcerin (NITROSTAT) 0.4 MG sublingual tablet   • blood glucose (ACCU-CHEK SAVANAH PLUS) test strip   • metformin (GLUCOPHAGE-XR) 500 MG 24 hr tablet   • blood glucose (ACCU-CHEK SAVANAH PLUS) test strip   • albuterol 108 (90 Base) MCG/ACT inhaler   • MULTIPLE VITAMIN PO   • metoPROLOL succinate (TOPROL-XL) 25 MG 24 hr tablet   • ramipril (ALTACE) 5 MG capsule   • aspirin 81 MG tablet   • Omega-3 Fatty Acids (FISH OIL) 1000 MG capsule   • ipratropium (ATROVENT) 0.06 % nasal spray   • Vitamin E Powder   • Spacer/Aero-Holding Chambers (AEROCHAMBER) Misc   • tamsulosin (FLOMAX) 0.4 MG Cap     No current facility-administered medications for this visit.      PHARMACY to use: on file          Pharmacy preference(s) on file:   [id number] - [place], [place] - [address] AT [facility] [address]  [facility] [address]  Phone: [phone] Fax:  [phone]      CALL BACK INFO: Ok to leave response (including medical information) with family member or on answering machine  ROUTING: Patient's physician/staff        PCP: Dotty Sr DO         INS: Payor: MEDICARE / Plan: PARTA AND B / Product Type: MEDICARE   PATIENT ADDRESS:  [address] [place] [address] [address]  [facility] [id number]     BERTA SUN67y      acetaminophen   Tablet. 650 milliGRAM(s) Oral every 6 hours PRN  atorvastatin 20 milliGRAM(s) Oral at bedtime  BACItracin   Ointment 1 Application(s) Topical two times a day  dextrose 5% + sodium chloride 0.45% with potassium chloride 20 mEq/L 1000 milliLiter(s) IV Continuous <Continuous>  docusate sodium 100 milliGRAM(s) Oral three times a day  loratadine 10 milliGRAM(s) Oral daily  ondansetron Injectable 4 milliGRAM(s) IV Push every 6 hours PRN  oxyCODONE    IR 5 milliGRAM(s) Oral every 4 hours PRN  oxyCODONE    IR 10 milliGRAM(s) Oral every 4 hours PRN  senna 2 Tablet(s) Oral at bedtime  valACYclovir 500 milliGRAM(s) Oral daily        Physical Exam  T(C): 37.8 (07-18-18 @ 20:14), Max: 37.8 (07-18-18 @ 20:14)  HR: 87 (07-18-18 @ 20:14) (74 - 98)  BP: 124/58 (07-18-18 @ 20:14) (107/45 - 124/58)  RR: 18 (07-18-18 @ 20:14) (16 - 18)  SpO2: 97% (07-18-18 @ 20:14) (96% - 100%)  Wt(kg): --  Constitutional  Complains of pain and weakness left lower extremity  Lungs-  clear  Cor-RRR    Abd-soft non tender      Ext-Swelling left thigh- unchanged

## 2019-04-15 ENCOUNTER — TRANSCRIPTION ENCOUNTER (OUTPATIENT)
Age: 69
End: 2019-04-15

## 2019-04-18 ENCOUNTER — APPOINTMENT (OUTPATIENT)
Dept: THORACIC SURGERY | Facility: CLINIC | Age: 69
End: 2019-04-18
Payer: COMMERCIAL

## 2019-04-18 VITALS
HEART RATE: 65 BPM | OXYGEN SATURATION: 98 % | WEIGHT: 144 LBS | SYSTOLIC BLOOD PRESSURE: 104 MMHG | TEMPERATURE: 97.8 F | HEIGHT: 70 IN | DIASTOLIC BLOOD PRESSURE: 68 MMHG | RESPIRATION RATE: 16 BRPM | BODY MASS INDEX: 20.62 KG/M2

## 2019-04-18 PROCEDURE — 99214 OFFICE O/P EST MOD 30 MIN: CPT

## 2019-04-19 NOTE — CONSULT LETTER
[Dear  ___] : Dear  [unfilled], [Courtesy Letter:] : I had the pleasure of seeing your patient, [unfilled], in my office today. [Please see my note below.] : Please see my note below. [Sincerely,] : Sincerely, [FreeTextEntry2] : Dr. Awad (Onc)\par Dr. Anthony Bull (Pulm)\par Dr. Namrata Fernandez (PCP)\par Dr. Jay Lisker (Cardio) \par  [FreeTextEntry3] : \par Alberto Trent MD, FACS \par Chief, Division of Thoracic Surgery \par Director, Minimally Invasive Thoracic Surgery \par Department of Cardiovascular and Thoracic Surgery \par [facility] \par , Cardiovascular and Thoracic Surgery \par [facility] [employer] [facility] \par

## 2019-04-19 NOTE — HISTORY OF PRESENT ILLNESS
[FreeTextEntry1] : 68 year old male s/p a uniportal Right VATS, Right Upper Lobe Posterior Segmentectomy and MLND on 1/17/18 for a T1bN0 acinar predominant adenocarcinoma. He has a prior history of a Left VATS, LLL for T1N2 mixed papillary and acinar adenocarcinoma on 8/7/17. He was treated with adjuvant Cisplatin/Pemetrexed. The patient presents today for a follow up appointment.\par \par CT chest and abdomen scan 3/23/19:\par -Stable 3mm RUL nodule\par -No evidence of recurrent tumor or metastatic disease. \par \par CT Scan of the Chest, Abdomen and Pelvis on 11/9/2018 revealed:\par -3 mm nodule in the superior aspect of the right lung, unchanged\par -Small splenic cysts\par -No evidence of metastatic disease\par \par The patient is very active. The patient denies shortness of breath, fever, chills, dysphagia or hemoptysis.

## 2019-04-19 NOTE — ASSESSMENT
[FreeTextEntry1] : 68 year old male s/p a uniportal Right VATS, Right Upper Lobe Posterior Segmentectomy and MLND on 1/17/18 for a T1bN0 acinar predominant adenocarcinoma. He has a prior history of a Left VATS, LLL for T1N2 mixed papillary and acinar adenocarcinoma on 8/7/17. He was treated with adjuvant Cisplatin/Pemetrexed. The patient presents today for a follow up appointment. \par \par CT chest and abdomen scan 3/23/19:\par -Stable 3mm RUL nodule\par -No evidence of recurrent tumor or metastatic disease. \par \par CT Scan of the Chest, Abdomen and Pelvis on 11/9/2018 revealed:\par -3 mm nodule in the superior aspect of the right lung, unchanged\par -No evidence of metastatic disease\par \par I have reviewed the images with the patient and made the following recommendations. I have recommended the patient follow up in 3 months with a CT scan of the chest. \par \par Written by Peg Bose NP, acting as a scribe for Alberto Trent MD.\par The documentation recorded by the scribe accurately reflects the service I personally performed and the decisions made by me. Alberto Trent MD\par \par

## 2019-04-19 NOTE — PHYSICAL EXAM
[Sclera] : the sclera and conjunctiva were normal [Neck Appearance] : the appearance of the neck was normal [Extraocular Movements] : extraocular movements were intact [Neck Cervical Mass (___cm)] : no neck mass was observed [] : no respiratory distress [Jugular Venous Distention Increased] : there was no jugular-venous distention [Respiration, Rhythm And Depth] : normal respiratory rhythm and effort [Exaggerated Use Of Accessory Muscles For Inspiration] : no accessory muscle use [Auscultation Breath Sounds / Voice Sounds] : lungs were clear to auscultation bilaterally [Diminished Respiratory Excursion] : normal chest expansion [Heart Rate And Rhythm] : heart rate was normal and rhythm regular [Abdomen Soft] : soft [Bowel Sounds] : normal bowel sounds [Abdomen Tenderness] : non-tender [Cervical Lymph Nodes Enlarged Posterior Bilaterally] : posterior cervical [Cervical Lymph Nodes Enlarged Anterior Bilaterally] : anterior cervical [Supraclavicular Lymph Nodes Enlarged Bilaterally] : supraclavicular [Abnormal Walk] : normal gait [Skin Color & Pigmentation] : normal skin color and pigmentation [No Focal Deficits] : no focal deficits [Oriented To Time, Place, And Person] : oriented to person, place, and time [Mood] : the mood was normal [Impaired Insight] : insight and judgment were intact [Affect] : the affect was normal [FreeTextEntry1] : Well-healed bilateral incisions

## 2019-08-06 ENCOUNTER — APPOINTMENT (OUTPATIENT)
Dept: THORACIC SURGERY | Facility: CLINIC | Age: 69
End: 2019-08-06

## 2019-09-03 ENCOUNTER — APPOINTMENT (OUTPATIENT)
Dept: THORACIC SURGERY | Facility: CLINIC | Age: 69
End: 2019-09-03
Payer: COMMERCIAL

## 2019-09-03 VITALS
DIASTOLIC BLOOD PRESSURE: 68 MMHG | SYSTOLIC BLOOD PRESSURE: 120 MMHG | HEART RATE: 63 BPM | OXYGEN SATURATION: 96 % | RESPIRATION RATE: 15 BRPM

## 2019-09-03 PROCEDURE — 99214 OFFICE O/P EST MOD 30 MIN: CPT

## 2019-09-03 NOTE — PHYSICAL EXAM
[Sclera] : the sclera and conjunctiva were normal [PERRL With Normal Accommodation] : pupils were equal in size, round, and reactive to light [Neck Appearance] : the appearance of the neck was normal [] : no respiratory distress [Respiration, Rhythm And Depth] : normal respiratory rhythm and effort [Heart Sounds] : normal S1 and S2 [Auscultation Breath Sounds / Voice Sounds] : lungs were clear to auscultation bilaterally [Murmurs] : no murmurs [Examination Of The Chest] : the chest was normal in appearance [Edema] : there was no peripheral edema [Abdomen Soft] : soft [Abdomen Tenderness] : non-tender [Cervical Lymph Nodes Enlarged Posterior Bilaterally] : posterior cervical [Cervical Lymph Nodes Enlarged Anterior Bilaterally] : anterior cervical [Supraclavicular Lymph Nodes Enlarged Bilaterally] : supraclavicular [Abnormal Walk] : normal gait [Skin Color & Pigmentation] : normal skin color and pigmentation [Skin Turgor] : normal skin turgor [No Focal Deficits] : no focal deficits [Affect] : the affect was normal [Oriented To Time, Place, And Person] : oriented to person, place, and time [Mood] : the mood was normal

## 2019-09-06 NOTE — CONSULT LETTER
[Please see my note below.] : Please see my note below. [Courtesy Letter:] : I had the pleasure of seeing your patient, [unfilled], in my office today. [Sincerely,] : Sincerely, [FreeTextEntry3] : \par \par \par Alberto Trent MD, FACS \par Chief, Division of Thoracic Surgery \par Director, Minimally Invasive Thoracic Surgery \par Department of Cardiovascular and Thoracic Surgery \par [facility] \par , Cardiovascular and Thoracic Surgery  [FreeTextEntry2] : Dr. Awad (Onc)\par Dr. Anthony Bull (Pulm)\par Dr. Namrata Fernandez (PCP)\par Dr. Jay Lisker (Cardio) \par

## 2019-09-06 NOTE — HISTORY OF PRESENT ILLNESS
[FreeTextEntry1] : Mr. Groves is a 68 year old male who presents today for follow up.  He is s/p uniportal Right VATS, RUL Posterior Segmentectomy and MLND on 1/17/18 for a T1bN0 acinar predominant adenocarcinoma. He has a prior history of a Left VATS, LLLobetomy for T1N2 mixed papillary and acinar adenocarcinoma on 8/7/17, treated with adjuvant Cisplatin/Pemetrexed.\par \par CT Chest on 7/12/19 revealed:\par - RUL 3 mm nodule, stable\par - No evidence of recurrent tumor or metastatic disease\par \par He reports feeling very well and recently completed a 400 mile bike trip.  He denies any fever, chills, cough, shortness of breath, chest pain, hemoptysis, or recent illness.

## 2019-09-06 NOTE — ASSESSMENT
[FreeTextEntry1] : 68 year old male, follow up.  He is s/p uniportal Right VATS, RUL Posterior Segmentectomy and MLND on 1/17/18 for a T1bN0 acinar predominant adenocarcinoma. He has a prior history of a Left VATS, LLLobetomy for T1N2 mixed papillary and acinar adenocarcinoma on 8/7/17, treated with adjuvant Cisplatin/Pemetrexed.\par \par CT Chest on 7/12/19 revealed:\par - RUL 3 mm nodule, stable\par - No evidence of recurrent tumor or metastatic disease\par \par I have reviewed the patient's medical records and diagnostic images during the time of this office visit, and I have made the following recommendation:\par 1.  Return to office for follow up with CT Chest in October (3 months from prior scan)\par \par \par Written by Yanira Johnson NP, acting as a scribe for Alberto Sr MD.\par \par The documentation recorded by the scribe accurately reflects the service I personally performed and the decisions made by me. ALBERTO SR MD

## 2019-09-24 NOTE — PATIENT PROFILE ADULT. - NS PRO OT REFERRAL QUES 2 YN
In the setting of urinary retention and likely poor oral intake  · Continue wiseman catheter for now  · Urinalysis with no infection  · Repeat BMP in AM  · If no improvement - renal ultrasound no

## 2019-11-13 NOTE — ED ADULT NURSE NOTE - PERIPHERAL VASCULAR WDL
Patient biba for AMS. Last known normal 9pm last night. EMS noted slurred speech en route. Patient is currently aaox2, follows commands appropriates. pmh dementia.
Pulses equal bilaterally, no edema present.

## 2019-11-26 ENCOUNTER — APPOINTMENT (OUTPATIENT)
Dept: THORACIC SURGERY | Facility: CLINIC | Age: 69
End: 2019-11-26
Payer: COMMERCIAL

## 2019-11-26 VITALS
BODY MASS INDEX: 20.04 KG/M2 | TEMPERATURE: 97.9 F | HEART RATE: 60 BPM | RESPIRATION RATE: 14 BRPM | OXYGEN SATURATION: 98 % | HEIGHT: 70 IN | WEIGHT: 140 LBS | SYSTOLIC BLOOD PRESSURE: 97 MMHG | DIASTOLIC BLOOD PRESSURE: 65 MMHG

## 2019-11-26 PROCEDURE — 99214 OFFICE O/P EST MOD 30 MIN: CPT

## 2019-11-26 NOTE — PHYSICAL EXAM
[Neck Appearance] : the appearance of the neck was normal [PERRL With Normal Accommodation] : pupils were equal in size, round, and reactive to light [Sclera] : the sclera and conjunctiva were normal [] : the neck was supple [Auscultation Breath Sounds / Voice Sounds] : lungs were clear to auscultation bilaterally [Heart Sounds] : normal S1 and S2 [Murmurs] : no murmurs [Respiration, Rhythm And Depth] : normal respiratory rhythm and effort [Abdomen Soft] : soft [Edema] : there was no peripheral edema [Examination Of The Chest] : the chest was normal in appearance [Abdomen Tenderness] : non-tender [Cervical Lymph Nodes Enlarged Anterior Bilaterally] : anterior cervical [Cervical Lymph Nodes Enlarged Posterior Bilaterally] : posterior cervical [Supraclavicular Lymph Nodes Enlarged Bilaterally] : supraclavicular [Abnormal Walk] : normal gait [Skin Color & Pigmentation] : normal skin color and pigmentation [Oriented To Time, Place, And Person] : oriented to person, place, and time [Skin Turgor] : normal skin turgor [No Focal Deficits] : no focal deficits [Affect] : the affect was normal [Mood] : the mood was normal

## 2019-11-29 NOTE — HISTORY OF PRESENT ILLNESS
[FreeTextEntry1] : Mr. Groves is a 68 year old male who presents today for follow up. He is a never smoker with a history of HLD, BPH and laminectomy.  He is s/p uniportal Right VATS, RUL Posterior Segmentectomy and MLND on 1/17/18 for a T1bN0 acinar predominant adenocarcinoma. He has a prior history of a Left VATS, LLLobetomy on 8/7/17 for [id number] mixed papillary, cribriform and acinar adenocarcinoma, and was treated with adjuvant Cisplatin/Pemetrexed.\par \par At previous visit in September 2019, CT Chest from July 2019 reviewed, stable RUL 3 mm nodule, no evidence of recurrent tumor or metastatic disease\par \par CT Chest on 11/11/19 revealed:\par - Stable RUL 3 mm nodule \par - No new or enlarging nodule infiltrate, axillary, supraclavicular, mediastinal or hilar lymph nodes\par \par He reports feeling very well and often completes long milage bike trips. He denies any fever, chills, cough, shortness of breath, chest pain, hemoptysis, or recent illness.

## 2019-11-29 NOTE — ASSESSMENT
[FreeTextEntry1] : 68 year old male, follow up, s/p uniportal Right VATS, RUL Posterior Segmentectomy and MLND on 1/17/18 for a T1bN0 acinar predominant adenocarcinoma. He has a prior history of a Left VATS, LLLobetomy on 8/7/17 for [id number] mixed papillary, cribriform and acinar adenocarcinoma, and was treated with adjuvant Cisplatin/Pemetrexed.\par \par CT Chest on 11/11/19 revealed:\par - Stable RUL 3 mm nodule \par - No new or enlarging nodule infiltrate, axillary, supraclavicular, mediastinal or hilar lymph nodes\par \par I have reviewed the patient's medical records and diagnostic images during the time of this office visit, and I have made the following recommendation:\par 1.  Return to office for follow up with CT Chest in 3 months.\par \par \par Written by Yanira Johnson NP, acting as a scribe for Alberto Sr MD.\par \par The documentation recorded by the scribe accurately reflects the service I personally performed and the decisions made by me. ALBERTO SR MD

## 2019-11-29 NOTE — CONSULT LETTER
[Courtesy Letter:] : I had the pleasure of seeing your patient, [unfilled], in my office today. [Dear  ___] : Dear  [unfilled], [Sincerely,] : Sincerely, [Please see my note below.] : Please see my note below. [FreeTextEntry3] : \par \par \par Alberto Trent MD, FACS \par Chief, Division of Thoracic Surgery \par Director, Minimally Invasive Thoracic Surgery \par Department of Cardiovascular and Thoracic Surgery \par [facility] \par , Cardiovascular and Thoracic Surgery  [FreeTextEntry2] : Dr. Awad (Onc)\par Dr. Anthony Bull (Pulm)\par Dr. Namrata Fernandez (PCP)\par Dr. Jay Lisker (Cardio) \par  \par

## 2020-01-21 ENCOUNTER — APPOINTMENT (OUTPATIENT)
Dept: UROLOGY | Facility: CLINIC | Age: 70
End: 2020-01-21
Payer: COMMERCIAL

## 2020-01-21 VITALS
RESPIRATION RATE: 14 BRPM | HEART RATE: 57 BPM | DIASTOLIC BLOOD PRESSURE: 70 MMHG | WEIGHT: 144.38 LBS | TEMPERATURE: 98.1 F | HEIGHT: 70 IN | SYSTOLIC BLOOD PRESSURE: 120 MMHG | OXYGEN SATURATION: 100 % | BODY MASS INDEX: 20.67 KG/M2

## 2020-01-21 PROCEDURE — 99213 OFFICE O/P EST LOW 20 MIN: CPT

## 2020-01-22 NOTE — HISTORY OF PRESENT ILLNESS
[FreeTextEntry1] : 67 yo M, seen 9/2017,  after he underwent VATS for lung lesion 8/7 at [facility], unable to void in PACU. He passed his TOV last visit. Was started on flomax, but had episode of orthostasis which resulted in fall and breaking of his nose. He was instructed to stop alpha blocker. Patient reports LUTS, with diminished stream, hesitancy, mild straining, and nocturia 4x/night. No hematuria, no dysuria, no incontinence.  Uroflow qMax 8 ml/s, vv 60 ml, PVR 31 mL. Of note, pt was diagnosed with adenocarcinoma of lung and will be undergoing chemotherapy. \par \par 01/21/2020: Patient presents for follow up. He reports  symptoms continue with weak stream, ugency, nocturia. Of note, he had multple surgical procedures since last visit, and did not have urinary retention again. No hematuria, no dysuria, no hesitancy, no straining. No incontinence. No fevers, no chills, no nausea, no vomiting, no flank pain.

## 2020-01-22 NOTE — PHYSICAL EXAM
[General Appearance - Well Developed] : well developed [General Appearance - Well Nourished] : well nourished [Well Groomed] : well groomed [Normal Appearance] : normal appearance [Abdomen Tenderness] : non-tender [General Appearance - In No Acute Distress] : no acute distress [Abdomen Soft] : soft [Urethral Meatus] : meatus normal [Costovertebral Angle Tenderness] : no ~M costovertebral angle tenderness [Urinary Bladder Findings] : the bladder was normal on palpation [Testes Mass (___cm)] : there were no testicular masses [Scrotum] : the scrotum was normal [Edema] : no peripheral edema [] : no respiratory distress [Exaggerated Use Of Accessory Muscles For Inspiration] : no accessory muscle use [Respiration, Rhythm And Depth] : normal respiratory rhythm and effort [Oriented To Time, Place, And Person] : oriented to person, place, and time [Affect] : the affect was normal [Mood] : the mood was normal [No Focal Deficits] : no focal deficits [Normal Station and Gait] : the gait and station were normal for the patient's age [Not Anxious] : not anxious [No Palpable Adenopathy] : no palpable adenopathy

## 2020-01-22 NOTE — ASSESSMENT
[FreeTextEntry1] : 65 yo M with BPH with LUTS. He had lightheadedness and fall on tamsulosin. He found no improvement with alfuzosin. We discussed that other alpha blockers would also risk orthostatic hypotension. We also discussed OAB medications, ie anticholinergics or beta agonists. GIven patient's obstructive symptoms, this could make symptoms worse and risks urinary retention. \par \par The risks, benefits, and alternatives of Transurethral Resection of Prostate were discussed with the patient. The patient was told that a specialized cystoscope is advanced into the prostatic urethra and obstructive prostatic tissue bulging into the lumen of the urethra is resected. Bleeding is controlled with cautery. A TrelliSoft evacuator is used irrigate the bladder and evacuate clot and TURP chips from the bladder. These prostate chips are then sent for pathologic evaluation. A la catheter is left in place at the end of the procedure, and continuous bladder irrigation is performed overnight. The following morning, CBI is held and the patient is discharged with the la catheter to gravity. The catheter is then removed the following week. Risks include bladder injury, urethral injury, incontinence, urinary retention, persistent bleeding which may require prolonged CBI or being taken back to the OR for cautery. All questions and concerns were answered and addressed. \par \par We discussed transurethral prostatic urethral lift, aka UroLift procedure. A cystoscope is advanced through the urethra in to prostatic urethra. Multiple transprostatic implants are deployed to relieve bladder outlet obstruction. Advantages include reduced likelihood of need for catheterization, reduced risk of ED, and less invasive procedure. Data shows shorter durability, with some patients reporting recurrence of symptoms 5 years later. \par \par Patient chooses observation for now, but will RTO if he wishes to pursue any treatments discussed.

## 2020-01-31 NOTE — ED ADULT NURSE NOTE - NS ED NURSE REPORT GIVEN DT
Spine appears normal, range of motion is not limited, no muscle or joint tenderness 14-Jul-2018 22:45

## 2020-02-06 NOTE — ASU PREOP CHECKLIST - DNR CLARIFICATION FORM COMPLETED
[Abnormal] : Uterus: Abnormal [Normal] : Parametria: Normal [de-identified] : Scant tissue from Embx [de-identified] : enlarged 15 cm sized n/a

## 2020-03-09 ENCOUNTER — RESULT REVIEW (OUTPATIENT)
Age: 70
End: 2020-03-09

## 2020-03-26 NOTE — DISCHARGE NOTE ADULT - NSFTFSERV2RD_GEN_ALL_CORE
for visual disturbance. Respiratory: Negative for cough, chest tightness, shortness of breath and wheezing. Cardiovascular: Positive for chest pain. Negative for palpitations. Gastrointestinal: Negative for abdominal distention, abdominal pain, constipation, diarrhea, nausea and vomiting. Genitourinary: Negative for difficulty urinating, dysuria, flank pain, frequency and urgency. Musculoskeletal: Negative for arthralgias, back pain, myalgias, neck pain and neck stiffness. Skin: Negative for color change and rash. Neurological: Negative for dizziness, tremors, seizures, syncope, speech difficulty, weakness, numbness and headaches. Psychiatric/Behavioral: Negative for agitation. The patient is not nervous/anxious. Except as noted above the remainder of the review of systems was reviewed and negative.        PAST MEDICAL HISTORY     Past Medical History:   Diagnosis Date    Abnormal finding on EKG 9/20/2016    Anxiety     Chest pain of unknown etiology 9/20/2016    Chest tightness or pressure 9/20/2016    Cholelithiasis 2014    CT abdomen    Depression     Depression with anxiety 2001    Family history of heart attack 11/8/2016    Heart palpitations 12/21/2014    Hypertension     Marijuana abuse 2015, 2016, 2017    Smoking     ST segment depression on EKG done 9/16/16 9/20/2016    Tobacco abuse 11/8/2016    Vitamin D insufficiency 2015     Past Surgical History:   Procedure Laterality Date    CARDIAC CATHETERIZATION  02/2017    DILATION AND CURETTAGE OF UTERUS N/A 1/16/2017    NOVASURE ABLATION performed by Mitzy Sanchez DO at [address] [place] [address]  01/2016    TUBAL LIGATION       Social History     Socioeconomic History    Marital status: Legally      Spouse name: None    Number of children: 3    Years of education: None    Highest education level: None   Occupational History    Occupation: student, 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 [address] Financial resource strain: None    Food insecurity     Worry: None     Inability: None    Transportation needs     Medical: None     Non-medical: None   Tobacco Use    Smoking status: Current Every Day Smoker     Packs/day: 0.50     Years: 24.00     Pack years: 12.00     Types: Cigarettes    Smokeless tobacco: Never Used   Substance and Sexual Activity    Alcohol use: Never     Alcohol/week: 0.0 standard drinks     Frequency: Never    Drug use: Not Currently     Frequency: 2.0 times per week     Types: Marijuana     Comment: None since 2018    Sexual activity: Yes     Partners: Male     Birth control/protection: Surgical     Comment: BLTL   Lifestyle    Physical activity     Days per week: None     Minutes per session: None    Stress: None   Relationships    Social connections     Talks on phone: None     Gets together: None     Attends Jew service: None     Active member of club or organization: None     Attends meetings of clubs or organizations: None     Relationship status: None    Intimate partner violence     Fear of current or ex partner: None     Emotionally abused: None     Physically abused: None     Forced sexual activity: None   Other Topics Concern    None   Social History Narrative    Born in [place], one of 4    , , children 3, one boy with her    Lives in an apartment in [place] with son and boyfriend    Works at [employer]  to [employer], 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 Appearance: Normal appearance. She is normal weight.  She is not ill-appearing, toxic-appearing or rhythm at 87 bpm no apparent acute ST elevations no ectopy good R wave progression  ms    RADIOLOGY:   Non-plain filmimages such as CT, Ultrasound and MRI are read by the radiologist. Plain radiographic images are visualized and preliminarily interpreted by the emergency physician with the below findings:        Interpretation per the Radiologist below, if available at the time ofthis note:    XR CHEST PORTABLE   Final Result   Impression:  No radiographic evidence of acute cardiopulmonary disease               ED BEDSIDE ULTRASOUND:   Performed by ED Physician - none    LABS:  Labs Reviewed   URINE RT REFLEX TO CULTURE - Abnormal; Notable for the following components:       Result Value    Blood, Urine TRACE (*)     Leukocyte Esterase, Urine TRACE (*)     All other components within normal limits   MICROSCOPIC URINALYSIS - Abnormal; Notable for the following components:    RBC, UA 6-10 (*)     All other components within normal limits   COMPREHENSIVE METABOLIC PANEL - Abnormal; Notable for the following components:    BUN 4 (*)     CREATININE 0.43 (*)     Calcium 8.4 (*)     Alkaline Phosphatase 131 (*)     All other components within normal limits   CULTURE, URINE   CBC WITH AUTO DIFFERENTIAL   PROTIME-INR   MAGNESIUM   CK   TROPONIN       All other labs were within normal range or not returned as of this dictation. EMERGENCY DEPARTMENT COURSE and DIFFERENTIAL DIAGNOSIS/MDM:   Vitals:    Vitals:    03/26/20 1900 03/26/20 1915 03/26/20 1930 03/26/20 2000   BP: (!) 157/45  (!) 156/79 (!) 156/76   Pulse: 87  74 75   Resp: 17  19 21   Temp:       TempSrc:       SpO2: 96% 97%  97%   Weight:       Height:                MDM patient is afebrile nontoxic no acute distress hemodynamically stable. Patient's physical exam is grossly markable respirations are even unlabored no adventitious lung sounds and no palpable chest tenderness.   Patient does not appear to be anxious and was provided with a chest pain Nursing

## 2020-03-31 ENCOUNTER — APPOINTMENT (OUTPATIENT)
Dept: THORACIC SURGERY | Facility: CLINIC | Age: 70
End: 2020-03-31

## 2020-04-21 NOTE — ASU PATIENT PROFILE, ADULT - NS SC CAGE ALCOHOL CUT DOWN
FOLLOW UP IN July FOR REPEAT FASTING LABS  INSULINS ARE REFILLED  START TAKING NOVOLIN R 3 TIMES DAILY - 25 UNITS FOR BREAKFAST AND DINNER, TAKE 10 UNITS PRIOR TO LUNCH  TRY TO WATCH DIET BETTER AND WALK DAILY  PHONE FOLLOW UP IN A MONTH TO SEE HOW BLOOD SUGARS ARE RUNNING    
no

## 2020-06-29 ENCOUNTER — APPOINTMENT (OUTPATIENT)
Dept: CARDIOLOGY | Facility: CLINIC | Age: 70
End: 2020-06-29
Payer: COMMERCIAL

## 2020-06-29 ENCOUNTER — NON-APPOINTMENT (OUTPATIENT)
Age: 70
End: 2020-06-29

## 2020-06-29 VITALS
HEIGHT: 70 IN | DIASTOLIC BLOOD PRESSURE: 46 MMHG | OXYGEN SATURATION: 96 % | TEMPERATURE: 97.1 F | BODY MASS INDEX: 20.19 KG/M2 | SYSTOLIC BLOOD PRESSURE: 84 MMHG | WEIGHT: 141 LBS | HEART RATE: 50 BPM

## 2020-06-29 VITALS — DIASTOLIC BLOOD PRESSURE: 71 MMHG | HEART RATE: 50 BPM | SYSTOLIC BLOOD PRESSURE: 123 MMHG

## 2020-06-29 DIAGNOSIS — Z86.39 PERSONAL HISTORY OF OTHER ENDOCRINE, NUTRITIONAL AND METABOLIC DISEASE: ICD-10-CM

## 2020-06-29 PROCEDURE — 99214 OFFICE O/P EST MOD 30 MIN: CPT

## 2020-06-29 PROCEDURE — 93000 ELECTROCARDIOGRAM COMPLETE: CPT
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
## 2020-06-29 NOTE — DISCUSSION/SUMMARY
[FreeTextEntry1] : He is a 69-year-old with a history of hyperlipidemia and coronary calcification with orthostatic hypotension on exam today.\par Discussed the need for increased salt and water in his diet.\par He is currently being treated for non-obstructive CAD with asa and statin. LDL near 70.\par Continue lipitor/asa.\par Plan for repeat echo to monitor MR.\par

## 2020-06-29 NOTE — PHYSICAL EXAM
[General Appearance - Well Nourished] : well nourished [General Appearance - Well Developed] : well developed [General Appearance - In No Acute Distress] : no acute distress [Normal Conjunctiva] : the conjunctiva exhibited no abnormalities [No Oral Pallor] : no oral pallor [Normal Jugular Venous V Waves Present] : normal jugular venous V waves present [Respiration, Rhythm And Depth] : normal respiratory rhythm and effort [Auscultation Breath Sounds / Voice Sounds] : lungs were clear to auscultation bilaterally [Heart Rate And Rhythm] : heart rate and rhythm were normal [Heart Sounds] : normal S1 and S2 [Arterial Pulses Normal] : the arterial pulses were normal [Edema] : no peripheral edema present [Bowel Sounds] : normal bowel sounds [Abdomen Soft] : soft [Abnormal Walk] : normal gait [Cyanosis, Localized] : no localized cyanosis [Impaired Insight] : insight and judgment were intact [Skin Turgor] : normal skin turgor [Oriented To Time, Place, And Person] : oriented to person, place, and time [Affect] : the affect was normal

## 2020-06-29 NOTE — REVIEW OF SYSTEMS
[see HPI] : see HPI [Chest Pain] : no chest pain [Shortness Of Breath] : no shortness of breath [Palpitations] : no palpitations [Negative] : Psychiatric

## 2020-07-22 ENCOUNTER — APPOINTMENT (OUTPATIENT)
Dept: CARDIOLOGY | Facility: CLINIC | Age: 70
End: 2020-07-22
Payer: COMMERCIAL

## 2020-07-22 VITALS
BODY MASS INDEX: 19.76 KG/M2 | HEIGHT: 70 IN | RESPIRATION RATE: 17 BRPM | WEIGHT: 138 LBS | SYSTOLIC BLOOD PRESSURE: 106 MMHG | HEART RATE: 45 BPM | DIASTOLIC BLOOD PRESSURE: 64 MMHG | OXYGEN SATURATION: 100 % | TEMPERATURE: 97.5 F

## 2020-07-22 VITALS — DIASTOLIC BLOOD PRESSURE: 58 MMHG | SYSTOLIC BLOOD PRESSURE: 112 MMHG

## 2020-07-22 VITALS — DIASTOLIC BLOOD PRESSURE: 60 MMHG | SYSTOLIC BLOOD PRESSURE: 104 MMHG

## 2020-07-22 PROCEDURE — 93000 ELECTROCARDIOGRAM COMPLETE: CPT

## 2020-07-22 PROCEDURE — 93306 TTE W/DOPPLER COMPLETE: CPT

## 2020-07-22 PROCEDURE — 99214 OFFICE O/P EST MOD 30 MIN: CPT

## 2020-07-22 NOTE — DISCUSSION/SUMMARY
[FreeTextEntry1] : He is a 69-year-old with a history of hyperlipidemia and coronary calcification on CT.\par LDL at goal.\par Tolerating aspirin and statin.\par Continue exercise and current regimen.\par Echo is unchanged. MR is at worst moderate.\par

## 2020-07-22 NOTE — HISTORY OF PRESENT ILLNESS
[FreeTextEntry1] : He feels okay. Runs and bicycles as before. Feels fine.\par No SOB.\par No palpitations.\par No near syncope\par Tolerating aspirin/statin.\par \par Prior:\par Thank you for referring him for cardiovascular evaluation prior to his planned wedge resection. 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 He underwent left lower lobectomy in August of 2017 without any difficulty. Approximately 12 days after the surgery he did have a syncopal episode after walking a 5K event. He underwent chemotherapy and reports feeling well overall. His exercise capacity has been stable throughout this time.. He denies any exertional shortness of breath or exertional chest pains. He does note some vague chest pains that occur randomly and are difficulty to describe.\par He has been treated for hyperlipidemia for many years with a statin and undergoes routine exercise stress testing at his internist's office. The most recent one was March of 2017. He was referred today because of an abnormal ECG.\par He exercises routinely including approximately running for 20 miles a week and bicycling for approximately 100 miles a week. He partakes in a state wide 400+ mile, 7 day race once a year.\par He denies any history of diabetes mellitus, hypertension, smoking or family history of premature coronary artery disease.\par He does have artery calcification mentioned as an aside on his chest CT. I reviewed these images and there is a very small area in the circumflex and RCA regions that might be consistent with coronary calcification.\par

## 2020-07-22 NOTE — REVIEW OF SYSTEMS
[Shortness Of Breath] : no shortness of breath [see HPI] : see HPI [Palpitations] : no palpitations [Chest Pain] : no chest pain [Negative] : Heme/Lymph

## 2020-07-22 NOTE — PHYSICAL EXAM
[General Appearance - Well Developed] : well developed [General Appearance - In No Acute Distress] : no acute distress [General Appearance - Well Nourished] : well nourished [Normal Conjunctiva] : the conjunctiva exhibited no abnormalities [Normal Jugular Venous V Waves Present] : normal jugular venous V waves present [No Oral Pallor] : no oral pallor [Respiration, Rhythm And Depth] : normal respiratory rhythm and effort [Auscultation Breath Sounds / Voice Sounds] : lungs were clear to auscultation bilaterally [Heart Sounds] : normal S1 and S2 [Heart Rate And Rhythm] : heart rate and rhythm were normal [Arterial Pulses Normal] : the arterial pulses were normal [Edema] : no peripheral edema present [Abdomen Soft] : soft [Bowel Sounds] : normal bowel sounds [Abnormal Walk] : normal gait [Cyanosis, Localized] : no localized cyanosis [Skin Turgor] : normal skin turgor [Oriented To Time, Place, And Person] : oriented to person, place, and time [Impaired Insight] : insight and judgment were intact [Affect] : the affect was normal

## 2020-07-22 NOTE — ADDENDUM
[FreeTextEntry1] : 1/15/18:\par He exercised for 11 minutes and 35 seconds of Jim protocol with no ECG changes or echocardiographic evidence of ischemia. His baseline echocardiogram shows mild to moderate mitral regurgitation and moderate pulmonic regurgitation with pulmonary pressures.\par He may proceed with the planned surgery.\par

## 2020-10-20 ENCOUNTER — APPOINTMENT (OUTPATIENT)
Dept: THORACIC SURGERY | Facility: CLINIC | Age: 70
End: 2020-10-20
Payer: COMMERCIAL

## 2020-10-20 VITALS
WEIGHT: 136 LBS | OXYGEN SATURATION: 96 % | HEART RATE: 64 BPM | SYSTOLIC BLOOD PRESSURE: 109 MMHG | DIASTOLIC BLOOD PRESSURE: 70 MMHG | BODY MASS INDEX: 19.47 KG/M2 | HEIGHT: 70 IN | RESPIRATION RATE: 15 BRPM

## 2020-10-20 PROCEDURE — 99214 OFFICE O/P EST MOD 30 MIN: CPT

## 2020-10-20 PROCEDURE — 99072 ADDL SUPL MATRL&STAF TM PHE: CPT

## 2020-10-20 RX ORDER — LYSINE 600 MG
TABLET ORAL
Refills: 0 | Status: ACTIVE | COMMUNITY

## 2020-10-20 RX ORDER — FEXOFENADINE HYDROCHLORIDE 180 MG/1
180 TABLET, FILM COATED ORAL
Refills: 0 | Status: DISCONTINUED | COMMUNITY
End: 2020-10-20

## 2020-10-20 NOTE — PHYSICAL EXAM
[Sclera] : the sclera and conjunctiva were normal [PERRL With Normal Accommodation] : pupils were equal in size, round, and reactive to light [Neck Appearance] : the appearance of the neck was normal [] : no respiratory distress [Respiration, Rhythm And Depth] : normal respiratory rhythm and effort [Auscultation Breath Sounds / Voice Sounds] : lungs were clear to auscultation bilaterally [Murmurs] : no murmurs [Heart Sounds] : normal S1 and S2 [Abdomen Soft] : soft [Abdomen Tenderness] : non-tender [Cervical Lymph Nodes Enlarged Posterior Bilaterally] : posterior cervical [Cervical Lymph Nodes Enlarged Anterior Bilaterally] : anterior cervical [Supraclavicular Lymph Nodes Enlarged Bilaterally] : supraclavicular [Abnormal Walk] : normal gait [Skin Color & Pigmentation] : normal skin color and pigmentation [No Focal Deficits] : no focal deficits [Skin Turgor] : normal skin turgor [Oriented To Time, Place, And Person] : oriented to person, place, and time [Affect] : the affect was normal [Mood] : the mood was normal

## 2020-10-20 NOTE — HISTORY OF PRESENT ILLNESS
[FreeTextEntry1] : Mr. Groves is a 69 year old male who presents today for follow up. \par \par He is s/p Left VATS, LLLobetomy for T1N2 mixed papillary and acinar adenocarcinoma on 8/7/17, completed adjuvant Cisplatin/Pemetrexed.\par \par Most recently, he is s/p uniportal Right VATS, RUL Posterior Segmentectomy and MLND on 1/17/18 for a T1bN0 acinar predominant adenocarcinoma.\par \par CT Chest on 7/12/19 revealed:\par - RUL 3 mm nodule, stable\par - No evidence of recurrent tumor or metastatic disease\par \par CT chest scan 3/17/2020: \par - No suspicious adenopathy or nodules developed. \par \par CT chest scan 9/29/2020:\par -No suspicious, new or enlarging pulmonary nodules. \par \par He denies any fever, chills, cough, shortness of breath, chest pain, hemoptysis, or recent illness.

## 2020-10-20 NOTE — ASSESSMENT
[FreeTextEntry1] : 69 year old male, follow up, s/p Left VATS, LLLobetomy for T1N2 mixed papillary and acinar adenocarcinoma on 8/7/17, completed adjuvant Cisplatin/Pemetrexed.  Most recently, s/p uniportal Right VATS, RUL Posterior Segmentectomy and MLND on 1/17/18 for a T1bN0 acinar predominant adenocarcinoma.\par \par CT chest scan 9/29/2020:\par -No suspicious, new or enlarging pulmonary nodules\par \par I have reviewed the patient's medical records and diagnostic images during the time of this office visit, and I have made the following recommendation:\par 1.  Return to office for follow up with CT Chest in 6 months\par \par \par I personally performed the services described in the documentation, reviewed the documentation recorded by the scribe in my presence and it accurately and completely records my words and actions.\par \par I, Yanira Johnson, am scribing for and the presence of HAYDEN Longoria the following sections HISTORY OF PRESENT ILLNESSES, PAST MEDICAL/FAMILY/SOCIAL HISTORY; REVIEW OF SYSTEMS; VITAL SIGNS; PHYSICAL EXAM; DISPOSITION.

## 2020-10-20 NOTE — CONSULT LETTER
[Courtesy Letter:] : I had the pleasure of seeing your patient, [unfilled], in my office today. [Please see my note below.] : Please see my note below. [Consult Closing:] : Thank you very much for allowing me to participate in the care of this patient.  If you have any questions, please do not hesitate to contact me. [Sincerely,] : Sincerely, [FreeTextEntry3] : \par \par \par Alberto Trent MD, FACS \par Chief, Division of Thoracic Surgery \par Director, Minimally Invasive Thoracic Surgery \par Department of Cardiovascular and Thoracic Surgery \par [facility] \par , Cardiovascular and Thoracic Surgery [FreeTextEntry2] : Dr. Awad (Onc)\par Dr. Anthony Bull (Pulm)\par Dr. Namrata Fernandez (PCP)\par Dr. Jay Lisker (Cardio) \par

## 2021-04-20 ENCOUNTER — APPOINTMENT (OUTPATIENT)
Dept: THORACIC SURGERY | Facility: CLINIC | Age: 71
End: 2021-04-20
Payer: COMMERCIAL

## 2021-04-20 VITALS
HEART RATE: 60 BPM | DIASTOLIC BLOOD PRESSURE: 68 MMHG | HEIGHT: 70 IN | TEMPERATURE: 97.9 F | SYSTOLIC BLOOD PRESSURE: 112 MMHG | OXYGEN SATURATION: 95 % | BODY MASS INDEX: 20.33 KG/M2 | WEIGHT: 142 LBS

## 2021-04-20 PROCEDURE — 99072 ADDL SUPL MATRL&STAF TM PHE: CPT

## 2021-04-20 PROCEDURE — 99214 OFFICE O/P EST MOD 30 MIN: CPT

## 2021-04-20 NOTE — CONSULT LETTER
[Dear  ___] : Dear  [unfilled], [Courtesy Letter:] : I had the pleasure of seeing your patient, [unfilled], in my office today. [Please see my note below.] : Please see my note below. [Sincerely,] : Sincerely, [FreeTextEntry2] : Dr. Awad (Onc)\par Dr. Anthony Bull (Pulm)\par Dr. Namrata Fernandez (PCP)\par Dr. Jay Lisker (Cardio) \par  [FreeTextEntry3] : Alberto Trent MD, FACS \par Chief, Division of Thoracic Surgery \par Director, Minimally Invasive Thoracic Surgery \par Department of Cardiovascular and Thoracic Surgery \par [facility] \par , Cardiovascular and Thoracic Surgery\par \par \par

## 2021-04-20 NOTE — HISTORY OF PRESENT ILLNESS
[FreeTextEntry1] : Mr. Groves is a 70 year old male who presents today for follow up. \par \par He is s/p Left VATS, LLLobetomy for T1N2 mixed papillary and acinar adenocarcinoma on 8/7/17, completed adjuvant Cisplatin/Pemetrexed.\par \par Most recently, he is s/p uniportal Right VATS, RUL Posterior Segmentectomy and MLND on 1/17/18 for a T1bN0 acinar predominant adenocarcinoma.\par \par CT Chest on 7/12/19 revealed:\par - RUL 3 mm nodule, stable\par - No evidence of recurrent tumor or metastatic disease\par \par CT chest on 3/17/2020: \par - No suspicious adenopathy or nodules developed. \par \par CT chest on 9/29/2020:\par -No suspicious, new or enlarging pulmonary nodules. \par \par CT Chest on 4/6/21:\par - Stable post op changes\par \par Patient presents to office for follow up. Patient denies worsening SOB, chest pain, cough, hemoptysis, fever, chills, night sweats, lightheadedness or dizziness.\par

## 2021-04-20 NOTE — PHYSICAL EXAM
[] : no respiratory distress [Exaggerated Use Of Accessory Muscles For Inspiration] : no accessory muscle use [Respiration, Rhythm And Depth] : normal respiratory rhythm and effort [Auscultation Breath Sounds / Voice Sounds] : lungs were clear to auscultation bilaterally [Examination Of The Chest] : the chest was normal in appearance [Chest Visual Inspection Thoracic Asymmetry] : no chest asymmetry [Diminished Respiratory Excursion] : normal chest expansion [Breast Appearance] : normal in appearance [Cervical Lymph Nodes Enlarged Posterior Bilaterally] : posterior cervical [Breast Palpation Mass] : no palpable masses [Cervical Lymph Nodes Enlarged Anterior Bilaterally] : anterior cervical [Supraclavicular Lymph Nodes Enlarged Bilaterally] : supraclavicular [Oriented To Time, Place, And Person] : oriented to person, place, and time [Impaired Insight] : insight and judgment were intact [Affect] : the affect was normal

## 2021-04-20 NOTE — ASSESSMENT
[FreeTextEntry1] : Mr. Groves is a 70 year old male who presents today for follow up. \par \par He is s/p Left VATS, LLLobetomy for T1N2 mixed papillary and acinar adenocarcinoma on 8/7/17, completed adjuvant Cisplatin/Pemetrexed.\par \par Most recently, he is s/p uniportal Right VATS, RUL Posterior Segmentectomy and MLND on 1/17/18 for a T1bN0 acinar predominant adenocarcinoma.\par \par I have independently reviewed the medical records and imaging at the time of this office consultation, and discussed the following interpretations and recommendations with the patient:\par - CT scan showed no evidence of recurrence, recommended patient to return to office in 6 months with repeat CT chest, no contrast.\par - Follow up with Dr. Bull \par \par Recommendations reviewed with patient during this office visit, and all questions answered; Patient instructed on the importance of follow up and verbalizes understanding.\par \par I personally performed the services described in the documentation, reviewed the documentation recorded by the scribe in my presence and it accurately and completely records my words and actions.\par \par I, ROSE Gomez-C, am scribing for and the presence of EDINSON LongoriaIM, the following sections HISTORY OF PRESENT ILLNESS, PAST MEDICAL/FAMILY/SOCIAL HISTORY; REVIEW OF SYSTEMS; VITAL SIGNS; PHYSICAL EXAM; DISPOSITION.\par \par \par

## 2021-09-01 ENCOUNTER — NON-APPOINTMENT (OUTPATIENT)
Age: 71
End: 2021-09-01

## 2021-09-01 ENCOUNTER — APPOINTMENT (OUTPATIENT)
Dept: INFECTIOUS DISEASE | Facility: CLINIC | Age: 71
End: 2021-09-01
Payer: COMMERCIAL

## 2021-09-01 VITALS
DIASTOLIC BLOOD PRESSURE: 73 MMHG | HEIGHT: 70 IN | BODY MASS INDEX: 19.9 KG/M2 | TEMPERATURE: 98.5 F | SYSTOLIC BLOOD PRESSURE: 122 MMHG | RESPIRATION RATE: 16 BRPM | WEIGHT: 139 LBS | OXYGEN SATURATION: 98 %

## 2021-09-01 DIAGNOSIS — Z86.19 PERSONAL HISTORY OF OTHER INFECTIOUS AND PARASITIC DISEASES: ICD-10-CM

## 2021-09-01 PROCEDURE — 99215 OFFICE O/P EST HI 40 MIN: CPT

## 2021-09-01 NOTE — PHYSICAL EXAM
[General Appearance - Alert] : alert [General Appearance - In No Acute Distress] : in no acute distress [General Appearance - Well Nourished] : well nourished [Sclera] : the sclera and conjunctiva were normal [PERRL With Normal Accommodation] : pupils were equal in size, round, reactive to light [Outer Ear] : the ears and nose were normal in appearance [Oropharynx] : the oropharynx was normal with no thrush [Neck Appearance] : the appearance of the neck was normal [Neck Cervical Mass (___cm)] : no neck mass was observed [Auscultation Breath Sounds / Voice Sounds] : lungs were clear to auscultation bilaterally [Heart Rate And Rhythm] : heart rate was normal and rhythm regular [Heart Sounds Gallop] : no gallops [Heart Sounds] : normal S1 and S2 [Murmurs] : no murmurs [Heart Sounds Pericardial Friction Rub] : no pericardial rub [Full Pulse] : the pedal pulses are present [Edema] : there was no peripheral edema [Bowel Sounds] : normal bowel sounds [Abdomen Soft] : soft [Abdomen Tenderness] : non-tender [] : no hepato-splenomegaly [Abdomen Mass (___ Cm)] : no abdominal mass palpated [FreeTextEntry1] : Intact light-touch in foot

## 2021-09-01 NOTE — DATA REVIEWED
[FreeTextEntry1] : Outside MRI and blood work reviewed. \par ESR/CRP normal\par \par Culture: S. Maltophilia (TMP/SMX S, Levaquin S)\par

## 2021-09-01 NOTE — REVIEW OF SYSTEMS
[Fever] : no fever [Chills] : no chills [Feeling Sick] : not feeling sick [Feeling Tired] : not feeling tired [Nasal Discharge] : no nasal discharge [Sore Throat] : no sore throat [Hoarseness] : no hoarseness [Chest Pain] : no chest pain [Palpitations] : no palpitations [Leg Claudication] : no intermittent leg claudication [Lower Ext Edema] : no extremity edema [Shortness Of Breath] : no shortness of breath [Cough] : no cough [Sputum] : not coughing up ~M sputum [Abdominal Pain] : no abdominal pain [Constipation] : no constipation [Diarrhea] : no diarrhea [Dysuria] : no dysuria [Joint Pain] : no joint pain [Joint Swelling] : no joint swelling [Joint Stiffness] : no joint stiffness

## 2021-09-01 NOTE — HISTORY OF PRESENT ILLNESS
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 Continued riding through week with progression of pain, eventually went to Urgent Care and given Clindamycin. Didn't improve with Clindamycin, changed to Doxycycline and topical mupirocen by dermatologist. Took x 1 week without much improvement. No significant drainage although had brownish discoloration of bandage. Culture by dermatologist (8/6) grew Stenotrophomonas, antibiotics changed to TMP/SMX DS BID x 10 days (although since then has been extended, currently on it) - patient preferred not to take levaquin because of tendinopathy risk as a runner. Also saw Dr. Mercado ([facility]) where X-Ray and MRI suggested osteomyelitis. Foot looks like is stabilized but not improving per patient. \par MRI: "Focal marrow edema involving the distant phalanx of the 4th toe with loss of cortical margination". \par No history of diabetes or vascular compromise. Runner, no leg claudication. \par \par No fever, no chills, no night sweats.

## 2021-09-01 NOTE — CONSULT LETTER
[Dear  ___] : Dear  [unfilled], [( Thank you for referring [unfilled] for consultation for _____ )] : Thank you for referring [unfilled] for consultation for [unfilled] [Please see my note below.] : Please see my note below. [FreeTextEntry2] : Dr. Martin Mercado [FreeTextEntry3] : Thank you for allowing me to participate in the care of this patient.  Please feel free to contact me with any questions.\par \par Sincerely, \par Michael I. Oppenheim, MD\par \par  [DrClaudia  ___] : Dr. FLORES

## 2021-10-06 ENCOUNTER — APPOINTMENT (OUTPATIENT)
Dept: INFECTIOUS DISEASE | Facility: CLINIC | Age: 71
End: 2021-10-06
Payer: COMMERCIAL

## 2021-10-06 VITALS
BODY MASS INDEX: 19.9 KG/M2 | HEART RATE: 71 BPM | HEIGHT: 70 IN | DIASTOLIC BLOOD PRESSURE: 66 MMHG | TEMPERATURE: 97.7 F | OXYGEN SATURATION: 98 % | SYSTOLIC BLOOD PRESSURE: 101 MMHG | WEIGHT: 139 LBS | RESPIRATION RATE: 16 BRPM

## 2021-10-06 DIAGNOSIS — Z51.81 ENCOUNTER FOR THERAPEUTIC DRUG LVL MONITORING: ICD-10-CM

## 2021-10-06 PROCEDURE — 99211 OFF/OP EST MAY X REQ PHY/QHP: CPT

## 2021-10-06 NOTE — PHYSICAL EXAM
[Heart Rate And Rhythm] : heart rate was normal and rhythm regular [Heart Sounds] : normal S1 and S2 [Heart Sounds Gallop] : no gallops [Murmurs] : no murmurs [Heart Sounds Pericardial Friction Rub] : no pericardial rub [Full Pulse] : the pedal pulses are present [Edema] : there was no peripheral edema [Bowel Sounds] : normal bowel sounds [Abdomen Soft] : soft [Abdomen Tenderness] : non-tender [] : no hepato-splenomegaly [Abdomen Mass (___ Cm)] : no abdominal mass palpated [Costovertebral Angle Tenderness] : no CVA tenderness [FreeTextEntry1] : Left 4th toe with mild erythema over distal phalanx area. No skin breakdown/ulcer/fistula. No nail growth. No tenderness to deep palpation of distal toe from any side.

## 2021-10-06 NOTE — ASSESSMENT
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 Now at 5 weeks of therapy with some residual inflammatory signs. Will continue treatment to 8 weeks and re-assess before deciding whether to complete therapy. Will need close follow-up after completion.\par \par Will check bloodwork to assure no systemic complications from antibiotics.\par \par

## 2021-10-06 NOTE — HISTORY OF PRESENT ILLNESS
[FreeTextEntry1] : Completed 5 weeks of oral therapy.\par Has some loose stools without any GI upset or anorexia.\par Nocturia might be slightly worse.\par No signs of tendinopathy\par \par No toe pain. Ulcer closed about 3 weeks ago. Redness has been shrinking but still present. Mild swelling persists.\par Feels toe seems more normal.\par Has resumed riding - no issues with toe. Not wearing tight or clipped-in shoe; regular sneaker only. \par \par

## 2021-10-08 ENCOUNTER — TRANSCRIPTION ENCOUNTER (OUTPATIENT)
Age: 71
End: 2021-10-08

## 2021-10-08 LAB
ALBUMIN SERPL ELPH-MCNC: 4.4 G/DL
ALP BLD-CCNC: 69 U/L
ALT SERPL-CCNC: 38 U/L
ANION GAP SERPL CALC-SCNC: 11 MMOL/L
AST SERPL-CCNC: 37 U/L
BASOPHILS # BLD AUTO: 0.03 K/UL
BASOPHILS NFR BLD AUTO: 0.8 %
BILIRUB SERPL-MCNC: 0.6 MG/DL
BUN SERPL-MCNC: 19 MG/DL
CALCIUM SERPL-MCNC: 9.7 MG/DL
CHLORIDE SERPL-SCNC: 99 MMOL/L
CO2 SERPL-SCNC: 27 MMOL/L
CREAT SERPL-MCNC: 1.1 MG/DL
CRP SERPL-MCNC: <3 MG/L
EOSINOPHIL # BLD AUTO: 0.15 K/UL
EOSINOPHIL NFR BLD AUTO: 3.8 %
ERYTHROCYTE [SEDIMENTATION RATE] IN BLOOD BY WESTERGREN METHOD: 5 MM/HR
HCT VFR BLD CALC: 44.5 %
HGB BLD-MCNC: 14.4 G/DL
IMM GRANULOCYTES NFR BLD AUTO: 0.3 %
LYMPHOCYTES # BLD AUTO: 1.16 K/UL
LYMPHOCYTES NFR BLD AUTO: 29.7 %
MAN DIFF?: NORMAL
MCHC RBC-ENTMCNC: 32 PG
MCHC RBC-ENTMCNC: 32.4 GM/DL
MCV RBC AUTO: 98.9 FL
MONOCYTES # BLD AUTO: 0.52 K/UL
MONOCYTES NFR BLD AUTO: 13.3 %
NEUTROPHILS # BLD AUTO: 2.03 K/UL
NEUTROPHILS NFR BLD AUTO: 52.1 %
PLATELET # BLD AUTO: 179 K/UL
POTASSIUM SERPL-SCNC: 4.5 MMOL/L
PROT SERPL-MCNC: 6.4 G/DL
RBC # BLD: 4.5 M/UL
RBC # FLD: 12.9 %
SODIUM SERPL-SCNC: 138 MMOL/L
WBC # FLD AUTO: 3.9 K/UL

## 2021-10-18 PROBLEM — C34.32 PRIMARY ADENOCARCINOMA OF LOWER LOBE OF LEFT LUNG: Status: ACTIVE | Noted: 2017-11-28

## 2021-10-19 ENCOUNTER — APPOINTMENT (OUTPATIENT)
Dept: THORACIC SURGERY | Facility: CLINIC | Age: 71
End: 2021-10-19
Payer: COMMERCIAL

## 2021-10-19 VITALS
WEIGHT: 140 LBS | BODY MASS INDEX: 20.04 KG/M2 | OXYGEN SATURATION: 99 % | HEART RATE: 61 BPM | TEMPERATURE: 98 F | RESPIRATION RATE: 16 BRPM | SYSTOLIC BLOOD PRESSURE: 96 MMHG | DIASTOLIC BLOOD PRESSURE: 62 MMHG | HEIGHT: 70 IN

## 2021-10-19 DIAGNOSIS — C34.32 MALIGNANT NEOPLASM OF LOWER LOBE, LEFT BRONCHUS OR LUNG: ICD-10-CM

## 2021-10-19 PROCEDURE — 99214 OFFICE O/P EST MOD 30 MIN: CPT

## 2021-10-19 NOTE — CONSULT LETTER
[Dear  ___] : Dear  [unfilled], [Courtesy Letter:] : I had the pleasure of seeing your patient, [unfilled], in my office today. [Please see my note below.] : Please see my note below. [Sincerely,] : Sincerely, [FreeTextEntry2] : Dr. Awad (Onc)\par Dr. Anthony Bull (Pulm)\par Dr. Namrata Fernandez (PCP)\par Dr. Jay Lisker (Cardio) \par  [FreeTextEntry3] : Alberto Trent MD, FACS \par Chief, Division of Thoracic Surgery \par Director, Minimally Invasive Thoracic Surgery \par Department of Cardiovascular and Thoracic Surgery \par [facility] \par , Cardiovascular and Thoracic Surgery\par \par \par

## 2021-10-19 NOTE — ASSESSMENT
[FreeTextEntry1] : Mr. Groves is a 70 year old male who presents today for follow up. \par \par He is s/p Left VATS, LLLobetomy for T1N2 mixed papillary and acinar adenocarcinoma on 8/7/17, completed adjuvant Cisplatin/Pemetrexed.\par \par Most recently, he is s/p uniportal Right VATS, RUL Posterior Segmentectomy and MLND on 1/17/18 for a T1bN0 acinar predominant adenocarcinoma.\par \par I have independently reviewed the medical records and imaging at the time of this office consultation, and discussed the following interpretations and recommendations with the patient:\par - Return to office for follow up with CT Chest in 6 months\par Recommendations reviewed with patient during this office visit, and all questions answered; Patient instructed on the importance of follow up and verbalizes understanding.\par \par \par I personally performed the services described in the documentation, reviewed the documentation recorded by the scribe in my presence and it accurately and completely records my words and actions.\par \par \par I, Yanira Johnson, am scribing for and the presence of Dr. Trent the following sections HISTORY OF PRESENT ILLNESSES, PAST MEDICAL/FAMILY/SOCIAL HISTORY; REVIEW OF SYSTEMS; VITAL SIGNS; PHYSICAL EXAM; DISPOSITION.

## 2021-10-19 NOTE — PHYSICAL EXAM
[Sclera] : the sclera and conjunctiva were normal [PERRL With Normal Accommodation] : pupils were equal in size, round, and reactive to light [Neck Appearance] : the appearance of the neck was normal [] : no respiratory distress [Respiration, Rhythm And Depth] : normal respiratory rhythm and effort [Auscultation Breath Sounds / Voice Sounds] : lungs were clear to auscultation bilaterally [Heart Sounds] : normal S1 and S2 [Murmurs] : no murmurs [Examination Of The Chest] : the chest was normal in appearance [Abdomen Soft] : soft [Cervical Lymph Nodes Enlarged Posterior Bilaterally] : posterior cervical [Abdomen Tenderness] : non-tender [Cervical Lymph Nodes Enlarged Anterior Bilaterally] : anterior cervical [Supraclavicular Lymph Nodes Enlarged Bilaterally] : supraclavicular [Abnormal Walk] : normal gait [Skin Color & Pigmentation] : normal skin color and pigmentation [Skin Turgor] : normal skin turgor [No Focal Deficits] : no focal deficits [Oriented To Time, Place, And Person] : oriented to person, place, and time [Affect] : the affect was normal [Mood] : the mood was normal

## 2021-10-19 NOTE — DATA REVIEWED
[FreeTextEntry1] : Independently reviewed the following imaging:\par - CT Chest on 4/6/21\par - CT Chest on 10/4/21

## 2021-10-25 ENCOUNTER — APPOINTMENT (OUTPATIENT)
Dept: INFECTIOUS DISEASE | Facility: CLINIC | Age: 71
End: 2021-10-25
Payer: COMMERCIAL

## 2021-10-25 PROCEDURE — 99212 OFFICE O/P EST SF 10 MIN: CPT | Mod: 95

## 2021-10-25 NOTE — ASSESSMENT
[FreeTextEntry1] : OM of toe with slow clinical improvement, now in week 8 of Augmentin/Levaquin.\par Oral therapy studies for OM used extended courses and given that patient is still perceiving improvement, will extend course to 12 weeks an re-evaluate then.\par \par Medication renewed, reinforced for patient to palpate Achilles tendon periodically as has resumed riding (but with regular sneakers, not riding shoes/click-in. \par \par

## 2021-10-25 NOTE — HISTORY OF PRESENT ILLNESS
[Home] : at home, [unfilled] , at the time of the visit. [Other Location: e.g. Home (Enter Location, City,State)___] : at [unfilled] [Verbal consent obtained from patient] : the patient, [unfilled] [FreeTextEntry1] : Patient reports continued improvement of toe, but notes that there is still some redness and swelling. No pain. \par Does not think improvement has plataeu'd - thinks is still improving.\par Minimal GI and no other issue with medications.\par Generally feels well.\par Had bloods by PCP and all normal (other than MCV of 100)\par

## 2021-10-25 NOTE — PHYSICAL EXAM
[FreeTextEntry1] : Left 4th toe with minimal distal erythema, swelling. No pain when patient manipulations toe in any plane.

## 2021-11-17 ENCOUNTER — APPOINTMENT (OUTPATIENT)
Dept: INFECTIOUS DISEASE | Facility: CLINIC | Age: 71
End: 2021-11-17
Payer: COMMERCIAL

## 2021-11-17 VITALS
BODY MASS INDEX: 20.04 KG/M2 | RESPIRATION RATE: 16 BRPM | HEIGHT: 70 IN | SYSTOLIC BLOOD PRESSURE: 110 MMHG | TEMPERATURE: 98.7 F | HEART RATE: 64 BPM | DIASTOLIC BLOOD PRESSURE: 69 MMHG | WEIGHT: 140 LBS | OXYGEN SATURATION: 98 %

## 2021-11-17 PROCEDURE — 99212 OFFICE O/P EST SF 10 MIN: CPT

## 2021-11-17 NOTE — ASSESSMENT
[FreeTextEntry1] : Completing 11 weeks of oral therapy with levofloxacin/amox-clavulanic acid, significant improvement in toe, some residual swelling but no other signs of active infection.\par \par Will complete 12 weeks as planned then discontinue; baseline x-ray of toe for long-term follow-up.

## 2021-11-17 NOTE — HISTORY OF PRESENT ILLNESS
[FreeTextEntry1] : Completing 11 weeks of oral therapy.\par Feels well - able to be active without pain or problems with toe.\par Feels it is still swollen. \par No redness or drainage. \par Using probiotics which are helping with his BMs

## 2021-11-17 NOTE — PHYSICAL EXAM
[FreeTextEntry1] : 4th toe with some distal callus with umbilicated center but closed base. Slight swelling of toe. Non-tender. No erythema. Full ROM without discomfort.

## 2021-12-08 ENCOUNTER — APPOINTMENT (OUTPATIENT)
Dept: INFECTIOUS DISEASE | Facility: CLINIC | Age: 71
End: 2021-12-08
Payer: COMMERCIAL

## 2021-12-08 VITALS
WEIGHT: 140 LBS | HEIGHT: 70 IN | BODY MASS INDEX: 20.04 KG/M2 | HEART RATE: 55 BPM | DIASTOLIC BLOOD PRESSURE: 65 MMHG | OXYGEN SATURATION: 97 % | TEMPERATURE: 97.7 F | SYSTOLIC BLOOD PRESSURE: 109 MMHG

## 2021-12-08 PROCEDURE — 99212 OFFICE O/P EST SF 10 MIN: CPT

## 2021-12-08 NOTE — PHYSICAL EXAM
[FreeTextEntry1] : Left 4th toe without discoloration, no ulceration of fistula. Non-tender to deep palpation/squeezing of toe

## 2021-12-08 NOTE — ASSESSMENT
[FreeTextEntry1] : s/p prolonged course of oral antibiotics for distal 4th distal phalanx OM, now with benign exam though some discomfort reported with exercise. Post-treatment film with intact cortical surfaces. \par \par Will continue to observe post-treatment, intermittent plain films for surveillance.

## 2021-12-08 NOTE — HISTORY OF PRESENT ILLNESS
[FreeTextEntry1] : Patient concerned about some discomfort in left 4th toe with exercise. \par Denies other systemic symptoms.\par Toe does not hurt at rest or with normal activity, but gets some 'twinges' with intense activity.\par X-Ray 12/3 normal \par No redness of toe, no ulceration / or drainage.

## 2021-12-08 NOTE — DATA REVIEWED
[FreeTextEntry1] : 12/3 X-Ray: "The bony texture appears normal.... No soft tissue abnormalities are identified".\par Images reviewed - normal cortical rim of 4th distal phalanx\par \par Reviewed pre-treatment X-Ray: Indistinct cortical margins of distal aspect of 4th distal phalanx\par \par MRI reviewed: Dark T1 at distal 4th distal phalanx

## 2022-01-12 ENCOUNTER — APPOINTMENT (OUTPATIENT)
Dept: INFECTIOUS DISEASE | Facility: CLINIC | Age: 72
End: 2022-01-12
Payer: COMMERCIAL

## 2022-01-12 VITALS
DIASTOLIC BLOOD PRESSURE: 69 MMHG | BODY MASS INDEX: 20.76 KG/M2 | HEART RATE: 66 BPM | WEIGHT: 145 LBS | SYSTOLIC BLOOD PRESSURE: 106 MMHG | HEIGHT: 70 IN | OXYGEN SATURATION: 98 % | TEMPERATURE: 98.2 F

## 2022-01-12 PROCEDURE — 99212 OFFICE O/P EST SF 10 MIN: CPT

## 2022-01-12 NOTE — HISTORY OF PRESENT ILLNESS
[FreeTextEntry1] : Off antibiotics about 1 month\par Feels well, has resumed normal athletic activities without pain in foot\par Does not perceive swelling or redness of 4th toe after exercise or otherwise\par

## 2022-01-12 NOTE — ASSESSMENT
[FreeTextEntry1] : No clinical signs of relapse 1 month off antibiotics. X-Ray without concerning findings\par To re-check X-Ray in 2 months (3 months off therapy). \par Advised no restrictions on athletic activity\par Suggested visit to podiatrist to address callouses rather than self-remove and risk injury/introduction of bacteria.

## 2022-01-12 NOTE — PHYSICAL EXAM
[General Appearance - Alert] : alert [General Appearance - In No Acute Distress] : in no acute distress [General Appearance - Well-Appearing] : healthy appearing [FreeTextEntry1] : Left 4th toe with callous at base, normal shoe-related mild erythema at end of toe; no warmth. No fistula Non-tender to palpation in all planes.

## 2022-02-24 NOTE — H&P PST ADULT - ALCOHOL USE HISTORY SINGLE SELECT
Retention Suture Text: Retention sutures were placed to support the closure and prevent dehiscence. yes...

## 2022-03-23 ENCOUNTER — APPOINTMENT (OUTPATIENT)
Dept: INFECTIOUS DISEASE | Facility: CLINIC | Age: 72
End: 2022-03-23
Payer: COMMERCIAL

## 2022-03-23 VITALS
OXYGEN SATURATION: 97 % | DIASTOLIC BLOOD PRESSURE: 74 MMHG | HEART RATE: 64 BPM | TEMPERATURE: 98 F | HEIGHT: 61 IN | BODY MASS INDEX: 27.38 KG/M2 | SYSTOLIC BLOOD PRESSURE: 119 MMHG | WEIGHT: 145 LBS | RESPIRATION RATE: 16 BRPM

## 2022-03-23 PROCEDURE — 99212 OFFICE O/P EST SF 10 MIN: CPT

## 2022-03-23 RX ORDER — AMOXICILLIN AND CLAVULANATE POTASSIUM 875; 125 MG/1; MG/1
875-125 TABLET, COATED ORAL
Qty: 60 | Refills: 1 | Status: DISCONTINUED | COMMUNITY
Start: 2021-09-01 | End: 2022-03-23

## 2022-03-23 NOTE — HISTORY OF PRESENT ILLNESS
[FreeTextEntry1] : Off Abx x 3 months\par No complaint in foot/toes\par Has resumed all normal athletic activities without discomfort.\par No redness/swelling of toe

## 2022-03-23 NOTE — PHYSICAL EXAM
[General Appearance - Alert] : alert [General Appearance - In No Acute Distress] : in no acute distress [General Appearance - Well Nourished] : well nourished [General Appearance - Well-Appearing] : healthy appearing [FreeTextEntry1] : Hallux deformity of large toe; other toes curled plantar direction. +callus on tips of 3rd and 4th toes, some ulceration of callus. No erythema, no tenderness

## 2022-03-23 NOTE — ASSESSMENT
[FreeTextEntry1] : Doing well 3 months off antibiotics. Stable imaging. \par Advised podiatric eval to protect toes given unusual curvature.\par Repeat X-Ray and visit in 3 months.

## 2022-04-12 ENCOUNTER — NON-APPOINTMENT (OUTPATIENT)
Age: 72
End: 2022-04-12

## 2022-04-19 ENCOUNTER — NON-APPOINTMENT (OUTPATIENT)
Age: 72
End: 2022-04-19

## 2022-04-19 ENCOUNTER — APPOINTMENT (OUTPATIENT)
Dept: CARDIOLOGY | Facility: CLINIC | Age: 72
End: 2022-04-19
Payer: COMMERCIAL

## 2022-04-19 VITALS
SYSTOLIC BLOOD PRESSURE: 124 MMHG | HEART RATE: 55 BPM | BODY MASS INDEX: 20.62 KG/M2 | HEIGHT: 70 IN | RESPIRATION RATE: 17 BRPM | DIASTOLIC BLOOD PRESSURE: 66 MMHG | WEIGHT: 144 LBS | OXYGEN SATURATION: 100 %

## 2022-04-19 PROCEDURE — 93000 ELECTROCARDIOGRAM COMPLETE: CPT

## 2022-04-19 PROCEDURE — 99214 OFFICE O/P EST MOD 30 MIN: CPT

## 2022-04-19 NOTE — PHYSICAL EXAM
[General Appearance - Well Developed] : well developed [General Appearance - Well Nourished] : well nourished [General Appearance - In No Acute Distress] : no acute distress [Normal Conjunctiva] : the conjunctiva exhibited no abnormalities [No Oral Pallor] : no oral pallor [Normal Jugular Venous V Waves Present] : normal jugular venous V waves present [Respiration, Rhythm And Depth] : normal respiratory rhythm and effort [Auscultation Breath Sounds / Voice Sounds] : lungs were clear to auscultation bilaterally [Heart Rate And Rhythm] : heart rate and rhythm were normal [Heart Sounds] : normal S1 and S2 [Arterial Pulses Normal] : the arterial pulses were normal [Edema] : no peripheral edema present [Bowel Sounds] : normal bowel sounds [Abdomen Soft] : soft [Abnormal Walk] : normal gait [Cyanosis, Localized] : no localized cyanosis [Skin Turgor] : normal skin turgor [Oriented To Time, Place, And Person] : oriented to person, place, and time [Impaired Insight] : insight and judgment were intact [Affect] : the affect was normal

## 2022-04-19 NOTE — HISTORY OF PRESENT ILLNESS
[FreeTextEntry1] : Ran 2 half marathons in the past 4 months. Runs a 10 to 15 minute mile.\par No chest pains or dyspnea.\par \par \par Prior:\par He feels okay. Runs and bicycles as before. Feels fine.\par No SOB.\par No palpitations.\par No near syncope\par Tolerating aspirin/statin.\par \par Prior:\par Thank you for referring him for cardiovascular evaluation prior to his planned wedge resection. 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 He underwent left lower lobectomy in August of 2017 without any difficulty. Approximately 12 days after the surgery he did have a syncopal episode after walking a 5K event. He underwent chemotherapy and reports feeling well overall. His exercise capacity has been stable throughout this time.. He denies any exertional shortness of breath or exertional chest pains. He does note some vague chest pains that occur randomly and are difficulty to describe.\par He has been treated for hyperlipidemia for many years with a statin and undergoes routine exercise stress testing at his internist's office. The most recent one was March of 2017. He was referred today because of an abnormal ECG.\par He exercises routinely including approximately running for 20 miles a week and bicycling for approximately 100 miles a week. He partakes in a state wide 400+ mile, 7 day race once a year.\par He denies any history of diabetes mellitus, hypertension, smoking or family history of premature coronary artery disease.\par He does have artery calcification mentioned as an aside on his chest CT. I reviewed these images and there is a very small area in the circumflex and RCA regions that might be consistent with coronary calcification.\par

## 2022-04-19 NOTE — CARDIOLOGY SUMMARY
[de-identified] : 1/15/18:\par He exercised for 11 minutes and 35 seconds of Jim protocol with no ECG changes or echocardiographic evidence of ischemia. His baseline echocardiogram shows mild to moderate mitral regurgitation and moderate pulmonic regurgitation with pulmonary pressures.\par He may proceed with the planned surgery.\par

## 2022-05-10 ENCOUNTER — APPOINTMENT (OUTPATIENT)
Dept: THORACIC SURGERY | Facility: CLINIC | Age: 72
End: 2022-05-10
Payer: COMMERCIAL

## 2022-05-10 ENCOUNTER — APPOINTMENT (OUTPATIENT)
Dept: CARDIOLOGY | Facility: CLINIC | Age: 72
End: 2022-05-10
Payer: COMMERCIAL

## 2022-05-10 VITALS
SYSTOLIC BLOOD PRESSURE: 121 MMHG | BODY MASS INDEX: 20.76 KG/M2 | OXYGEN SATURATION: 97 % | WEIGHT: 145 LBS | HEIGHT: 70 IN | HEART RATE: 66 BPM | DIASTOLIC BLOOD PRESSURE: 66 MMHG

## 2022-05-10 PROCEDURE — 99214 OFFICE O/P EST MOD 30 MIN: CPT

## 2022-05-10 PROCEDURE — 93306 TTE W/DOPPLER COMPLETE: CPT

## 2022-05-12 NOTE — ASSESSMENT
[FreeTextEntry1] : Mr. Groves is a 71 year old male who presents today for follow up. \par \par He is s/p Left VATS, LLLobetomy for T1N2 mixed papillary and acinar adenocarcinoma on 8/7/17, completed adjuvant Cisplatin/Pemetrexed.\par \par Most recently, he is s/p uniportal Right VATS, RUL Posterior Segmentectomy and MLND on 1/17/18 for a T1bN0 acinar predominant adenocarcinoma.\par \par I have independently reviewed the medical records and imaging at the time of this office consultation, and discussed the following interpretations and recommendations with the patient:\par - Return to office for follow up with CT Chest in 6 months\par Recommendations reviewed with patient during this office visit, and all questions answered; Patient instructed on the importance of follow up and verbalizes understanding.\par \par I personally performed the services described in the documentation, reviewed the documentation recorded by the scribe in my presence and it accurately and completely records my words and actions.\par \par I, SAVI Puentes, am scribing for and in the presence of HAYDEN Longoria, the following sections HISTORY OF PRESENT ILLNESS, PAST MEDICAL/FAMILY/SOCIAL HISTORY; REVIEW OF SYSTEMS; VITAL SIGNS; PHYSICAL EXAM; DISPOSITION.\par

## 2022-05-12 NOTE — CONSULT LETTER
[Dear  ___] : Dear  [unfilled], [Courtesy Letter:] : I had the pleasure of seeing your patient, [unfilled], in my office today. [Please see my note below.] : Please see my note below. [Sincerely,] : Sincerely, [FreeTextEntry2] : Dr. Awad (Onc)\par Dr. Anthony Bull (Pulm)\par Dr. Namrata Fernandez (PCP)\par Dr. Jay Lisker (Cardio) \par  [FreeTextEntry3] : Alberto Trent MD, FACS \par Chief, Division of Thoracic Surgery \par Director, Minimally Invasive Thoracic Surgery \par Department of Cardiovascular and Thoracic Surgery \par [facility] \par , Cardiovascular and Thoracic Surgery\par \par \par

## 2022-05-12 NOTE — HISTORY OF PRESENT ILLNESS
[FreeTextEntry1] : Mr. Groves is a 71 year old male who presents today for follow up. \par \par He is s/p Left VATS, LLLobetomy for T1N2 mixed papillary and acinar adenocarcinoma on 8/7/17, completed adjuvant Cisplatin/Pemetrexed.\par \par Most recently, he is s/p uniportal Right VATS, RUL Posterior Segmentectomy and MLND on 1/17/18 for a T1bN0 acinar predominant adenocarcinoma.\par \par Of note 10/2021 visit  He reported following with ID for cellulitis/osteomyelitis of 4th toe on left foot.\par \par CT Chest on 7/12/19 revealed:\par - RUL 3 mm nodule, stable\par - No evidence of recurrent tumor or metastatic disease\par \par CT chest on 3/17/2020: \par - No suspicious adenopathy or nodules developed. \par \par CT chest on 9/29/2020:\par -No suspicious, new or enlarging pulmonary nodules. \par \par CT Chest on 4/6/21:\par - Stable post op changes\par \par CT Chest on 10/4/21: \par - Stable postsurgical changes, no suspicious new lesion\par \par CT Chest on 4/11/22:\par - Stable post op changes\par \par Patient presents to office for follow up. He denies any fever, chills, cough, shortness of breath, chest pain, hemoptysis.

## 2022-06-29 ENCOUNTER — APPOINTMENT (OUTPATIENT)
Dept: INFECTIOUS DISEASE | Facility: CLINIC | Age: 72
End: 2022-06-29
Payer: COMMERCIAL

## 2022-06-29 ENCOUNTER — NON-APPOINTMENT (OUTPATIENT)
Age: 72
End: 2022-06-29

## 2022-06-29 VITALS
DIASTOLIC BLOOD PRESSURE: 66 MMHG | HEIGHT: 70 IN | TEMPERATURE: 97.9 F | BODY MASS INDEX: 20.33 KG/M2 | HEART RATE: 56 BPM | OXYGEN SATURATION: 97 % | WEIGHT: 142 LBS | SYSTOLIC BLOOD PRESSURE: 113 MMHG

## 2022-06-29 PROCEDURE — 99212 OFFICE O/P EST SF 10 MIN: CPT

## 2022-06-29 NOTE — PHYSICAL EXAM
[FreeTextEntry1] : Left 4th toe with normal coloring, no swelling, no tenderness to deep palpation, no expressible discharge. Normal nail growth

## 2022-06-29 NOTE — HISTORY OF PRESENT ILLNESS
[FreeTextEntry1] : Now off therapy x 6 months\par Has resumed full activities\par No pain, swelling or redness of toe. \par No systemic complaints.\par seeing podiatrist about other hammer toes. No interventions planned

## 2022-06-29 NOTE — ASSESSMENT
[FreeTextEntry1] : Doing well s/p treatment for left 4th toe traumatic OM\par To continue to monitor for S/Sx of recurrence.\par Otherwise, return visit in 6 months with X-ray for final evaluation 1 year after treatment.

## 2022-07-13 NOTE — ED ADULT TRIAGE NOTE - NS ED NOTE AC HIGH RISK COUNTRIES
No
Follow up with your pediatrician for a post hospital visit within 48 hours taking all results from the ER to be reviewed , You can keep the splint on for comfort purposes.  Set up follow up to see a hand specialist, number for Dr. Dorsey above, you can call to make the appointment.   If any worsening, concerning  or new signs or symptoms return to the ER

## 2022-07-25 ENCOUNTER — NON-APPOINTMENT (OUTPATIENT)
Age: 72
End: 2022-07-25

## 2022-09-29 ENCOUNTER — APPOINTMENT (OUTPATIENT)
Dept: UROLOGY | Facility: CLINIC | Age: 72
End: 2022-09-29

## 2022-09-29 VITALS
WEIGHT: 142 LBS | SYSTOLIC BLOOD PRESSURE: 135 MMHG | HEIGHT: 70 IN | DIASTOLIC BLOOD PRESSURE: 85 MMHG | OXYGEN SATURATION: 98 % | HEART RATE: 64 BPM | BODY MASS INDEX: 20.33 KG/M2

## 2022-09-29 DIAGNOSIS — N52.9 MALE ERECTILE DYSFUNCTION, UNSPECIFIED: ICD-10-CM

## 2022-09-29 PROCEDURE — 99214 OFFICE O/P EST MOD 30 MIN: CPT

## 2022-09-29 NOTE — ASSESSMENT
[FreeTextEntry1] : 70 yo M with BPH with LUTS. Not bothersome. PVR acceptable. WIll observe.\par For prostate cancer screening, RUDY is benign and PSA wnl. Can repeat in 1 year. \par For ED, offered PDE5i, but pt declines.\par Can RTO in 1 year or sooner prn

## 2022-09-29 NOTE — HISTORY OF PRESENT ILLNESS
[FreeTextEntry1] : 65 yo M, seen 9/2017,  after he underwent VATS for lung lesion 8/7 at [facility], unable to void in PACU. He passed his TOV last visit. Was started on flomax, but had episode of orthostasis which resulted in fall and breaking of his nose. He was instructed to stop alpha blocker. Patient reports LUTS, with diminished stream, hesitancy, mild straining, and nocturia 4x/night. No hematuria, no dysuria, no incontinence.  Uroflow qMax 8 ml/s, vv 60 ml, PVR 31 mL. Of note, pt was diagnosed with adenocarcinoma of lung and will be undergoing chemotherapy. \par \par 01/21/2020: Patient presents for follow up. He reports  symptoms continue with weak stream, ugency, nocturia. Of note, he had multple surgical procedures since last visit, and did not have urinary retention again. No hematuria, no dysuria, no hesitancy, no straining. No incontinence. No fevers, no chills, no nausea, no vomiting, no flank pain. \par \par 09/29/2022: Patient presents for follow up. He is not on any medications for BPH or ED at this time. he reports some urinary frequency, nocturia 1-2x/night. nothing bothersome. He does report poor erections. PSA 3/2022 was 1.5

## 2022-09-29 NOTE — PHYSICAL EXAM
[General Appearance - Well Developed] : well developed [General Appearance - Well Nourished] : well nourished [Normal Appearance] : normal appearance [Well Groomed] : well groomed [General Appearance - In No Acute Distress] : no acute distress [Abdomen Soft] : soft [Abdomen Tenderness] : non-tender [Costovertebral Angle Tenderness] : no ~M costovertebral angle tenderness [Urethral Meatus] : meatus normal [Urinary Bladder Findings] : the bladder was normal on palpation [Scrotum] : the scrotum was normal [Testes Mass (___cm)] : there were no testicular masses [Edema] : no peripheral edema [] : no respiratory distress [Respiration, Rhythm And Depth] : normal respiratory rhythm and effort [Exaggerated Use Of Accessory Muscles For Inspiration] : no accessory muscle use [Oriented To Time, Place, And Person] : oriented to person, place, and time [Affect] : the affect was normal [Mood] : the mood was normal [Not Anxious] : not anxious [Normal Station and Gait] : the gait and station were normal for the patient's age [No Focal Deficits] : no focal deficits [No Palpable Adenopathy] : no palpable adenopathy [No Prostate Nodules] : no prostate nodules [Prostate Size ___ gm] : prostate size [unfilled] gm

## 2022-10-03 LAB
APPEARANCE: CLEAR
BACTERIA UR CULT: NORMAL
BACTERIA: NEGATIVE
BILIRUBIN URINE: NEGATIVE
BLOOD URINE: NEGATIVE
COLOR: YELLOW
GLUCOSE QUALITATIVE U: NEGATIVE
HYALINE CASTS: 0 /LPF
KETONES URINE: NEGATIVE
LEUKOCYTE ESTERASE URINE: NEGATIVE
MICROSCOPIC-UA: NORMAL
NITRITE URINE: NEGATIVE
PH URINE: 6
PROTEIN URINE: NEGATIVE
RED BLOOD CELLS URINE: 0 /HPF
SPECIFIC GRAVITY URINE: 1.02
SQUAMOUS EPITHELIAL CELLS: 0 /HPF
UROBILINOGEN URINE: NORMAL
WHITE BLOOD CELLS URINE: 0 /HPF

## 2022-10-28 NOTE — DIETITIAN INITIAL EVALUATION ADULT. - FLUIDS
[Nutrition/ Exercise/ Weight Management] : nutrition, exercise, weight management [Breast Self Exam] : breast self exam [Bladder Hygiene] : bladder hygiene [id number]

## 2022-11-15 ENCOUNTER — APPOINTMENT (OUTPATIENT)
Dept: THORACIC SURGERY | Facility: CLINIC | Age: 72
End: 2022-11-15

## 2022-11-15 VITALS
DIASTOLIC BLOOD PRESSURE: 74 MMHG | WEIGHT: 142 LBS | OXYGEN SATURATION: 99 % | SYSTOLIC BLOOD PRESSURE: 145 MMHG | HEIGHT: 70 IN | RESPIRATION RATE: 14 BRPM | HEART RATE: 48 BPM | BODY MASS INDEX: 20.33 KG/M2

## 2022-11-15 DIAGNOSIS — I70.90 UNSPECIFIED ATHEROSCLEROSIS: ICD-10-CM

## 2022-11-15 PROCEDURE — 99214 OFFICE O/P EST MOD 30 MIN: CPT

## 2022-11-15 NOTE — CONSULT LETTER
[Dear  ___] : Dear  [unfilled], [Courtesy Letter:] : I had the pleasure of seeing your patient, [unfilled], in my office today. [Please see my note below.] : Please see my note below. [Sincerely,] : Sincerely, [FreeTextEntry2] : Dr. Awad (Onc)\par Dr. Anthony Bull (Pulm)\par Dr. Namrata Fernandez (PCP)\par Dr. Jay Lisker (Cardio) \par  [FreeTextEntry3] : Alberto Trent MD, FACS \par Chief, Division of Thoracic Surgery \par Director, Minimally Invasive Thoracic Surgery \par Department of Cardiovascular and Thoracic Surgery \par [facility] \par , Cardiovascular and Thoracic Surgery\par \par \par

## 2022-11-15 NOTE — ASSESSMENT
[FreeTextEntry1] : Mr. Groves is a 71 year old male who presents today for follow up. \par \par He is s/p Left VATS, LLLobetomy for T1N2 mixed papillary and acinar adenocarcinoma on 8/7/17, completed adjuvant Cisplatin/Pemetrexed.\par \par He is s/p uniportal Right VATS, RUL Posterior Segmentectomy and MLND on 1/17/18 for a T1bN0 acinar predominant adenocarcinoma.\par \par I have reviewed the patient's medical records and diagnostic images at time of this office consultation and have made the following recommendation:\par 1. CT scan showed no evidence of recurrence, recommended patient to return to office in 6 months with CT Chest w/out contrast. \par \par \par I, HAYDEN Longoria, personally performed the evaluation and management (E/M) services for this established patient who follow up today with an existing condition.  That E/M includes conducting the examination, assessing all new/exacerbated/existing conditions, and establishing a plan of care.  Today, my ACP, DAVIDA ConklinC, was here to observe my evaluation and management services for this existing condition to be followed going forward.

## 2023-01-11 ENCOUNTER — APPOINTMENT (OUTPATIENT)
Dept: INFECTIOUS DISEASE | Facility: CLINIC | Age: 73
End: 2023-01-11
Payer: COMMERCIAL

## 2023-01-11 VITALS
OXYGEN SATURATION: 95 % | TEMPERATURE: 97.6 F | DIASTOLIC BLOOD PRESSURE: 70 MMHG | HEART RATE: 63 BPM | SYSTOLIC BLOOD PRESSURE: 113 MMHG | HEIGHT: 70 IN | WEIGHT: 142 LBS | BODY MASS INDEX: 20.33 KG/M2

## 2023-01-11 DIAGNOSIS — M86.9 OSTEOMYELITIS, UNSPECIFIED: ICD-10-CM

## 2023-01-11 PROCEDURE — 99212 OFFICE O/P EST SF 10 MIN: CPT

## 2023-01-11 NOTE — HISTORY OF PRESENT ILLNESS
[FreeTextEntry1] : Now 1 year s/p completion of antibiotics for 4th toe OM\par No complaints at 4th toe.\par Ran 10 miles yesterday without discomfort.\par No redness, swelling, drainage from toe.\par Seeing podiatry for hallux valgus deformity and hammertoes. \par

## 2023-01-11 NOTE — ASSESSMENT
[FreeTextEntry1] : 12 months since completion of antibiotics without evidence of residual or relapsed disease.\par Advised continued close follow-up with podiatry to address hammertoes as patient at high risk for breakdown and further infectious complications with current configuration. Did not advise surgical intervention, but rather mechanical interventions to striaghten/offload toes, especially during running.\par \par F/U PRN only

## 2023-01-11 NOTE — DATA REVIEWED
[FreeTextEntry1] : 1/5/23 X-Ray (CD reviewed, returned to patient)\par No evidence of cortical erosion in area of 4th toe OM. Tip of 3rd toe sclerotic without other abnormalities (no soft tissue swelling, gas, cortical erosions)\par

## 2023-01-11 NOTE — PHYSICAL EXAM
[FreeTextEntry1] : Hallux deformity of left foot with erythematous skin, non-tender, no breakdown. 2nd/3rd toes with significant hammertoe deformity, superficial ulceration with eschar on 3rd toe, no erythema, drainage. 4th toe normal appearance, non-tender, no expressible drainage.

## 2023-01-20 ENCOUNTER — APPOINTMENT (OUTPATIENT)
Dept: ORTHOPEDIC SURGERY | Facility: CLINIC | Age: 73
End: 2023-01-20
Payer: COMMERCIAL

## 2023-01-20 VITALS — BODY MASS INDEX: 20.33 KG/M2 | HEIGHT: 70 IN | WEIGHT: 142 LBS

## 2023-01-20 DIAGNOSIS — S29.9XXA UNSPECIFIED INJURY OF THORAX, INITIAL ENCOUNTER: ICD-10-CM

## 2023-01-20 PROCEDURE — 71100 X-RAY EXAM RIBS UNI 2 VIEWS: CPT | Mod: RT

## 2023-01-20 PROCEDURE — 99203 OFFICE O/P NEW LOW 30 MIN: CPT

## 2023-01-20 PROCEDURE — 99213 OFFICE O/P EST LOW 20 MIN: CPT

## 2023-01-20 NOTE — PHYSICAL EXAM
[] : no swelling [Right] : right rib [No bony abnormalities] : No bony abnormalities [FreeTextEntry8] : tender anterior axillary line 4-7 ribs, no crepitus, fair excursion of chest to inspiration

## 2023-01-20 NOTE — HISTORY OF PRESENT ILLNESS
[Sudden] : sudden [Dull/Aching] : dull/aching [de-identified] : Had a hard fall on hiking path, landed on right side. Has persistent pain right ribs, feels a bit of a click at times. Had h/o partial lobectomy of both lungs done laparoscopically.  [] : no [FreeTextEntry1] : ches/right rib  [FreeTextEntry3] : 1/19/22 [FreeTextEntry5] : patient was injured when he fell while running

## 2023-01-20 NOTE — DISCUSSION/SUMMARY
[de-identified] : I don't see definitive rib fracture. May have occult fx, or disturbed a costosternal joint. Rest, ice, deep breathing hourly while awake. Activity modifications: limit bending, lifting, twisting

## 2023-01-27 NOTE — ED PROVIDER NOTE - CROS ED ROS STATEMENT
Addended by: Aryan Sosa on: 1/27/2023 04:53 PM     Modules accepted: Orders
all other ROS negative except as per HPI

## 2023-02-13 ENCOUNTER — APPOINTMENT (OUTPATIENT)
Dept: ORTHOPEDIC SURGERY | Facility: CLINIC | Age: 73
End: 2023-02-13
Payer: COMMERCIAL

## 2023-02-13 PROCEDURE — 99213 OFFICE O/P EST LOW 20 MIN: CPT

## 2023-02-13 PROCEDURE — 71100 X-RAY EXAM RIBS UNI 2 VIEWS: CPT | Mod: RT

## 2023-02-13 NOTE — HISTORY OF PRESENT ILLNESS
[Sudden] : sudden [0] : 0 [Dull/Aching] : dull/aching [Occasional] : occasional [de-identified] : Had a hard fall on hiking path, landed on right side. Has persistent pain right ribs, feels a bit of a click at times. Had h/o partial lobectomy of both lungs done laparoscopically.  [] : no [FreeTextEntry1] : ches/right rib  [FreeTextEntry3] : 1/19/22 [FreeTextEntry5] : patient was injured when he fell while running

## 2023-02-13 NOTE — DISCUSSION/SUMMARY
[de-identified] : Most likely contusion, may have had costosternal sprain. May take 6-8 weeks to heal. Activity modifications discussed

## 2023-02-13 NOTE — PHYSICAL EXAM
[Right] : right rib [No bony abnormalities] : No bony abnormalities [] : no swelling [FreeTextEntry8] : mildly tender anterior axillary line 4-7 ribs, no crepitus [FreeTextEntry5] : negative

## 2023-05-16 ENCOUNTER — APPOINTMENT (OUTPATIENT)
Dept: THORACIC SURGERY | Facility: CLINIC | Age: 73
End: 2023-05-16
Payer: COMMERCIAL

## 2023-05-16 VITALS
DIASTOLIC BLOOD PRESSURE: 70 MMHG | HEIGHT: 70 IN | SYSTOLIC BLOOD PRESSURE: 121 MMHG | OXYGEN SATURATION: 98 % | WEIGHT: 140 LBS | BODY MASS INDEX: 20.04 KG/M2 | HEART RATE: 50 BPM

## 2023-05-16 PROCEDURE — 99214 OFFICE O/P EST MOD 30 MIN: CPT

## 2023-05-16 NOTE — CONSULT LETTER
[Dear  ___] : Dear  [unfilled], [Courtesy Letter:] : I had the pleasure of seeing your patient, [unfilled], in my office today. [Please see my note below.] : Please see my note below. [Sincerely,] : Sincerely, [FreeTextEntry2] : Dr. Awad (Onc)\par Dr. Anthony Bull (Pulm)\par Dr. Namrata Fernandez (PCP)\par Dr. Jay Lisker (Cardio) \par  [FreeTextEntry3] : Alberto Trent MD, FACS \par Chief, Division of Thoracic Surgery \par Director, Minimally Invasive Thoracic Surgery \par Department of Cardiovascular and Thoracic Surgery \par [facility] \par , Cardiovascular and Thoracic Surgery\par \par \par

## 2023-05-16 NOTE — HISTORY OF PRESENT ILLNESS
[FreeTextEntry1] : Mr. Groves is a 72 year old male who presents today for follow up. \par \par He is s/p Left VATS, LLLobetomy for T1N2 mixed papillary and acinar adenocarcinoma on 8/7/17, completed adjuvant Cisplatin/Pemetrexed.\par \par He is s/p uniportal Right VATS, RUL Posterior Segmentectomy and MLND on 1/17/18 for a T1bN0 acinar predominant adenocarcinoma.\par \par Of note 10/2021 visit  He reported following with ID for cellulitis/osteomyelitis of 4th toe on left foot.\par \par CT Chest on 7/12/19 revealed:\par - RUL 3 mm nodule, stable\par - No evidence of recurrent tumor or metastatic disease\par \par CT chest on 3/17/2020: \par - No suspicious adenopathy or nodules developed. \par \par CT chest on 9/29/2020:\par -No suspicious, new or enlarging pulmonary nodules. \par \par CT Chest on 4/6/21:\par - Stable post op changes\par \par CT Chest on 10/4/21: \par - Stable postsurgical changes, no suspicious new lesion\par \par CT Chest on 4/11/22:\par - Stable post op changes\par \par CT chest on 11/09/2022:\par - Stable postoperative changes from right upper lobe wedge resection and left lower lobectomy. There is no evidence for local tumor recurrence or metastatic disease.\par \par CT Chest on 05/10/2023 ([facility]):\par - S/p RUL wedge resection and left lower lobectomy.\par - There is scarring along the RUL suture line, unchanged.\par - No new or suspicious lung nodule has developed.\par \par \par Patient presents to office for follow up. Overall, he reports to be feeling well. Denies any chest pain, shortness of breath, cough, or hemoptysis.

## 2023-05-16 NOTE — ASSESSMENT
[FreeTextEntry1] : Mr. Groves is a 72 year old male who presents today for follow up. \par \par He is s/p Left VATS, LLLobetomy for T1N2 mixed papillary and acinar adenocarcinoma on 8/7/17, completed adjuvant Cisplatin/Pemetrexed.\par \par He is s/p uniportal Right VATS, RUL Posterior Segmentectomy and MLND on 1/17/18 for a T1bN0 acinar predominant adenocarcinoma.\par \par I have reviewed the patient's medical records and diagnostic images at time of this office consultation and have made the following recommendation:\par 1. CT scan showed no evidence of recurrence, recommended patient to return to office in 6 months with CT Chest w/out contrast. \par 2. Continue follow up with pulm, heme/onc, cards, and pcp.\par \par Recommendations reviewed with patient during this office visit, and all questions answered; Patient instructed on the importance of follow up and verbalizes understanding. \par \par \par I, Dr. SR [facility], personally performed the evaluation and management (E/M) services for this established patient. That E/M includes conducting the examination, assessing all new/exacerbated conditions, and establishing a new plan of care. Today, my ACP, Rick Ashton NP, was here to observe my evaluation and management services for this new problem/exacerbated condition to be followed going forward.\par \par

## 2023-08-07 ENCOUNTER — APPOINTMENT (OUTPATIENT)
Dept: CARDIOLOGY | Facility: CLINIC | Age: 73
End: 2023-08-07
Payer: COMMERCIAL

## 2023-08-07 VITALS
WEIGHT: 134 LBS | DIASTOLIC BLOOD PRESSURE: 70 MMHG | OXYGEN SATURATION: 99 % | HEART RATE: 49 BPM | SYSTOLIC BLOOD PRESSURE: 110 MMHG | BODY MASS INDEX: 19.23 KG/M2

## 2023-08-07 VITALS — DIASTOLIC BLOOD PRESSURE: 68 MMHG | SYSTOLIC BLOOD PRESSURE: 100 MMHG

## 2023-08-07 DIAGNOSIS — I95.1 ORTHOSTATIC HYPOTENSION: ICD-10-CM

## 2023-08-07 DIAGNOSIS — I34.0 NONRHEUMATIC MITRAL (VALVE) INSUFFICIENCY: ICD-10-CM

## 2023-08-07 DIAGNOSIS — R94.31 ABNORMAL ELECTROCARDIOGRAM [ECG] [EKG]: ICD-10-CM

## 2023-08-07 PROCEDURE — 99214 OFFICE O/P EST MOD 30 MIN: CPT | Mod: 25

## 2023-08-07 PROCEDURE — 93000 ELECTROCARDIOGRAM COMPLETE: CPT

## 2023-08-07 RX ORDER — LEVOFLOXACIN 750 MG/1
750 TABLET, FILM COATED ORAL DAILY
Qty: 30 | Refills: 1 | Status: DISCONTINUED | COMMUNITY
Start: 2021-09-01 | End: 2023-08-07

## 2023-08-07 RX ORDER — VALACYCLOVIR 500 MG/1
500 TABLET, FILM COATED ORAL
Refills: 0 | Status: DISCONTINUED | COMMUNITY
End: 2023-08-07

## 2023-08-26 ENCOUNTER — APPOINTMENT (OUTPATIENT)
Dept: ORTHOPEDIC SURGERY | Facility: CLINIC | Age: 73
End: 2023-08-26
Payer: COMMERCIAL

## 2023-08-26 VITALS — BODY MASS INDEX: 19.18 KG/M2 | HEIGHT: 70 IN | WEIGHT: 134 LBS

## 2023-08-26 PROCEDURE — 99203 OFFICE O/P NEW LOW 30 MIN: CPT

## 2023-08-26 PROCEDURE — 71100 X-RAY EXAM RIBS UNI 2 VIEWS: CPT | Mod: RT

## 2023-08-26 PROCEDURE — 73030 X-RAY EXAM OF SHOULDER: CPT | Mod: RT

## 2023-08-26 PROCEDURE — 73000 X-RAY EXAM OF COLLAR BONE: CPT | Mod: RT

## 2023-08-26 PROCEDURE — 73502 X-RAY EXAM HIP UNI 2-3 VIEWS: CPT
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
## 2023-08-27 NOTE — ASSESSMENT
[FreeTextEntry1] : A/P R clavicle fracture and R pelvic fractures - RUE NWB - RLE TTWB - RUE sling - ice - discussed preop for clavicle - discussed orthopedic trauma referral for pelvis fractures. Pt has contact for director of orthopedics [facility] - discussed hospital admission  - f/up dr. andino for clavicle

## 2023-08-27 NOTE — IMAGING
[de-identified] : PE R shoulder: skin intact, +tenderness of clavicle, +deformity, no tenting of skin, AROM not tested, NVI distally. no neck pain. Pelvis stable to stress. PE R hip: difficulty with SLR, painful ROM, no pain with passive motion, NVI distally, calves soft, NT. [No bony abnormalities] : No bony abnormalities [There are no fractures, subluxations or dislocations. No significant abnormalities are seen] : There are no fractures, subluxations or dislocations. No significant abnormalities are seen [Right] : right hip with pelvis [Fracture] : Fracture [FreeTextEntry9] : R inferior pubic ramus and iliac wing fractures [FreeTextEntry3] : R displaced lateral clavicle fracture

## 2023-08-28 ENCOUNTER — APPOINTMENT (OUTPATIENT)
Dept: ORTHOPEDIC SURGERY | Facility: CLINIC | Age: 73
End: 2023-08-28
Payer: COMMERCIAL

## 2023-08-28 ENCOUNTER — NON-APPOINTMENT (OUTPATIENT)
Age: 73
End: 2023-08-28

## 2023-08-28 ENCOUNTER — APPOINTMENT (OUTPATIENT)
Dept: ORTHOPEDIC SURGERY | Facility: CLINIC | Age: 73
End: 2023-08-28

## 2023-08-28 VITALS — WEIGHT: 140 LBS | HEIGHT: 70 IN | BODY MASS INDEX: 20.04 KG/M2

## 2023-08-28 DIAGNOSIS — S32.810A MULTIPLE FRACTURES OF PELVIS WITH STABLE DISRUPTION OF PELVIC RING, INITIAL ENCOUNTER FOR CLOSED FRACTURE: ICD-10-CM

## 2023-08-28 DIAGNOSIS — S42.031A DISPLACED FRACTURE OF LATERAL END OF RIGHT CLAVICLE, INITIAL ENCOUNTER FOR CLOSED FRACTURE: ICD-10-CM

## 2023-08-28 PROCEDURE — 99203 OFFICE O/P NEW LOW 30 MIN: CPT

## 2023-08-29 ENCOUNTER — OUTPATIENT (OUTPATIENT)
Dept: OUTPATIENT SERVICES | Facility: HOSPITAL | Age: 73
LOS: 1 days | End: 2023-08-29
Payer: COMMERCIAL

## 2023-08-29 ENCOUNTER — APPOINTMENT (OUTPATIENT)
Dept: ORTHOPEDIC SURGERY | Facility: CLINIC | Age: 73
End: 2023-08-29

## 2023-08-29 VITALS
DIASTOLIC BLOOD PRESSURE: 58 MMHG | RESPIRATION RATE: 12 BRPM | HEART RATE: 66 BPM | TEMPERATURE: 98 F | OXYGEN SATURATION: 98 % | WEIGHT: 132.06 LBS | HEIGHT: 69 IN | SYSTOLIC BLOOD PRESSURE: 116 MMHG

## 2023-08-29 DIAGNOSIS — Z98.890 OTHER SPECIFIED POSTPROCEDURAL STATES: Chronic | ICD-10-CM

## 2023-08-29 DIAGNOSIS — S42.001A FRACTURE OF UNSPECIFIED PART OF RIGHT CLAVICLE, INITIAL ENCOUNTER FOR CLOSED FRACTURE: ICD-10-CM

## 2023-08-29 DIAGNOSIS — Z90.2 ACQUIRED ABSENCE OF LUNG [PART OF]: Chronic | ICD-10-CM

## 2023-08-29 DIAGNOSIS — Z90.89 ACQUIRED ABSENCE OF OTHER ORGANS: Chronic | ICD-10-CM

## 2023-08-29 DIAGNOSIS — S42.031A DISPLACED FRACTURE OF LATERAL END OF RIGHT CLAVICLE, INITIAL ENCOUNTER FOR CLOSED FRACTURE: ICD-10-CM

## 2023-08-29 DIAGNOSIS — Z98.49 CATARACT EXTRACTION STATUS, UNSPECIFIED EYE: Chronic | ICD-10-CM

## 2023-08-29 LAB
ALBUMIN SERPL ELPH-MCNC: 3.4 G/DL — SIGNIFICANT CHANGE UP (ref 3.3–5)
ALP SERPL-CCNC: 61 U/L — SIGNIFICANT CHANGE UP (ref 30–120)
ALT FLD-CCNC: 51 U/L — SIGNIFICANT CHANGE UP (ref 10–60)
ANION GAP SERPL CALC-SCNC: 3 MMOL/L — LOW (ref 5–17)
AST SERPL-CCNC: 45 U/L — HIGH (ref 10–40)
BILIRUB SERPL-MCNC: 1.1 MG/DL — SIGNIFICANT CHANGE UP (ref 0.2–1.2)
BUN SERPL-MCNC: 20 MG/DL — SIGNIFICANT CHANGE UP (ref 7–23)
CALCIUM SERPL-MCNC: 9.9 MG/DL — SIGNIFICANT CHANGE UP (ref 8.4–10.5)
CHLORIDE SERPL-SCNC: 98 MMOL/L — SIGNIFICANT CHANGE UP (ref 96–108)
CO2 SERPL-SCNC: 35 MMOL/L — HIGH (ref 22–31)
CREAT SERPL-MCNC: 1 MG/DL — SIGNIFICANT CHANGE UP (ref 0.5–1.3)
EGFR: 79 ML/MIN/1.73M2 — SIGNIFICANT CHANGE UP
GLUCOSE SERPL-MCNC: 109 MG/DL — HIGH (ref 70–99)
HCT VFR BLD CALC: 40.1 % — SIGNIFICANT CHANGE UP (ref 39–50)
HGB BLD-MCNC: 13.1 G/DL — SIGNIFICANT CHANGE UP (ref 13–17)
MCHC RBC-ENTMCNC: 32.7 GM/DL — SIGNIFICANT CHANGE UP (ref 32–36)
MCHC RBC-ENTMCNC: 32.8 PG — SIGNIFICANT CHANGE UP (ref 27–34)
MCV RBC AUTO: 100.5 FL — HIGH (ref 80–100)
NRBC # BLD: 0 /100 WBCS — SIGNIFICANT CHANGE UP (ref 0–0)
PLATELET # BLD AUTO: 181 K/UL — SIGNIFICANT CHANGE UP (ref 150–400)
POTASSIUM SERPL-MCNC: 4.6 MMOL/L — SIGNIFICANT CHANGE UP (ref 3.5–5.3)
POTASSIUM SERPL-SCNC: 4.6 MMOL/L — SIGNIFICANT CHANGE UP (ref 3.5–5.3)
PROT SERPL-MCNC: 6.9 G/DL — SIGNIFICANT CHANGE UP (ref 6–8.3)
RBC # BLD: 3.99 M/UL — LOW (ref 4.2–5.8)
RBC # FLD: 13.2 % — SIGNIFICANT CHANGE UP (ref 10.3–14.5)
SODIUM SERPL-SCNC: 136 MMOL/L — SIGNIFICANT CHANGE UP (ref 135–145)
WBC # BLD: 5.3 K/UL — SIGNIFICANT CHANGE UP (ref 3.8–10.5)
WBC # FLD AUTO: 5.3 K/UL — SIGNIFICANT CHANGE UP (ref 3.8–10.5)

## 2023-08-29 PROCEDURE — G0463: CPT

## 2023-08-29 PROCEDURE — 85027 COMPLETE CBC AUTOMATED: CPT

## 2023-08-29 PROCEDURE — 93010 ELECTROCARDIOGRAM REPORT: CPT

## 2023-08-29 PROCEDURE — 36415 COLL VENOUS BLD VENIPUNCTURE: CPT

## 2023-08-29 PROCEDURE — 93005 ELECTROCARDIOGRAM TRACING: CPT

## 2023-08-29 PROCEDURE — 80053 COMPREHEN METABOLIC PANEL: CPT

## 2023-08-29 RX ORDER — MULTIVIT-MIN/FERROUS GLUCONATE 9 MG/15 ML
1 LIQUID (ML) ORAL
Qty: 0 | Refills: 0 | DISCHARGE

## 2023-08-29 RX ORDER — SIMVASTATIN 20 MG/1
1 TABLET, FILM COATED ORAL
Qty: 0 | Refills: 0 | DISCHARGE

## 2023-08-29 RX ORDER — FEXOFENADINE HCL 30 MG
1 TABLET ORAL
Qty: 0 | Refills: 0 | DISCHARGE

## 2023-08-29 NOTE — H&P PST ADULT - MUSCULOSKELETAL
details… no joint erythema/no joint warmth/no calf tenderness/decreased ROM/no chest wall tenderness/extremities exam/decreased strength

## 2023-08-29 NOTE — H&P PST ADULT - NSICDXFAMILYHX_GEN_ALL_CORE_FT
FAMILY HISTORY:  Father  Still living? Yes, Estimated age:   Family history of heart disease, Age at diagnosis: Age Unknown    Mother  Still living? No  Family history of COPD (chronic obstructive pulmonary disease), Age at diagnosis: Age Unknown    Grandparent  Still living? No  Family history of diabetes mellitus, Age at diagnosis: Age Unknown

## 2023-08-29 NOTE — H&P PST ADULT - NSICDXPASTMEDICALHX_GEN_ALL_CORE_FT
PAST MEDICAL HISTORY:  Adenocarcinoma of lung     Benign prostatic hyperplasia with urinary obstruction and other lower urinary tract symptoms     COPD (chronic obstructive pulmonary disease)     Erectile dysfunction of organic origin     Genital herpes     Hearing loss     Herpes     HLD (hyperlipidemia)     Hyperlipidemia, unspecified hyperlipidemia type     Lumbar disc herniation s/p lumbar surgery L4-L5    Neutropenia longstanding, stable, no intervention    Right clavicle fracture     Solitary pulmonary nodule right and left lung    Thyroid nodule found incidentally on CT scan
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
## 2023-08-29 NOTE — H&P PST ADULT - NSICDXPASTSURGICALHX_GEN_ALL_CORE_FT
PAST SURGICAL HISTORY:  H/O Spinal surgery L4-L5 5/2006    History of back surgery     History of cataract extraction     History of lobectomy of lung LLL resection  with LND 8/7/17    History of lobectomy of lung RUL, LLL    History of tonsillectomy     S/P scrotal varicocelectomy two procedures 1985 and 1986

## 2023-08-29 NOTE — H&P PST ADULT - PROBLEM SELECTOR PLAN 1
Open reduction internal fixation right clavicle is planned for 9/1/2023  Diagnostic testing performed  Medical and cardiac clearances requested  Pre op instructions were reviewed with patient and daughter and given in writing  best wishes offered

## 2023-08-29 NOTE — ADDENDUM
[FreeTextEntry1] : I, Alejandra Jung, acted solely as a scribe for Dr. José Luis Richey M.D on 08/28/2023.  All medical record entries made by the Scribe were at my, Dr. José Luis Richey M.D, direction and personally dictated by me on 08/28/2023. I have personally reviewed the chart and agree that the record accurately reflects my personal performance of the history, physical exam, assessment and plan.

## 2023-08-29 NOTE — PHYSICAL EXAM
[de-identified] : Patient is WDWN, alert, and in no acute distress. Breathing is unlabored. He] is grossly oriented to person, place, and time He presents in a wheelchair.  Right shoulder: Ecchymosis, tenderness and obvious deformity of his clavicle. [de-identified] : [facility] of the pelvis revealed a right iliac wing and a right pubic ramus fracture.   X-[facility] done at [facility] of the right shoulder demonstrated a right displaced distal clavicle  fracture.

## 2023-08-29 NOTE — DISCUSSION/SUMMARY
[de-identified] : The underlying pathophysiology was reviewed with the patient. XR films were reviewed with the patient. Discussed at length the nature of the patient's condition. The right shoulder and right hip symptoms are secondary to right clavicle and pubic rami fractures.   At this time, the patient would like to proceed with a right clavicle ORIF at [facility] on Friday 09/1/2023. The details of surgery, treatment alternatives, and the associated risks, complications, limitations, and expectations have been reviewed and discussed with the patient. Risks include, but are not limited to, bleeding, infection, swelling, stiffness, scarring, and need for additional procedures. All questions have been answered. The patient has expressed acceptance and understanding. Consent will be signed on the day of surgery.    The right pubic rami fracture will be treated conservatively. We discussed using a norsi walker or cane for support.  All questions answered, understanding verbalized. Patient in agreement with plan of care. Patient may follow up post operation.

## 2023-08-30 NOTE — HISTORY OF PRESENT ILLNESS
[FreeTextEntry1] : Has been feeling well. Biking 500 miles without difficulty. No chest pain or dyspnea. Recent LDL 89.  Prior: Ran 2 half marathons in the past 4 months. Runs a 10 to 15 minute mile. No chest pains or dyspnea.  Prior: He feels okay. Runs and bicycles as before. Feels fine. No SOB. No palpitations. No near syncope Tolerating aspirin/statin.  Prior: Thank you for referring him for cardiovascular evaluation prior to his planned wedge resection. 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 He underwent left lower lobectomy in August of 2017 without any difficulty. Approximately 12 days after the surgery he did have a syncopal episode after walking a 5K event. He underwent chemotherapy and reports feeling well overall. His exercise capacity has been stable throughout this time.. He denies any exertional shortness of breath or exertional chest pains. He does note some vague chest pains that occur randomly and are difficulty to describe. He has been treated for hyperlipidemia for many years with a statin and undergoes routine exercise stress testing at his internist's office. The most recent one was March of 2017. He was referred today because of an abnormal ECG. He exercises routinely including approximately running for 20 miles a week and bicycling for approximately 100 miles a week. He partakes in a state wide 400+ mile, 7 day race once a year. He denies any history of diabetes mellitus, hypertension, smoking or family history of premature coronary artery disease. He does have artery calcification mentioned as an aside on his chest CT. I reviewed these images and there is a very small area in the circumflex and RCA regions that might be consistent with coronary calcification.

## 2023-08-30 NOTE — DISCUSSION/SUMMARY
[EKG obtained to assist in diagnosis and management of assessed problem(s)] : EKG obtained to assist in diagnosis and management of assessed problem(s) [FreeTextEntry1] : He is a 72-year-old with a history of hyperlipidemia and mild coronary calcification on CT. CAD: LDL at goal. 89. No angina. Tolerating aspirin and statin. Recent normal stress test: will forward to me from PMD. Continue exercise and current regimen. Echo to monitor MR every year or two. He is optimized to proceed with the planned surgery on his shoulder. annual followup.

## 2023-08-30 NOTE — CARDIOLOGY SUMMARY
[de-identified] : 1/15/18:\par  He exercised for 11 minutes and 35 seconds of Jim protocol with no ECG changes or echocardiographic evidence of ischemia. His baseline echocardiogram shows mild to moderate mitral regurgitation and moderate pulmonic regurgitation with pulmonary pressures.\par  He may proceed with the planned surgery.\par

## 2023-08-31 ENCOUNTER — TRANSCRIPTION ENCOUNTER (OUTPATIENT)
Age: 73
End: 2023-08-31

## 2023-08-31 RX ORDER — CHLORHEXIDINE GLUCONATE 213 G/1000ML
1 SOLUTION TOPICAL ONCE
Refills: 0 | Status: DISCONTINUED | OUTPATIENT
Start: 2023-09-01 | End: 2023-09-15

## 2023-09-01 ENCOUNTER — TRANSCRIPTION ENCOUNTER (OUTPATIENT)
Age: 73
End: 2023-09-01

## 2023-09-01 ENCOUNTER — APPOINTMENT (OUTPATIENT)
Dept: ORTHOPEDIC SURGERY | Facility: HOSPITAL | Age: 73
End: 2023-09-01

## 2023-09-01 ENCOUNTER — OUTPATIENT (OUTPATIENT)
Dept: OUTPATIENT SERVICES | Facility: HOSPITAL | Age: 73
LOS: 1 days | End: 2023-09-01
Payer: COMMERCIAL

## 2023-09-01 VITALS
HEART RATE: 65 BPM | WEIGHT: 132.5 LBS | RESPIRATION RATE: 20 BRPM | TEMPERATURE: 98 F | HEIGHT: 69 IN | OXYGEN SATURATION: 100 % | DIASTOLIC BLOOD PRESSURE: 67 MMHG | SYSTOLIC BLOOD PRESSURE: 139 MMHG

## 2023-09-01 VITALS
HEART RATE: 78 BPM | DIASTOLIC BLOOD PRESSURE: 57 MMHG | OXYGEN SATURATION: 97 % | RESPIRATION RATE: 15 BRPM | SYSTOLIC BLOOD PRESSURE: 141 MMHG

## 2023-09-01 DIAGNOSIS — Z90.89 ACQUIRED ABSENCE OF OTHER ORGANS: Chronic | ICD-10-CM

## 2023-09-01 DIAGNOSIS — Z98.49 CATARACT EXTRACTION STATUS, UNSPECIFIED EYE: Chronic | ICD-10-CM

## 2023-09-01 DIAGNOSIS — S42.031A DISPLACED FRACTURE OF LATERAL END OF RIGHT CLAVICLE, INITIAL ENCOUNTER FOR CLOSED FRACTURE: ICD-10-CM

## 2023-09-01 DIAGNOSIS — Z98.890 OTHER SPECIFIED POSTPROCEDURAL STATES: Chronic | ICD-10-CM

## 2023-09-01 DIAGNOSIS — Z90.2 ACQUIRED ABSENCE OF LUNG [PART OF]: Chronic | ICD-10-CM

## 2023-09-01 PROCEDURE — 23515 OPTX CLAVICULAR FX W/INT FIX: CPT | Mod: RT

## 2023-09-01 PROCEDURE — C1713: CPT

## 2023-09-01 PROCEDURE — 23550 OPTX ACROMCLV DISLC AQT/CHRN: CPT | Mod: RT

## 2023-09-01 PROCEDURE — C1889: CPT

## 2023-09-01 PROCEDURE — 76000 FLUOROSCOPY <1 HR PHYS/QHP: CPT

## 2023-09-01 DEVICE — IMPLANTABLE DEVICE: Type: IMPLANTABLE DEVICE | Site: RIGHT | Status: FUNCTIONAL

## 2023-09-01 DEVICE — SCREW LOKG S-T W/ T8 STARDRIVE RECESS 2.7X16MM: Type: IMPLANTABLE DEVICE | Site: RIGHT | Status: FUNCTIONAL

## 2023-09-01 DEVICE — K-WIRE SYNTHES TROCAR POINT 1.25MM X 150MM: Type: IMPLANTABLE DEVICE | Site: RIGHT | Status: FUNCTIONAL

## 2023-09-01 DEVICE — SCREW CORTEX LOKG S-T W/ T8 STARDRIVE RECESS 2.7X14MM: Type: IMPLANTABLE DEVICE | Site: RIGHT | Status: FUNCTIONAL

## 2023-09-01 DEVICE — SCREW CORTEX LOKG S-T W/ T8 STARDRIVE RECESS 2.7X20MM: Type: IMPLANTABLE DEVICE | Site: RIGHT | Status: FUNCTIONAL

## 2023-09-01 DEVICE — SCREW CORTEX LOKG S-T W/ T8 STARDRIVE RECESS 2.7X18MM: Type: IMPLANTABLE DEVICE | Site: RIGHT | Status: FUNCTIONAL

## 2023-09-01 DEVICE — SCREW LOKG S-T W/ T8 STARDRIVE RECESS 2.7X18MM: Type: IMPLANTABLE DEVICE | Site: RIGHT | Status: FUNCTIONAL

## 2023-09-01 DEVICE — SCREW CORTEX LOKG S-T W/ T8 STARDRIVE RECESS 2.7X16MM: Type: IMPLANTABLE DEVICE | Site: RIGHT | Status: FUNCTIONAL

## 2023-09-01 DEVICE — SCREW CORTEX LOKG S-T W/ T8 STARDRIVE RECESS 2.7X22MM: Type: IMPLANTABLE DEVICE | Site: RIGHT | Status: FUNCTIONAL

## 2023-09-01 DEVICE — PLATE LCP CONDYLAR 7H SHAFT 2.7MM: Type: IMPLANTABLE DEVICE | Site: RIGHT | Status: FUNCTIONAL

## 2023-09-01 RX ORDER — IBUPROFEN 200 MG
1 TABLET ORAL
Qty: 30 | Refills: 0
Start: 2023-09-01

## 2023-09-01 RX ORDER — ONDANSETRON 8 MG/1
4 TABLET, FILM COATED ORAL ONCE
Refills: 0 | Status: DISCONTINUED | OUTPATIENT
Start: 2023-09-01 | End: 2023-09-01

## 2023-09-01 RX ORDER — HYDROMORPHONE HYDROCHLORIDE 2 MG/ML
0.5 INJECTION INTRAMUSCULAR; INTRAVENOUS; SUBCUTANEOUS
Refills: 0 | Status: DISCONTINUED | OUTPATIENT
Start: 2023-09-01 | End: 2023-09-01

## 2023-09-01 RX ORDER — OXYCODONE HYDROCHLORIDE 5 MG/1
5 TABLET ORAL ONCE
Refills: 0 | Status: DISCONTINUED | OUTPATIENT
Start: 2023-09-01 | End: 2023-09-01

## 2023-09-01 RX ORDER — SODIUM CHLORIDE 9 MG/ML
1000 INJECTION, SOLUTION INTRAVENOUS
Refills: 0 | Status: DISCONTINUED | OUTPATIENT
Start: 2023-09-01 | End: 2023-09-01

## 2023-09-01 RX ORDER — ASPIRIN/CALCIUM CARB/MAGNESIUM 324 MG
1 TABLET ORAL
Qty: 0 | Refills: 0 | DISCHARGE

## 2023-09-01 RX ORDER — OXYCODONE AND ACETAMINOPHEN 5; 325 MG/1; MG/1
1 TABLET ORAL
Qty: 10 | Refills: 0
Start: 2023-09-01

## 2023-09-01 RX ORDER — VALACYCLOVIR 500 MG/1
1 TABLET, FILM COATED ORAL
Qty: 0 | Refills: 0 | DISCHARGE

## 2023-09-01 RX ORDER — CEFAZOLIN SODIUM 1 G
2000 VIAL (EA) INJECTION ONCE
Refills: 0 | Status: COMPLETED | OUTPATIENT
Start: 2023-09-01 | End: 2023-09-01

## 2023-09-01 RX ADMIN — OXYCODONE HYDROCHLORIDE 5 MILLIGRAM(S): 5 TABLET ORAL at 18:17

## 2023-09-01 RX ADMIN — OXYCODONE HYDROCHLORIDE 5 MILLIGRAM(S): 5 TABLET ORAL at 18:35

## 2023-09-01 NOTE — ASU DISCHARGE PLAN (ADULT/PEDIATRIC) - ASU DC SPECIAL INSTRUCTIONSFT
range of motion as tolerated   non-weight bearing on right arm. range of motion as tolerated   non-weight bearing on right arm.  you may shower with your dressing  sling while ambulating range of motion as tolerated   non-weight bearing on right arm.  you may shower with your dressing  sling while ambulating  do not remove dressing until seen in the office by your surgeon.

## 2023-09-01 NOTE — ASU DISCHARGE PLAN (ADULT/PEDIATRIC) - NS MD DC FALL RISK RISK
For information on Fall & Injury Prevention, visit: [email or web address] OR  [email or web address] OR  https://www.cdc.gov/steadi/patient.html

## 2023-09-01 NOTE — BRIEF OPERATIVE NOTE - NSICDXBRIEFPROCEDURE_GEN_ALL_CORE_FT
PROCEDURES:  Open reduction and internal fixation (ORIF) of fracture of right clavicle 01-Sep-2023 13:14:30  Giancarlo Lundberg

## 2023-09-01 NOTE — ASU DISCHARGE PLAN (ADULT/PEDIATRIC) - CARE PROVIDER_API CALL
José Luis Richey  Orthopaedic Surgery  [address], [address]  [place] [address]  Phone: [phone]  Fax: [phone]  Follow Up Time:

## 2023-09-01 NOTE — ASU PATIENT PROFILE, ADULT - FALL HARM RISK - RISK INTERVENTIONS

## 2023-09-05 PROBLEM — H91.90 UNSPECIFIED HEARING LOSS, UNSPECIFIED EAR: Chronic | Status: ACTIVE | Noted: 2023-08-29

## 2023-09-05 PROBLEM — S42.001A FRACTURE OF UNSPECIFIED PART OF RIGHT CLAVICLE, INITIAL ENCOUNTER FOR CLOSED FRACTURE: Chronic | Status: ACTIVE | Noted: 2023-08-29

## 2023-09-14 ENCOUNTER — APPOINTMENT (OUTPATIENT)
Dept: ORTHOPEDIC SURGERY | Facility: CLINIC | Age: 73
End: 2023-09-14
Payer: COMMERCIAL

## 2023-09-14 VITALS — WEIGHT: 140 LBS | HEIGHT: 70 IN | BODY MASS INDEX: 20.04 KG/M2

## 2023-09-14 PROCEDURE — 99024 POSTOP FOLLOW-UP VISIT: CPT

## 2023-09-14 PROCEDURE — 73000 X-RAY EXAM OF COLLAR BONE: CPT | Mod: RT

## 2023-09-14 PROCEDURE — 73502 X-RAY EXAM HIP UNI 2-3 VIEWS: CPT

## 2023-09-18 ENCOUNTER — APPOINTMENT (OUTPATIENT)
Dept: RADIOLOGY | Facility: CLINIC | Age: 73
End: 2023-09-18
Payer: COMMERCIAL

## 2023-09-18 PROCEDURE — 77085 DXA BONE DENSITY AXL VRT FX: CPT

## 2023-09-19 ENCOUNTER — OFFICE (OUTPATIENT)
Facility: LOCATION | Age: 73
Setting detail: OPHTHALMOLOGY
End: 2023-09-19
Payer: COMMERCIAL

## 2023-09-19 DIAGNOSIS — H00.032: ICD-10-CM

## 2023-09-19 PROCEDURE — 92012 INTRM OPH EXAM EST PATIENT: CPT | Performed by: OPHTHALMOLOGY

## 2023-09-19 ASSESSMENT — REFRACTION_CURRENTRX
OD_VPRISM_DIRECTION: BF
OS_VPRISM_DIRECTION: SV
OS_CYLINDER: -0.25
OS_ADD: +2.75
OS_OVR_VA: 20/
OD_ADD: +2.75
OS_CYLINDER: SPH
OD_OVR_VA: 20/
OD_CYLINDER: -0.25
OD_SPHERE: -0.50
OS_SPHERE: -1.00
OD_SPHERE: -0.50
OS_VPRISM_DIRECTION: BF
OD_CYLINDER: SPH
OD_VPRISM_DIRECTION: SV
OD_OVR_VA: 20/
OD_AXIS: 030
OS_OVR_VA: 20/
OS_AXIS: 014
OS_SPHERE: -1.00

## 2023-09-19 ASSESSMENT — LID EXAM ASSESSMENTS
OS_BLEPHARITIS: LLL LUL 2+
OD_BLEPHARITIS: RLL RUL 2+

## 2023-09-19 ASSESSMENT — REFRACTION_MANIFEST
OD_ADD: +2.50
OS_SPHERE: -1.00
OD_ADD: +2.50
OS_ADD: +2.50
OS_ADD: +2.50
OS_SPHERE: -1.00
OD_SPHERE: -0.50
OD_SPHERE: -0.50

## 2023-09-19 ASSESSMENT — AXIALLENGTH_DERIVED
OS_AL: 24.095
OD_AL: 23.8436

## 2023-09-19 ASSESSMENT — KERATOMETRY
OD_K2POWER_DIOPTERS: 44.00
OD_AXISANGLE_DEGREES: 099
OD_K1POWER_DIOPTERS: 43.50
OS_K1POWER_DIOPTERS: 43.50
OS_AXISANGLE_DEGREES: 007
OS_K2POWER_DIOPTERS: 44.00

## 2023-09-19 ASSESSMENT — REFRACTION_AUTOREFRACTION
OS_CYLINDER: -0.50
OS_AXIS: 096
OD_CYLINDER: -0.25
OS_SPHERE: -1.25
OD_AXIS: 009
OD_SPHERE: -0.75

## 2023-09-19 ASSESSMENT — VISUAL ACUITY
OD_BCVA: 20/20
OS_BCVA: 20/25-

## 2023-09-19 ASSESSMENT — SPHEQUIV_DERIVED
OS_SPHEQUIV: -1.5
OD_SPHEQUIV: -0.875

## 2023-09-19 ASSESSMENT — CONFRONTATIONAL VISUAL FIELD TEST (CVF)
OD_FINDINGS: FULL
OS_FINDINGS: FULL

## 2023-09-21 ENCOUNTER — APPOINTMENT (OUTPATIENT)
Dept: UROLOGY | Facility: CLINIC | Age: 73
End: 2023-09-21
Payer: COMMERCIAL

## 2023-09-21 DIAGNOSIS — N13.8 BENIGN PROSTATIC HYPERPLASIA WITH LOWER URINARY TRACT SYMPMS: ICD-10-CM

## 2023-09-21 DIAGNOSIS — R35.1 NOCTURIA: ICD-10-CM

## 2023-09-21 DIAGNOSIS — N40.1 BENIGN PROSTATIC HYPERPLASIA WITH LOWER URINARY TRACT SYMPMS: ICD-10-CM

## 2023-09-21 DIAGNOSIS — Z12.5 ENCOUNTER FOR SCREENING FOR MALIGNANT NEOPLASM OF PROSTATE: ICD-10-CM

## 2023-09-21 PROCEDURE — 99213 OFFICE O/P EST LOW 20 MIN: CPT | Mod: 25

## 2023-09-21 PROCEDURE — 51798 US URINE CAPACITY MEASURE: CPT

## 2023-09-21 RX ORDER — ALFUZOSIN HYDROCHLORIDE 10 MG/1
10 TABLET, EXTENDED RELEASE ORAL
Qty: 90 | Refills: 3 | Status: ACTIVE | COMMUNITY
Start: 2023-09-21 | End: 1900-01-01

## 2023-09-22 LAB
APPEARANCE: CLEAR
BACTERIA: NEGATIVE /HPF
BILIRUBIN URINE: NEGATIVE
BLOOD URINE: NEGATIVE
CAST: 0 /LPF
COLOR: YELLOW
EPITHELIAL CELLS: 0 /HPF
GLUCOSE QUALITATIVE U: NEGATIVE MG/DL
KETONES URINE: NEGATIVE MG/DL
LEUKOCYTE ESTERASE URINE: NEGATIVE
MICROSCOPIC-UA: NORMAL
NITRITE URINE: NEGATIVE
PH URINE: 6
PROTEIN URINE: NEGATIVE MG/DL
RED BLOOD CELLS URINE: 0 /HPF
SPECIFIC GRAVITY URINE: 1.02
UROBILINOGEN URINE: 0.2 MG/DL
WHITE BLOOD CELLS URINE: 0 /HPF

## 2023-09-26 ENCOUNTER — APPOINTMENT (OUTPATIENT)
Dept: ORTHOPEDIC SURGERY | Facility: CLINIC | Age: 73
End: 2023-09-26
Payer: COMMERCIAL

## 2023-09-26 ENCOUNTER — OFFICE (OUTPATIENT)
Facility: LOCATION | Age: 73
Setting detail: OPHTHALMOLOGY
End: 2023-09-26
Payer: COMMERCIAL

## 2023-09-26 DIAGNOSIS — H01.002: ICD-10-CM

## 2023-09-26 DIAGNOSIS — H00.032: ICD-10-CM

## 2023-09-26 DIAGNOSIS — H26.493: ICD-10-CM

## 2023-09-26 DIAGNOSIS — H01.001: ICD-10-CM

## 2023-09-26 DIAGNOSIS — M95.8 OTHER SPECIFIED ACQUIRED DEFORMITIES OF MUSCULOSKELETAL SYSTEM: ICD-10-CM

## 2023-09-26 DIAGNOSIS — H01.005: ICD-10-CM

## 2023-09-26 DIAGNOSIS — H01.004: ICD-10-CM

## 2023-09-26 LAB — BACTERIA UR CULT: NORMAL

## 2023-09-26 PROCEDURE — 99024 POSTOP FOLLOW-UP VISIT: CPT

## 2023-09-26 PROCEDURE — 99214 OFFICE O/P EST MOD 30 MIN: CPT | Performed by: OPHTHALMOLOGY

## 2023-09-26 PROCEDURE — 73000 X-RAY EXAM OF COLLAR BONE: CPT | Mod: RT

## 2023-09-26 ASSESSMENT — REFRACTION_CURRENTRX
OD_AXIS: 030
OD_SPHERE: -0.50
OD_ADD: +2.75
OD_OVR_VA: 20/
OS_VPRISM_DIRECTION: SV
OS_OVR_VA: 20/
OS_SPHERE: -1.00
OD_VPRISM_DIRECTION: SV
OS_OVR_VA: 20/
OD_SPHERE: -0.50
OD_OVR_VA: 20/
OD_CYLINDER: -0.25
OS_SPHERE: -1.00
OS_VPRISM_DIRECTION: BF
OS_AXIS: 014
OS_ADD: +2.75
OD_VPRISM_DIRECTION: BF
OS_CYLINDER: -0.25
OD_CYLINDER: SPH
OS_CYLINDER: SPH

## 2023-09-26 ASSESSMENT — REFRACTION_MANIFEST
OS_SPHERE: -1.00
OD_SPHERE: -0.50
OD_ADD: +2.50
OD_SPHERE: -0.50
OS_ADD: +2.50
OS_ADD: +2.50
OS_SPHERE: -1.00
OD_ADD: +2.50

## 2023-09-26 ASSESSMENT — KERATOMETRY
OD_K1POWER_DIOPTERS: 43.25
OS_K2POWER_DIOPTERS: 43.50
OD_AXISANGLE_DEGREES: 057
OS_K1POWER_DIOPTERS: 44.25
OS_AXISANGLE_DEGREES: 082
OD_K2POWER_DIOPTERS: 42.75

## 2023-09-26 ASSESSMENT — REFRACTION_AUTOREFRACTION
OD_CYLINDER: -0.75
OS_CYLINDER: -1.00
OS_SPHERE: -0.50
OS_AXIS: 087
OD_AXIS: 075
OD_SPHERE: -0.50

## 2023-09-26 ASSESSMENT — SPHEQUIV_DERIVED
OS_SPHEQUIV: -1
OD_SPHEQUIV: -0.875

## 2023-09-26 ASSESSMENT — CONFRONTATIONAL VISUAL FIELD TEST (CVF)
OD_FINDINGS: FULL
OS_FINDINGS: FULL

## 2023-09-26 ASSESSMENT — LID EXAM ASSESSMENTS
OD_BLEPHARITIS: RLL RUL 2+
OS_BLEPHARITIS: LLL LUL 2+

## 2023-09-26 ASSESSMENT — VISUAL ACUITY
OD_BCVA: 20/20
OS_BCVA: 20/25-

## 2023-09-26 ASSESSMENT — AXIALLENGTH_DERIVED
OD_AL: 24.1284
OS_AL: 23.8465

## 2023-10-01 LAB — PSA SERPL-MCNC: 1.95 NG/ML

## 2023-10-05 ENCOUNTER — OFFICE (OUTPATIENT)
Facility: LOCATION | Age: 73
Setting detail: OPHTHALMOLOGY
End: 2023-10-05
Payer: COMMERCIAL

## 2023-10-05 DIAGNOSIS — H26.491: ICD-10-CM

## 2023-10-05 PROCEDURE — 66821 AFTER CATARACT LASER SURGERY: CPT | Mod: RT | Performed by: OPHTHALMOLOGY

## 2023-10-05 ASSESSMENT — REFRACTION_MANIFEST
OS_ADD: +2.50
OS_SPHERE: -1.00
OD_SPHERE: -0.50
OD_ADD: +2.50
OS_ADD: +2.50
OD_ADD: +2.50
OS_SPHERE: -1.00
OD_SPHERE: -0.50

## 2023-10-05 ASSESSMENT — REFRACTION_CURRENTRX
OD_OVR_VA: 20/
OD_SPHERE: -0.50
OS_SPHERE: -1.00
OS_ADD: +2.75
OD_VPRISM_DIRECTION: SV
OD_ADD: +2.75
OD_AXIS: 030
OS_AXIS: 014
OS_CYLINDER: SPH
OS_OVR_VA: 20/
OS_OVR_VA: 20/
OS_SPHERE: -1.00
OS_VPRISM_DIRECTION: SV
OD_OVR_VA: 20/
OD_VPRISM_DIRECTION: BF
OS_VPRISM_DIRECTION: BF
OD_SPHERE: -0.50
OD_CYLINDER: SPH
OD_CYLINDER: -0.25
OS_CYLINDER: -0.25

## 2023-10-05 ASSESSMENT — AXIALLENGTH_DERIVED
OS_AL: 23.8465
OD_AL: 24.1284

## 2023-10-05 ASSESSMENT — KERATOMETRY
OD_AXISANGLE_DEGREES: 057
OD_K1POWER_DIOPTERS: 43.25
OS_K2POWER_DIOPTERS: 43.50
OD_K2POWER_DIOPTERS: 42.75
OS_K1POWER_DIOPTERS: 44.25
OS_AXISANGLE_DEGREES: 082

## 2023-10-05 ASSESSMENT — LID EXAM ASSESSMENTS
OD_BLEPHARITIS: RLL RUL 2+
OS_BLEPHARITIS: LLL LUL 2+

## 2023-10-05 ASSESSMENT — CONFRONTATIONAL VISUAL FIELD TEST (CVF)
OD_FINDINGS: FULL
OS_FINDINGS: FULL

## 2023-10-05 ASSESSMENT — SPHEQUIV_DERIVED
OS_SPHEQUIV: -1
OD_SPHEQUIV: -0.875

## 2023-10-05 ASSESSMENT — VISUAL ACUITY
OD_BCVA: 20/20
OS_BCVA: 20/25-

## 2023-10-05 ASSESSMENT — REFRACTION_AUTOREFRACTION
OD_AXIS: 075
OS_AXIS: 087
OD_SPHERE: -0.50
OD_CYLINDER: -0.75
OS_CYLINDER: -1.00
OS_SPHERE: -0.50

## 2023-10-12 ENCOUNTER — OFFICE (OUTPATIENT)
Facility: LOCATION | Age: 73
Setting detail: OPHTHALMOLOGY
End: 2023-10-12
Payer: COMMERCIAL

## 2023-10-12 DIAGNOSIS — H26.492: ICD-10-CM

## 2023-10-12 PROCEDURE — 66821 AFTER CATARACT LASER SURGERY: CPT | Mod: 79,LT | Performed by: OPHTHALMOLOGY

## 2023-10-12 ASSESSMENT — REFRACTION_MANIFEST
OD_ADD: +2.50
OS_SPHERE: -1.00
OS_SPHERE: -1.00
OD_SPHERE: -0.50
OD_SPHERE: -0.50
OD_ADD: +2.50
OS_ADD: +2.50
OS_ADD: +2.50

## 2023-10-12 ASSESSMENT — SPHEQUIV_DERIVED
OD_SPHEQUIV: -0.875
OS_SPHEQUIV: -1

## 2023-10-12 ASSESSMENT — REFRACTION_AUTOREFRACTION
OD_CYLINDER: -0.75
OD_AXIS: 075
OD_SPHERE: -0.50
OS_CYLINDER: -1.00
OS_AXIS: 087
OS_SPHERE: -0.50

## 2023-10-12 ASSESSMENT — REFRACTION_CURRENTRX
OD_CYLINDER: SPH
OD_SPHERE: -0.50
OD_VPRISM_DIRECTION: BF
OS_AXIS: 014
OD_OVR_VA: 20/
OD_SPHERE: -0.50
OD_ADD: +2.75
OS_OVR_VA: 20/
OD_CYLINDER: -0.25
OD_VPRISM_DIRECTION: SV
OS_ADD: +2.75
OD_OVR_VA: 20/
OD_AXIS: 030
OS_CYLINDER: -0.25
OS_VPRISM_DIRECTION: SV
OS_SPHERE: -1.00
OS_OVR_VA: 20/
OS_VPRISM_DIRECTION: BF
OS_SPHERE: -1.00
OS_CYLINDER: SPH

## 2023-10-12 ASSESSMENT — KERATOMETRY
OD_K1POWER_DIOPTERS: 43.25
OD_K2POWER_DIOPTERS: 42.75
OD_AXISANGLE_DEGREES: 057
OS_K2POWER_DIOPTERS: 43.50
OS_AXISANGLE_DEGREES: 082
OS_K1POWER_DIOPTERS: 44.25

## 2023-10-12 ASSESSMENT — VISUAL ACUITY
OD_BCVA: 20/20
OS_BCVA: 20/25-

## 2023-10-12 ASSESSMENT — CONFRONTATIONAL VISUAL FIELD TEST (CVF)
OS_FINDINGS: FULL
OD_FINDINGS: FULL

## 2023-10-12 ASSESSMENT — AXIALLENGTH_DERIVED
OS_AL: 23.8465
OD_AL: 24.1284

## 2023-10-12 ASSESSMENT — LID EXAM ASSESSMENTS
OD_BLEPHARITIS: RLL RUL 2+
OS_BLEPHARITIS: LLL LUL 2+

## 2023-10-16 ENCOUNTER — APPOINTMENT (OUTPATIENT)
Dept: ORTHOPEDIC SURGERY | Facility: CLINIC | Age: 73
End: 2023-10-16
Payer: COMMERCIAL

## 2023-10-16 VITALS — WEIGHT: 140 LBS | HEIGHT: 70 IN | BODY MASS INDEX: 20.04 KG/M2

## 2023-10-16 DIAGNOSIS — S32.599A OTHER SPECIFIED FRACTURE OF UNSPECIFIED PUBIS, INITIAL ENCOUNTER FOR CLOSED FRACTURE: ICD-10-CM

## 2023-10-16 PROCEDURE — 99024 POSTOP FOLLOW-UP VISIT: CPT

## 2023-11-28 ENCOUNTER — APPOINTMENT (OUTPATIENT)
Dept: THORACIC SURGERY | Facility: CLINIC | Age: 73
End: 2023-11-28
Payer: COMMERCIAL

## 2023-11-28 PROCEDURE — 99214 OFFICE O/P EST MOD 30 MIN: CPT

## 2024-01-18 ENCOUNTER — APPOINTMENT (OUTPATIENT)
Dept: ORTHOPEDIC SURGERY | Facility: CLINIC | Age: 74
End: 2024-01-18
Payer: COMMERCIAL

## 2024-01-18 VITALS — WEIGHT: 140 LBS | HEIGHT: 70 IN | BODY MASS INDEX: 20.04 KG/M2

## 2024-01-18 PROCEDURE — 99213 OFFICE O/P EST LOW 20 MIN: CPT

## 2024-01-18 PROCEDURE — 73000 X-RAY EXAM OF COLLAR BONE: CPT | Mod: RT

## 2024-01-18 NOTE — DISCUSSION/SUMMARY
[de-identified] :  The underlying pathophysiology was reviewed with the patient. XR films were reviewed with the patient. Discussed at length the nature of the patient's condition.   Patient was advised to use OTC medications and topical analgesic for pain management.   He was advised to move the hand and shoulder as much as possible to improve the ROM and strength.   The patient was encouraged to take Calcium Citrate, Vitamin D3 and Vitamin C along with a high calcium diet is advised to promote bone health and healing.  All questions answered, understanding verbalized. Patient in agreement with plan of care.   Patient was advised to follow up in 6 months.

## 2024-01-18 NOTE — END OF VISIT
[FreeTextEntry3] :  All medical record entries made by the Scribe were at my,  Dr. José Luis Richey MD., direction and personally dictated by me on 01/18/2024. I have personally reviewed the chart and agree that the record accurately reflects my personal performance of the history, physical exam, assessment and plan.

## 2024-01-18 NOTE — HISTORY OF PRESENT ILLNESS
[de-identified] : The patient is a 73 year M who returns after undergoing Open reduction internal fixation of right distal clavicle with stabilization of the clavicle to the coracoid and also supplemental tension band FiberWire suture between the acromion and the clavicle at [facility]. The surgery was on 9/1/2023. The patient was recovering at home. He reported mild postoperative pain, that has improved since his last visit. He reports being able to be active with no pain.    Today, Jan 18, 2024 patient is here for a follow up and further evaluation. He c/p no pain but is concerned with the limited ROM of his shoulder.

## 2024-01-18 NOTE — ADDENDUM
[FreeTextEntry1] :  I, Miladis Sainz wrote this note acting as a scribe for Dr. José Luis Richey on Jan 18, 2024.

## 2024-01-18 NOTE — PHYSICAL EXAM
[de-identified] : Patient is WDWN, alert, and in no acute distress. Breathing is unlabored. He is grossly oriented to person, place, and time.  Right Shoulder:   Inspection/ Palpation: there is no tenderness, swelling, or deformities.   Range of Motion: limited active flexion, passive flexion, abduction, external rotation  Strength: limited forward elevation, internal rotation, external rotation, adduction, and abduction   Stability: no joint instability on provocative testing. [de-identified] : AP, transcapula, and axillary views of the right shoulder were obtained today and revealed intact hardware; Synthes 2.7 plate from the locking mini fragment set with multiple locked and nonlocked screws. Hardware is well aligned. The fracture is healed.

## 2024-02-07 ENCOUNTER — APPOINTMENT (OUTPATIENT)
Dept: ORTHOPEDIC SURGERY | Facility: CLINIC | Age: 74
End: 2024-02-07
Payer: COMMERCIAL

## 2024-02-07 VITALS — WEIGHT: 140 LBS | HEIGHT: 70 IN | BODY MASS INDEX: 20.04 KG/M2

## 2024-02-07 PROCEDURE — 99214 OFFICE O/P EST MOD 30 MIN: CPT

## 2024-02-07 PROCEDURE — 73564 X-RAY EXAM KNEE 4 OR MORE: CPT | Mod: LT

## 2024-02-07 NOTE — HISTORY OF PRESENT ILLNESS
[6] : 6 [5] : 5 [Dull/Aching] : dull/aching [Ice] : ice [de-identified] : 2/7/24: Here for left knee pain x 1 month after running.  [] : no [FreeTextEntry5] : no injury pain for last few months has buckling  [FreeTextEntry1] : l knee [de-identified] : activity

## 2024-02-07 NOTE — IMAGING
[de-identified] : Varus deformity Mild effusion, no warmth, no ecchymosis Medial joint line tenderness to palpation  Range of motion 0-110 with associated crepitus 5/5 quadriceps and hamstring strength Ligamentously stable Motor and sensory intact distally Non antalgic gait Negative Patricio [Left] : left knee [All Views] : anteroposterior, lateral, skyline, and anteroposterior standing [Mild patellofemoral OA] : Mild patellofemoral OA

## 2024-02-07 NOTE — ASSESSMENT
[FreeTextEntry1] : Atraumatic anterior lateral knee pain in this very avid 73-year-old runner.  Currently training for half marathon.  Exam consistent with quadriceps tendinitis and irritation patellofemoral joint.  Anti-inflammatory medication course of physical therapy for VMO strengthening advised.  Could consider corticosteroid injection viscosupplementation in between races.  Follow-up 4 to 6 weeks to reassess   The patient's current medication management of their orthopedic diagnosis was reviewed today:(1) We discussed a comprehensive treatment plan that included pharmaceutical management involving the use of prescription medications. (2) There is a moderate risk of morbidity with further treatment, especially from use of prescription strength medications and possible side effects of these medications which include upset stomach with oral medications, skin reactions to topical medications and cardiac/renal/diabetes issues with long term use. (3) I recommended that the patient follow-up with their medical physician to discuss any significant specific potential issues with long term medication use such as interactions with current medications or with exacerbation of underlying medical comorbidities. (4) The benefits and risks associated with use of injectable, oral or topical, prescription and over the counter anti-inflammatory medications were discussed with the patient. The patient voiced understanding of the risks including but not limited to bleeding, stroke, kidney dysfunction, heart disease, and were referred to the black box warning label for further information.

## 2024-02-09 ENCOUNTER — APPOINTMENT (OUTPATIENT)
Dept: ORTHOPEDIC SURGERY | Facility: CLINIC | Age: 74
End: 2024-02-09

## 2024-02-22 ENCOUNTER — OUTPATIENT (OUTPATIENT)
Dept: OUTPATIENT SERVICES | Facility: HOSPITAL | Age: 74
LOS: 1 days | End: 2024-02-22
Payer: COMMERCIAL

## 2024-02-22 ENCOUNTER — APPOINTMENT (OUTPATIENT)
Dept: NUCLEAR MEDICINE | Facility: CLINIC | Age: 74
End: 2024-02-22
Payer: COMMERCIAL

## 2024-02-22 ENCOUNTER — APPOINTMENT (OUTPATIENT)
Dept: NUCLEAR MEDICINE | Facility: CLINIC | Age: 74
End: 2024-02-22

## 2024-02-22 DIAGNOSIS — Z98.890 OTHER SPECIFIED POSTPROCEDURAL STATES: Chronic | ICD-10-CM

## 2024-02-22 DIAGNOSIS — Z98.49 CATARACT EXTRACTION STATUS, UNSPECIFIED EYE: Chronic | ICD-10-CM

## 2024-02-22 DIAGNOSIS — C80.1 MALIGNANT (PRIMARY) NEOPLASM, UNSPECIFIED: ICD-10-CM

## 2024-02-22 DIAGNOSIS — Z90.2 ACQUIRED ABSENCE OF LUNG [PART OF]: Chronic | ICD-10-CM

## 2024-02-22 DIAGNOSIS — Z90.89 ACQUIRED ABSENCE OF OTHER ORGANS: Chronic | ICD-10-CM

## 2024-02-22 PROCEDURE — 78815 PET IMAGE W/CT SKULL-THIGH: CPT

## 2024-02-22 PROCEDURE — 78815 PET IMAGE W/CT SKULL-THIGH: CPT | Mod: 26,PI

## 2024-02-22 PROCEDURE — A9552: CPT

## 2024-02-27 ENCOUNTER — APPOINTMENT (OUTPATIENT)
Dept: THORACIC SURGERY | Facility: CLINIC | Age: 74
End: 2024-02-27
Payer: COMMERCIAL

## 2024-02-27 VITALS
BODY MASS INDEX: 20.04 KG/M2 | OXYGEN SATURATION: 97 % | HEIGHT: 70 IN | DIASTOLIC BLOOD PRESSURE: 70 MMHG | SYSTOLIC BLOOD PRESSURE: 119 MMHG | HEART RATE: 62 BPM | WEIGHT: 140 LBS | RESPIRATION RATE: 16 BRPM

## 2024-02-27 DIAGNOSIS — C80.1 MALIGNANT (PRIMARY) NEOPLASM, UNSPECIFIED: ICD-10-CM

## 2024-02-27 PROCEDURE — 99213 OFFICE O/P EST LOW 20 MIN: CPT

## 2024-02-27 RX ORDER — VALACYCLOVIR 500 MG/1
TABLET, FILM COATED ORAL
Refills: 0 | Status: ACTIVE | COMMUNITY

## 2024-02-27 NOTE — PHYSICAL EXAM
[] : no respiratory distress [Heart Rate And Rhythm] : heart rate was normal and rhythm regular [Auscultation Breath Sounds / Voice Sounds] : lungs were clear to auscultation bilaterally [Heart Sounds] : normal S1 and S2 [Murmurs] : no murmurs [Heart Sounds Gallop] : no gallops [Heart Sounds Pericardial Friction Rub] : no pericardial rub [Examination Of The Chest] : the chest was normal in appearance [Chest Visual Inspection Thoracic Asymmetry] : no chest asymmetry [Diminished Respiratory Excursion] : normal chest expansion

## 2024-02-28 ENCOUNTER — APPOINTMENT (OUTPATIENT)
Dept: MRI IMAGING | Facility: CLINIC | Age: 74
End: 2024-02-28
Payer: MEDICARE

## 2024-02-28 ENCOUNTER — OUTPATIENT (OUTPATIENT)
Dept: OUTPATIENT SERVICES | Facility: HOSPITAL | Age: 74
LOS: 1 days | Discharge: HOME | End: 2024-02-28

## 2024-02-28 DIAGNOSIS — Z98.890 OTHER SPECIFIED POSTPROCEDURAL STATES: Chronic | ICD-10-CM

## 2024-02-28 DIAGNOSIS — Z90.2 ACQUIRED ABSENCE OF LUNG [PART OF]: Chronic | ICD-10-CM

## 2024-02-28 DIAGNOSIS — Z98.49 CATARACT EXTRACTION STATUS, UNSPECIFIED EYE: Chronic | ICD-10-CM

## 2024-02-28 DIAGNOSIS — C80.1 MALIGNANT (PRIMARY) NEOPLASM, UNSPECIFIED: ICD-10-CM

## 2024-02-28 DIAGNOSIS — Z90.89 ACQUIRED ABSENCE OF OTHER ORGANS: Chronic | ICD-10-CM

## 2024-02-28 PROCEDURE — 74183 MRI ABD W/O CNTR FLWD CNTR: CPT

## 2024-02-28 PROCEDURE — A9585: CPT | Mod: JW

## 2024-02-28 NOTE — CONSULT LETTER
[FreeTextEntry2] : Dr. Awad (Onc)\par  Dr. Anthony Bull (Pulm)\par  Dr. Namrata Fernandez (PCP)\par  Dr. Jay Lisker (Cardio) \par   [FreeTextEntry3] : Alberto Trent MD, FACS \par  Chief, Division of Thoracic Surgery \par  Director, Minimally Invasive Thoracic Surgery \par  Department of Cardiovascular and Thoracic Surgery \par  [facility] \par  , Cardiovascular and Thoracic Surgery\par  \par  \par
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
## 2024-02-28 NOTE — ASSESSMENT
[FreeTextEntry1] : Mr. Groves is a 73 year old male  He is s/p Left VATS, LLLobetomy for T1N2 mixed papillary and acinar adenocarcinoma on 8/7/17, completed adjuvant Cisplatin/Pemetrexed.  He is s/p uniportal Right VATS, RUL Posterior Segmentectomy and MLND on 1/17/18 for a T1bN0 acinar predominant adenocarcinoma.   I have independently reviewed the medical records and imaging at the time of this office consultation, and discussed the following interpretations with the patient: 1. PET CT discussed with patient: punctate focus of increased uptake in the LEFT lobe (SUV 3.8, image 155). Recommended to RTC with MRI of the liver. Follow up in 1-2 weeks to discuss results.  2. Repeat CT chest without contrast in 6 mons for surveillance  Recommendations reviewed with patient during this office visit, and all questions answered; Patient instructed on the importance of follow up and verbalizes understanding.  I, Dr. SR [facility], personally performed the evaluation and management (E/M) services for this established patient who presents today with (a) new problem(s)/exacerbation of (an) existing condition(s).  That E/M includes conducting the examination, assessing all new/exacerbated conditions, and establishing a new plan of care.  Today, my ACP, Meredith Stovall/ MIKEY Haji, was here to observe my evaluation and management services for this new problem/exacerbated condition to be followed going forward.

## 2024-03-03 PROBLEM — K76.9 LIVER LESION: Status: ACTIVE | Noted: 2024-02-27

## 2024-03-03 PROBLEM — R91.8 LUNG NODULES: Status: ACTIVE | Noted: 2017-05-09

## 2024-03-05 ENCOUNTER — APPOINTMENT (OUTPATIENT)
Dept: THORACIC SURGERY | Facility: CLINIC | Age: 74
End: 2024-03-05
Payer: MEDICARE

## 2024-03-05 ENCOUNTER — APPOINTMENT (OUTPATIENT)
Dept: HEMATOLOGY ONCOLOGY | Facility: CLINIC | Age: 74
End: 2024-03-05
Payer: MEDICARE

## 2024-03-05 DIAGNOSIS — R91.8 OTHER NONSPECIFIC ABNORMAL FINDING OF LUNG FIELD: ICD-10-CM

## 2024-03-05 DIAGNOSIS — K76.9 LIVER DISEASE, UNSPECIFIED: ICD-10-CM

## 2024-03-05 DIAGNOSIS — C34.91 MALIGNANT NEOPLASM OF UNSPECIFIED PART OF RIGHT BRONCHUS OR LUNG: ICD-10-CM

## 2024-03-05 PROCEDURE — 99442: CPT

## 2024-03-05 PROCEDURE — G2211 COMPLEX E/M VISIT ADD ON: CPT

## 2024-03-05 PROCEDURE — 99214 OFFICE O/P EST MOD 30 MIN: CPT

## 2024-03-05 NOTE — ASSESSMENT
[FreeTextEntry1] : Mr. Groves is a 73 year old male  He is s/p Left VATS, LLLobetomy for T1N2 mixed papillary and acinar adenocarcinoma on 8/7/17, completed adjuvant Cisplatin/Pemetrexed.  He is s/p uniportal Right VATS, RUL Posterior Segmentectomy and MLND on 1/17/18 for a T1bN0 acinar predominant adenocarcinoma.  Here today with follow up MRI   I have independently reviewed the medical records and imaging at the time of this office consultation, and discussed the following interpretations with the patient: 1. MRI reviewed;  No evidence of a liver lesion. Discussed repeating a non contrast CT Chest in 6 months to re-evaluate lung findings. He is agreeable.  [facility]   Recommendations reviewed with patient during this office visit, and all questions answered; Patient instructed on the importance of follow up and verbalizes understanding.  I, Dr. SR [facility], personally performed the evaluation and management (E/M) services for this established patient. That E/M includes assessing all new/exacerbated conditions, and establishing a new plan of care. Today, My ACP, Meredith Stovall, was here to observe my evaluation and management services for this patient to be followed going forward.
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
## 2024-03-05 NOTE — CONSULT LETTER
[Dear  ___] : Dear  [unfilled], [Courtesy Letter:] : I had the pleasure of seeing your patient, [unfilled], in my office today. [Please see my note below.] : Please see my note below. [Sincerely,] : Sincerely, [FreeTextEntry2] : Dr. Awad (Onc)\par  Dr. Anthony Bull (Pulm)\par  Dr. Namrata Fernandez (PCP)\par  Dr. Jay Lisker (Cardio) \par   [FreeTextEntry3] : Alberto Trent MD, FACS \par  Chief, Division of Thoracic Surgery \par  Director, Minimally Invasive Thoracic Surgery \par  Department of Cardiovascular and Thoracic Surgery \par  [facility] \par  , Cardiovascular and Thoracic Surgery\par  \par  \par

## 2024-03-05 NOTE — DATA REVIEWED
[FreeTextEntry1] : Independently reviewed the following: - PET/CT on 2/22/24 - MR Abdomen w/wo Contrast on 2/28/24

## 2024-03-08 NOTE — DIETITIAN INITIAL EVALUATION ADULT. - +GENDER
Pt reports feeling her heart \"race\" a couple of hours ago. She states this has happened before but never lasts this long. Pt was laying down watching TV when she noticed the symptoms starting. Pt is wearing a holter monitor, placed yesterday, to catch these episodes   Statement Selected

## 2024-03-18 ENCOUNTER — APPOINTMENT (OUTPATIENT)
Dept: ORTHOPEDIC SURGERY | Facility: CLINIC | Age: 74
End: 2024-03-18
Payer: MEDICARE

## 2024-03-18 VITALS — WEIGHT: 140 LBS | BODY MASS INDEX: 20.04 KG/M2 | HEIGHT: 70 IN

## 2024-03-18 DIAGNOSIS — M17.12 UNILATERAL PRIMARY OSTEOARTHRITIS, LEFT KNEE: ICD-10-CM

## 2024-03-18 DIAGNOSIS — M22.2X2 PATELLOFEMORAL DISORDERS, LEFT KNEE: ICD-10-CM

## 2024-03-18 DIAGNOSIS — M22.42 CHONDROMALACIA PATELLAE, LEFT KNEE: ICD-10-CM

## 2024-03-18 PROCEDURE — 99213 OFFICE O/P EST LOW 20 MIN: CPT

## 2024-03-18 NOTE — HISTORY OF PRESENT ILLNESS
[6] : 6 [5] : 5 [Dull/Aching] : dull/aching [Ice] : ice [de-identified] : 2/7/24: Here for left knee pain x 1 month after running.   3/18/24: here to follow up on left knee. Notes slight improvement with PT. Has been running/biking with pain. Tried Meloxicam.  [] : no [FreeTextEntry1] : l knee [FreeTextEntry5] : no injury pain for last few months has buckling  [de-identified] : activity

## 2024-03-18 NOTE — IMAGING
[de-identified] : No effusion, no warmth Anterior tenderness to palpation Range of motion 0-140; anterior pain with flexion; tight hamstrings 5/5 quadriceps and hamstring strength Negative Lachman, negative Blanca, negative patella apprehension Motor and sensory intact distally Non-antalgic gait

## 2024-03-18 NOTE — ASSESSMENT
[FreeTextEntry1] : Reports improvement with meloxicam and PT.  Pain is minimal I do not think he needs any injections at this time.  He will contact me if any new issues arise. Continue running and biking

## 2024-04-01 ENCOUNTER — RX RENEWAL (OUTPATIENT)
Age: 74
End: 2024-04-01

## 2024-04-15 ENCOUNTER — OFFICE (OUTPATIENT)
Facility: LOCATION | Age: 74
Setting detail: OPHTHALMOLOGY
End: 2024-04-15
Payer: COMMERCIAL

## 2024-04-15 DIAGNOSIS — H43.813: ICD-10-CM

## 2024-04-15 DIAGNOSIS — H01.005: ICD-10-CM

## 2024-04-15 DIAGNOSIS — H01.001: ICD-10-CM

## 2024-04-15 DIAGNOSIS — Z96.1: ICD-10-CM

## 2024-04-15 DIAGNOSIS — H01.004: ICD-10-CM

## 2024-04-15 DIAGNOSIS — H01.002: ICD-10-CM

## 2024-04-15 PROCEDURE — 92015 DETERMINE REFRACTIVE STATE: CPT | Performed by: OPHTHALMOLOGY

## 2024-04-15 PROCEDURE — 92201 OPSCPY EXTND RTA DRAW UNI/BI: CPT | Performed by: OPHTHALMOLOGY

## 2024-04-15 PROCEDURE — 92014 COMPRE OPH EXAM EST PT 1/>: CPT | Performed by: OPHTHALMOLOGY

## 2024-04-15 PROCEDURE — 92250 FUNDUS PHOTOGRAPHY W/I&R: CPT | Performed by: OPHTHALMOLOGY

## 2024-04-15 ASSESSMENT — LID EXAM ASSESSMENTS
OS_BLEPHARITIS: LLL LUL 2+
OD_BLEPHARITIS: RLL RUL 2+

## 2024-04-17 PROBLEM — C34.91 ADENOCARCINOMA OF LUNG, RIGHT: Status: ACTIVE | Noted: 2019-04-18

## 2024-04-17 NOTE — REASON FOR VISIT
[Home] : at home, [unfilled] , at the time of the visit. [Medical Office: ([facility])___] : at the medical office located in  [Patient] : the patient [Self] : self [Initial Consultation] : an initial consultation [FreeTextEntry2] : lung cancer

## 2024-04-17 NOTE — REVIEW OF SYSTEMS
[Diarrhea: Grade 0] : Diarrhea: Grade 0 [Negative] : Allergic/Immunologic [FreeTextEntry9] : as above
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
## 2024-04-17 NOTE — REASON FOR VISIT
[Home] : at home, [unfilled] , at the time of the visit. [Medical Office: ([facility])___] : at the medical office located in  [Patient] : the patient [Self] : self [Initial Consultation] : an initial consultation [FreeTextEntry2] : lung cancer
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
## 2024-04-18 NOTE — ASU PATIENT PROFILE, ADULT - AS SC BRADEN FRICTION
-- DO NOT REPLY / DO NOT REPLY ALL --  -- Message is from Engagement Center Operations (ECO) --    ONLY TO BE USED WITHIN A REFILL MEDICATION ENCOUNTER    Med Refill  Is the patient currently having any symptoms?: No/Non-Emergent symptoms    Name of medication requested: See pended med    Is this the first request for the medication in the last 48 hours?: Yes      Patient is requesting a medication refill - medication is on active list    Full name of the provider who ordered the medication: Abel Rodriguez MD     Clinic site name / Account # for provider: [facility]     Preferred Pharmacy: Pharmacy  [facility] [id number] [facility] [address] [place] [address]    Patient confirmed the above pharmacy as correct?  Yes    Caller Information         Type Contact Phone/Fax    04/18/2024 03:45 PM CDT Phone (Incoming) Paul Ta (Self) [phone] (M)            Alternative phone number: n/a     Can a detailed message be left?: Yes         Statement Selected (3) no apparent problem

## 2024-05-31 ENCOUNTER — RX RENEWAL (OUTPATIENT)
Age: 74
End: 2024-05-31

## 2024-05-31 RX ORDER — MELOXICAM 15 MG/1
15 TABLET ORAL DAILY
Qty: 30 | Refills: 1 | Status: ACTIVE | COMMUNITY
Start: 2024-02-07 | End: 1900-01-01

## 2024-06-26 ENCOUNTER — OUTPATIENT (OUTPATIENT)
Dept: OUTPATIENT SERVICES | Facility: HOSPITAL | Age: 74
LOS: 1 days | End: 2024-06-26
Payer: MEDICARE

## 2024-06-26 DIAGNOSIS — Z90.2 ACQUIRED ABSENCE OF LUNG [PART OF]: Chronic | ICD-10-CM

## 2024-06-26 DIAGNOSIS — Z98.890 OTHER SPECIFIED POSTPROCEDURAL STATES: Chronic | ICD-10-CM

## 2024-06-26 DIAGNOSIS — R13.12 DYSPHAGIA, OROPHARYNGEAL PHASE: ICD-10-CM

## 2024-06-26 DIAGNOSIS — D64.89 OTHER SPECIFIED ANEMIAS: ICD-10-CM

## 2024-06-26 DIAGNOSIS — R13.10 DYSPHAGIA, UNSPECIFIED: ICD-10-CM

## 2024-06-26 DIAGNOSIS — Z98.49 CATARACT EXTRACTION STATUS, UNSPECIFIED EYE: Chronic | ICD-10-CM

## 2024-06-26 DIAGNOSIS — Z90.89 ACQUIRED ABSENCE OF OTHER ORGANS: Chronic | ICD-10-CM

## 2024-06-26 DIAGNOSIS — K22.2 ESOPHAGEAL OBSTRUCTION: ICD-10-CM

## 2024-06-26 PROCEDURE — 74220 X-RAY XM ESOPHAGUS 1CNTRST: CPT

## 2024-06-26 PROCEDURE — 74240 X-RAY XM UPR GI TRC 1CNTRST: CPT

## 2024-06-26 PROCEDURE — 74240 X-RAY XM UPR GI TRC 1CNTRST: CPT | Mod: 26

## 2024-07-01 ENCOUNTER — APPOINTMENT (OUTPATIENT)
Dept: ORTHOPEDIC SURGERY | Facility: CLINIC | Age: 74
End: 2024-07-01
Payer: MEDICARE

## 2024-07-01 VITALS — BODY MASS INDEX: 21.47 KG/M2 | HEIGHT: 70 IN | WEIGHT: 150 LBS

## 2024-07-01 DIAGNOSIS — S42.009A FRACTURE OF UNSPECIFIED PART OF UNSPECIFIED CLAVICLE, INITIAL ENCOUNTER FOR CLOSED FRACTURE: ICD-10-CM

## 2024-07-01 PROCEDURE — 99213 OFFICE O/P EST LOW 20 MIN: CPT

## 2024-07-01 PROCEDURE — 73000 X-RAY EXAM OF COLLAR BONE: CPT | Mod: RT

## 2024-07-01 PROCEDURE — 99203 OFFICE O/P NEW LOW 30 MIN: CPT

## 2024-07-03 ENCOUNTER — APPOINTMENT (OUTPATIENT)
Dept: ORTHOPEDIC SURGERY | Facility: CLINIC | Age: 74
End: 2024-07-03
Payer: MEDICARE

## 2024-07-03 VITALS — BODY MASS INDEX: 21.47 KG/M2 | HEIGHT: 70 IN | WEIGHT: 150 LBS

## 2024-07-03 DIAGNOSIS — M22.2X2 PATELLOFEMORAL DISORDERS, LEFT KNEE: ICD-10-CM

## 2024-07-03 PROCEDURE — 99213 OFFICE O/P EST LOW 20 MIN: CPT

## 2024-08-12 DIAGNOSIS — I34.0 NONRHEUMATIC MITRAL (VALVE) INSUFFICIENCY: ICD-10-CM

## 2024-08-21 NOTE — ASU DISCHARGE PLAN (ADULT/PEDIATRIC) - C. MAKE IMPORTANT PERSONAL OR BUSINESS DECISIONS
Palliative Care Initial Visit         Date of Visit: 8/21/2024   Visit Made By: @[email or web address]@    Primary Care Physician: Joe Valencia MD  Office Phone #: [phone]  Fax Phone #: [phone]    Consults  Number of hospitalizations in the last year: 6  Last [facility] Discharge Date7/18/2024  Reason for Palliative Care: Complex Decision Making, Goals of Care, Pain/Symptom Management, and Psychosocial Support, Patient/Family      Subjective           History of Present Illness  History Obtained by: Medical Team, Chart Review, and Family    80 year old female with history of Prior glioblastoma on treatment but overall declined with recent trial of rehab but needing romy lift.   Pt has had progressive decline, prior d/w NeuroOnc re: hospice and dtr had declined at that time (2 months ago).    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   Limited movement left arm.     Palliative care for goals.           Past Medical History:   Diagnosis Date    Basal cell carcinoma of skin     face and nose    Cataract, bilateral     Colon polyps     DVT (deep venous thrombosis)  (CMD)     Glioblastoma  (CMD)     Glioblastoma multiforme  (CMD)     Hyperlipidemia     Hypertension     Insomnia     Lyme disease     Nasal vestibulitis     Pulmonary embolism  (CMD)     Reflex sympathetic dystrophy     Stress incontinence of urine        Past Surgical History:   Procedure Laterality Date    Appendectomy      Craniotomy Right 12/01/2023    [facility] - Dr. Hieu Amanda    Melaris analysis      Remv 2nd cataract,corn-scler sectn Bilateral     Skin cancer excision  2021    Basal cell excised       Past Social History   reports that she has never smoked. She has never been exposed to tobacco smoke. She has never used  smokeless tobacco. She reports that she does not drink alcohol and does not use drugs.        Past Spiritual/Cultural History  [place] Tradition/Christianity Affiliation: Judaism       Past Family History  family history includes COPD in her father; Cancer in her daughter; Cancer, Colon in her brother; Cancer, Lung in her brother.    Review of Systems   Constitutional:  Positive for activity change.   Psychiatric/Behavioral:  Positive for agitation and confusion. The patient is nervous/anxious.          Palliative Care Assessment Tools  Palliative Symptom Assessment Tool    Part 1.  How severe are your symptoms over the past week?    Pain?       Fatigue/tiredness?       Nausea or vomiting?            Lack of appetite?           Dry mouth?            Constipation?           Diarrhea?            Feeling sad/worried/upset?           Short of breath?          Difficulty getting around?           Falls?             Lack of remembering?         Numbness or tingling?            Part 2:  How have these symptoms interfered with your life?    Ability to do the things that you enjoy doing?         Ability to care for yourself?        Relations with other people?        Ability to work/household chores            Objective      Allergies    Allergies   Allergen Reactions    Prozac Other (See Comments) and RASH       Medications:  Medications Reviewed: Yes    On trazdone      Vital Signs:   Vital Last Value (24 Hour) 24 Hour Range   Temperature 97.5 °F (36.4 °C) (08/21/24 1212) Temp  Min: 97.5 °F (36.4 °C)  Max: 98.8 °F (37.1 °C)   Pulse 78 (08/21/24 1212) Pulse  Min: 65  Max: 101   Arterial   Blood Pressure   No data recorded   Non-Invasive  Blood Pressure (!) 142/75 (08/21/24 1212) BP  Min: 121/68  Max: 163/86   Central Venous Pressure   No data recorded   Respiratory 16 (08/21/24 0755) Resp  Min: 16  Max: 16   SpO2 92 % (08/21/24 1212) SpO2  Min: 92 %  Max: 93 %       Weight Trends    Wt Readings from Last 10 Encounters:    08/17/24 78.7 kg (173 lb 8 oz)   08/15/24 74.9 kg (165 lb 2 oz)   07/16/24 82 kg (180 lb 12.4 oz)   06/20/24 73.5 kg (162 lb 2.4 oz)   03/02/24 77.1 kg (170 lb)   02/29/24 69.5 kg (153 lb 3.5 oz)   02/15/24 73.9 kg (162 lb 14.7 oz)   01/19/24 69.4 kg (153 lb)   01/02/24 70.3 kg (155 lb)   12/26/23 66.2 kg (145 lb 15.1 oz)       Physical Exam  Vitals reviewed.   Constitutional:       Comments: Eyes open, slowly; some response t words and direction   HENT:      Head:      Comments: Non-traumatic     Mouth/Throat:      Mouth: Mucous membranes are dry.   Cardiovascular:      Rate and Rhythm: Normal rate.      Heart sounds: No murmur heard.  Pulmonary:      Effort: Pulmonary effort is normal. No respiratory distress.   Abdominal:      Palpations: Abdomen is soft.   Musculoskeletal:      Right lower leg: No edema.      Left lower leg: No edema.   Skin:     Coloration: Skin is pale.   Neurological:      Comments: Limited motion left arm, left leg;  moving right arm spon, limited right leg;  not always following commands.           Labs & Imaging    Recent Labs   Lab 08/21/24  0628 08/20/24  0553 08/19/24  0617 08/15/24  0523 08/15/24  0356 08/14/24  2106   SODIUM 140 141 141   < > 142 142   POTASSIUM 3.5 3.8 3.9   < > 4.1 4.3   CHLORIDE 107 108 110   < > 108 107   CO2 28 27 29   < > 33* 30   BUN 19 16 22*   < > 19 16   CREATININE 0.32* 0.31* 0.31*   < > 0.40* 0.42*   CALCIUM 8.7 8.8 9.5   < > 9.4 9.2   ALBUMIN  --   --   --   --  3.1* 3.1*   BILIRUBIN  --   --   --   --  0.4 0.5   ALKPT  --   --   --   --  88 97   GPT  --   --   --   --  45 39   AST  --   --   --   --  22 26   GLUCOSE 146* 139* 124*   < > 166* 107*    < > = values in this interval not displayed.      Recent Labs   Lab 08/21/24  0628 08/20/24  0553 08/19/24  0617   WBC 5.4 4.4 6.6   RBC 3.87* 3.61* 3.80*   HGB 12.7 11.9* 12.8   HCT 36.8 35.1* 37.1    171 209        Imaging Reviewed & Analyzed: Yes        MRI brain may also be increased extent of  the lesion inferiorly along the right corona radiata;  increased extent along portions of the corpus callosum where previously the disease process appears confined to the midportion of the body of the corpus callosum but now nearly extends to the isthmus and the anterior portion of the body near the genu.     Advance Care Planning/Goals of Care      ACP Document Review: POLST ([place]) In EMR  Current   Code Status: Selective Treatment/DNR    Medical Decision-making capacity: No  confused to cmmunicatin, location, events.   Substitute Decision Maker:  Power of  for Health Care dtr        Discussion: Pt unable: called dtr )mPOA)  Discussed overall complex medical condition, diagnosis with relevant labs, and imaging with recent course including progressive disease and functional decline  severe.    Discussed concern with changing issues of decline and advancing end of life.    Explored previous  baseline status bicycling 30 min 8 months ago and using romy lift 1 month ago: dtr is tearful in discussing and recognizing that pt \"is at the end, and I do feel I've done all I can do\"    Provided extensive emotional support.   Provided framework discussion of Best support for end of life care.    Dtr Understanding of condition - insight is god, she appreciates continuity of discussions between Neuro Onc and now Palliative.   Judgement of options and appropriate goals are growing.     Goals of Care:   Overall goals consistent with consideration of de-escalation of level of medical care to include hospice.   In discussion of resuscitation, reviewed recommendations of tDNR and comfort approach: dtr will sign now POLST completed now when she visits: d/w RN.   Also discussed support for comfort approach in hospice care.  Dtr wishes to cont care in hospital: discussed likely dc to facility soon with hospcie and she wishes Adv hospice: d/w RN, MD, CM.    Dtr will need to hear from hospice re: best placement: pt cannot go home  as dtr's  is quite sick.   Outcome/PLAN:  -rotate to comfort approach and discuss the support of hospice.     Other follow up needed.               Assessment      Gliblastoma with dz progressin: prior recs by NeuroOnc for hospice now pt is quite declined  Pain: not noted now  Agitation: quite significant now with freq calling out, verbalizing and trying some LE movement often:  needs support.      Plan          PAIN:  Current Regimen: tyl prn  AGITATION:  sig increase and ordered ativan prn - d/w RN         Total Time Spent today on this visit EXCLUDES time spent with Advance Care Planning  Total time spent today on this visit is 60 minutes which includes reviewing tests in preparation to see patient, obtaining and reviewing separately obtained history, performing a medically appropriate exam and evaluation, counseling and educating the patient/family/caregiver, and communicating with other healthcare professionals.  Additional 45m time spent in advance care planning.   Discussed With: pt, aaronr, MD, RN, CM    Thank you for involving Palliative Care.  Please contact the covering provider via [employer] [facility] ([place])/[facility] ([place]) with further questions or concerns.    @[email or web address]@     [phone]    Initial Note For Department Use Only        Primary Diagnosis: Cancer  Problem List/Pertinent Diagnosis: Oncologic-Other  LVAD: No  #1 Pre-Visit: Yes  #1 Post-Visit: Yes  #2 Pre-Visit: Yes  #2 Post-Visit: Yes  #3 Pre-Visit: Yes  #3 Post-Visit: Yes         Statement Selected

## 2024-09-04 ENCOUNTER — RX RENEWAL (OUTPATIENT)
Age: 74
End: 2024-09-04

## 2024-09-25 ENCOUNTER — APPOINTMENT (OUTPATIENT)
Dept: CARDIOLOGY | Facility: CLINIC | Age: 74
End: 2024-09-25
Payer: MEDICARE

## 2024-09-25 ENCOUNTER — NON-APPOINTMENT (OUTPATIENT)
Age: 74
End: 2024-09-25

## 2024-09-25 VITALS
HEART RATE: 53 BPM | BODY MASS INDEX: 20.37 KG/M2 | SYSTOLIC BLOOD PRESSURE: 110 MMHG | WEIGHT: 142 LBS | DIASTOLIC BLOOD PRESSURE: 60 MMHG | OXYGEN SATURATION: 96 %

## 2024-09-25 DIAGNOSIS — R94.31 ABNORMAL ELECTROCARDIOGRAM [ECG] [EKG]: ICD-10-CM

## 2024-09-25 PROCEDURE — G2211 COMPLEX E/M VISIT ADD ON: CPT

## 2024-09-25 PROCEDURE — 93306 TTE W/DOPPLER COMPLETE: CPT

## 2024-09-25 PROCEDURE — 93000 ELECTROCARDIOGRAM COMPLETE: CPT

## 2024-09-25 PROCEDURE — 99204 OFFICE O/P NEW MOD 45 MIN: CPT

## 2024-09-25 PROCEDURE — 99214 OFFICE O/P EST MOD 30 MIN: CPT

## 2024-09-25 NOTE — CARDIOLOGY SUMMARY
[de-identified] : 1/15/18:\par  He exercised for 11 minutes and 35 seconds of Jim protocol with no ECG changes or echocardiographic evidence of ischemia. His baseline echocardiogram shows mild to moderate mitral regurgitation and moderate pulmonic regurgitation with pulmonary pressures.\par  He may proceed with the planned surgery.\par

## 2024-09-25 NOTE — HISTORY OF PRESENT ILLNESS
[FreeTextEntry1] : He continues to exercise by running and biking. Clavicle injury. No other chest pian or dyspnea. To see Dr. Soto tomorrow. Labs then. No bleeding or bruising. Recent labs:  LDL 80 HDL 97 Prior: Has been feeling well. Biking 500 miles without difficulty. No chest pain or dyspnea. Recent LDL 89.  Prior: Ran 2 half marathons in the past 4 months. Runs a 10 to 15 minute mile. No chest pains or dyspnea.  Prior: He feels okay. Runs and bicycles as before. Feels fine. No SOB. No palpitations. No near syncope Tolerating aspirin/statin.  Prior: Thank you for referring him for cardiovascular evaluation prior to his planned wedge resection. 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 He underwent left lower lobectomy in August of 2017 without any difficulty. Approximately 12 days after the surgery he did have a syncopal episode after walking a 5K event. He underwent chemotherapy and reports feeling well overall. His exercise capacity has been stable throughout this time.. He denies any exertional shortness of breath or exertional chest pains. He does note some vague chest pains that occur randomly and are difficulty to describe. He has been treated for hyperlipidemia for many years with a statin and undergoes routine exercise stress testing at his internist's office. The most recent one was March of 2017. He was referred today because of an abnormal ECG. He exercises routinely including approximately running for 20 miles a week and bicycling for approximately 100 miles a week. He partakes in a state wide 400+ mile, 7 day race once a year. He denies any history of diabetes mellitus, hypertension, smoking or family history of premature coronary artery disease. He does have artery calcification mentioned as an aside on his chest CT. I reviewed these images and there is a very small area in the circumflex and RCA regions that might be consistent with coronary calcification.

## 2024-09-25 NOTE — DISCUSSION/SUMMARY
[FreeTextEntry1] : He is a 73-year-old with a history of hyperlipidemia and mild coronary calcification on CT. ECG: SB, LVH. CAD: No anginal symptoms with excellent exercise capacity. LDL was at goal. 89 tolerating aspirin and statin. Continue exercise and current regimen. MR: mild with MVP. Echo to monitor MR every year or two.   annual follow-up.  [EKG obtained to assist in diagnosis and management of assessed problem(s)] : EKG obtained to assist in diagnosis and management of assessed problem(s)

## 2024-09-26 ENCOUNTER — APPOINTMENT (OUTPATIENT)
Dept: UROLOGY | Facility: CLINIC | Age: 74
End: 2024-09-26
Payer: MEDICARE

## 2024-09-26 VITALS
HEART RATE: 56 BPM | BODY MASS INDEX: 20.04 KG/M2 | HEIGHT: 70 IN | DIASTOLIC BLOOD PRESSURE: 69 MMHG | OXYGEN SATURATION: 99 % | WEIGHT: 140 LBS | SYSTOLIC BLOOD PRESSURE: 132 MMHG | RESPIRATION RATE: 16 BRPM

## 2024-09-26 DIAGNOSIS — R35.1 NOCTURIA: ICD-10-CM

## 2024-09-26 DIAGNOSIS — N40.1 BENIGN PROSTATIC HYPERPLASIA WITH LOWER URINARY TRACT SYMPMS: ICD-10-CM

## 2024-09-26 DIAGNOSIS — N13.8 BENIGN PROSTATIC HYPERPLASIA WITH LOWER URINARY TRACT SYMPMS: ICD-10-CM

## 2024-09-26 DIAGNOSIS — Z12.5 ENCOUNTER FOR SCREENING FOR MALIGNANT NEOPLASM OF PROSTATE: ICD-10-CM

## 2024-09-26 PROCEDURE — 99203 OFFICE O/P NEW LOW 30 MIN: CPT

## 2024-09-26 PROCEDURE — 99213 OFFICE O/P EST LOW 20 MIN: CPT

## 2024-09-26 NOTE — PHYSICAL EXAM
[Normal Appearance] : normal appearance [Well Groomed] : well groomed [General Appearance - In No Acute Distress] : no acute distress [Edema] : no peripheral edema [Respiration, Rhythm And Depth] : normal respiratory rhythm and effort [Exaggerated Use Of Accessory Muscles For Inspiration] : no accessory muscle use [Abdomen Tenderness] : non-tender [Abdomen Soft] : soft [Costovertebral Angle Tenderness] : no ~M costovertebral angle tenderness [Urinary Bladder Findings] : the bladder was normal on palpation [Prostate Tenderness] : the prostate was not tender [No Prostate Nodules] : no prostate nodules [Prostate Size ___ gm] : prostate size [unfilled] gm [Normal Station and Gait] : the gait and station were normal for the patient's age [] : no rash [No Focal Deficits] : no focal deficits [Oriented To Time, Place, And Person] : oriented to person, place, and time [Affect] : the affect was normal [Mood] : the mood was normal [No Palpable Adenopathy] : no palpable adenopathy
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
## 2024-09-26 NOTE — ASSESSMENT
[FreeTextEntry1] : 72 yo M with BPH with LUTS. Voiding symptoms improved with alfuzosin. Will Continue.  For Nocturia, is bothersome but mostly managed with fluid management. Discussed possibly adding OAB medication such as Gemtesa. He declines for now but will consider it in the future if sx remain bothersome.  For prostate cancer screening, RUDY is acceptable. Discussed that we can stop prostate cancer screening given his age > 70 and PSA < 3 but he chooses to continue. Can RTO in 1 year or sooner prn

## 2024-11-04 NOTE — ASU PATIENT PROFILE, ADULT - NS PRO AD ANY ON CHART
Anesthesia Post Evaluation    Patient: Tanya Linda    Procedure(s) Performed: Procedure(s) (LRB):  EXCISION, PAROTID GLAND (Right)    Final Anesthesia Type: general      Patient location during evaluation: DOSC  Post-procedure vital signs: reviewed and stable  Airway patency: patent      Anesthetic complications: no      Cardiovascular status: hemodynamically stable  Respiratory status: spontaneous ventilation  Follow-up not needed.              Vitals Value Taken Time   /108 11/04/24 1517   Temp 36.8 °C (98.2 °F) 11/04/24 1325   Pulse 70 11/04/24 1524   Resp 18 11/04/24 1417   SpO2 100 % 11/04/24 1524   Vitals shown include unfiled device data.      Event Time   Out of Recovery 13:20:00         Pain/Jayda Score: Pain Rating Prior to Med Admin: 5 (11/4/2024  2:17 PM)  Jayda Score: 9 (11/4/2024  1:25 PM)          
No

## 2024-11-19 ENCOUNTER — OUTPATIENT (OUTPATIENT)
Dept: OUTPATIENT SERVICES | Facility: HOSPITAL | Age: 74
LOS: 1 days | End: 2024-11-19
Payer: COMMERCIAL

## 2024-11-19 ENCOUNTER — APPOINTMENT (OUTPATIENT)
Dept: CT IMAGING | Facility: CLINIC | Age: 74
End: 2024-11-19
Payer: COMMERCIAL

## 2024-11-19 DIAGNOSIS — Z98.49 CATARACT EXTRACTION STATUS, UNSPECIFIED EYE: Chronic | ICD-10-CM

## 2024-11-19 DIAGNOSIS — Z90.2 ACQUIRED ABSENCE OF LUNG [PART OF]: Chronic | ICD-10-CM

## 2024-11-19 DIAGNOSIS — R91.8 OTHER NONSPECIFIC ABNORMAL FINDING OF LUNG FIELD: ICD-10-CM

## 2024-11-19 DIAGNOSIS — Z98.890 OTHER SPECIFIED POSTPROCEDURAL STATES: Chronic | ICD-10-CM

## 2024-11-19 DIAGNOSIS — Z90.89 ACQUIRED ABSENCE OF OTHER ORGANS: Chronic | ICD-10-CM

## 2024-11-19 PROCEDURE — 71250 CT THORAX DX C-: CPT | Mod: 26

## 2024-11-19 PROCEDURE — 71250 CT THORAX DX C-: CPT

## 2024-11-26 ENCOUNTER — APPOINTMENT (OUTPATIENT)
Dept: THORACIC SURGERY | Facility: CLINIC | Age: 74
End: 2024-11-26
Payer: MEDICARE

## 2024-11-26 VITALS
HEIGHT: 70 IN | OXYGEN SATURATION: 100 % | BODY MASS INDEX: 20.04 KG/M2 | SYSTOLIC BLOOD PRESSURE: 111 MMHG | WEIGHT: 140 LBS | HEART RATE: 57 BPM | RESPIRATION RATE: 17 BRPM | DIASTOLIC BLOOD PRESSURE: 61 MMHG

## 2024-11-26 DIAGNOSIS — C34.32 MALIGNANT NEOPLASM OF LOWER LOBE, LEFT BRONCHUS OR LUNG: ICD-10-CM

## 2024-11-26 DIAGNOSIS — C34.91 MALIGNANT NEOPLASM OF UNSPECIFIED PART OF RIGHT BRONCHUS OR LUNG: ICD-10-CM

## 2024-11-26 PROCEDURE — 99213 OFFICE O/P EST LOW 20 MIN: CPT

## 2024-11-26 PROCEDURE — G2211 COMPLEX E/M VISIT ADD ON: CPT

## 2024-11-26 PROCEDURE — 99203 OFFICE O/P NEW LOW 30 MIN: CPT

## 2025-03-31 NOTE — H&P ADULT - EYES
detailed exam EOMI/conjunctiva clear/PERRL Nsaids Pregnancy And Lactation Text: These medications are considered safe up to 30 weeks gestation. It is excreted in breast milk.

## 2025-04-21 ENCOUNTER — OFFICE (OUTPATIENT)
Facility: LOCATION | Age: 75
Setting detail: OPHTHALMOLOGY
End: 2025-04-21
Payer: COMMERCIAL

## 2025-04-21 DIAGNOSIS — H52.4: ICD-10-CM

## 2025-04-21 DIAGNOSIS — H26.493: ICD-10-CM

## 2025-04-21 DIAGNOSIS — H01.001: ICD-10-CM

## 2025-04-21 DIAGNOSIS — H43.813: ICD-10-CM

## 2025-04-21 DIAGNOSIS — H35.413: ICD-10-CM

## 2025-04-21 PROCEDURE — 92015 DETERMINE REFRACTIVE STATE: CPT | Performed by: OPHTHALMOLOGY

## 2025-04-21 PROCEDURE — 92014 COMPRE OPH EXAM EST PT 1/>: CPT | Performed by: OPHTHALMOLOGY

## 2025-04-21 PROCEDURE — 92250 FUNDUS PHOTOGRAPHY W/I&R: CPT | Performed by: OPHTHALMOLOGY

## 2025-04-21 ASSESSMENT — REFRACTION_CURRENTRX
OS_CYLINDER: -0.25
OD_CYLINDER: -0.25
OD_VPRISM_DIRECTION: BF
OD_SPHERE: -0.50
OS_OVR_VA: 20/
OD_AXIS: 030
OD_OVR_VA: 20/
OS_CYLINDER: -0.25
OS_VPRISM_DIRECTION: BF
OS_SPHERE: -1.00
OD_VPRISM_DIRECTION: SV
OD_OVR_VA: 20/
OS_VPRISM_DIRECTION: SV
OD_AXIS: 172
OD_SPHERE: -0.50
OS_SPHERE: -1.00
OS_ADD: +2.75
OD_CYLINDER: -0.25
OS_OVR_VA: 20/
OS_AXIS: 014
OS_AXIS: 011
OD_ADD: +2.75

## 2025-04-21 ASSESSMENT — REFRACTION_TRIALFRAME
OD_ADD: +2.50
OS_ADD: +2.50
OD_CYLINDER: -0.50
OS_CYLINDER: -0.75
OS_AXIS: 100
OD_AXIS: 085
OD_SPHERE: -0.25
OS_SPHERE: -0.75

## 2025-04-21 ASSESSMENT — CONFRONTATIONAL VISUAL FIELD TEST (CVF)
OD_FINDINGS: FULL
OS_FINDINGS: FULL

## 2025-04-21 ASSESSMENT — REFRACTION_MANIFEST
OS_ADD: +2.50
OD_SPHERE: -0.50
OS_SPHERE: -0.50
OD_VA1: 20/20
OD_SPHERE: PLANO
OD_SPHERE: -0.50
OD_CYLINDER: -0.25
OD_ADD: +2.50
OD_ADD: +2.50
OS_ADD: +2.50
OS_SPHERE: -1.00
OD_ADD: +2.50
OS_ADD: +2.50
OS_CYLINDER: -0.75
OD_AXIS: 075
OS_AXIS: 100
OS_VA1: 20/20
OS_SPHERE: -1.00

## 2025-04-21 ASSESSMENT — KERATOMETRY
OS_K1POWER_DIOPTERS: 43.50
OD_K1POWER_DIOPTERS: 43.00
OS_AXISANGLE_DEGREES: 008
OS_K2POWER_DIOPTERS: 44.25
OD_AXISANGLE_DEGREES: 008
OD_K2POWER_DIOPTERS: 43.50

## 2025-04-21 ASSESSMENT — LID EXAM ASSESSMENTS
OS_BLEPHARITIS: LLL LUL 2+
OD_BLEPHARITIS: RLL RUL 2+

## 2025-04-21 ASSESSMENT — REFRACTION_AUTOREFRACTION
OS_AXIS: 100
OD_AXIS: 039
OS_CYLINDER: -0.75
OD_CYLINDER: -0.25
OD_SPHERE: -0.25
OS_SPHERE: -1.00

## 2025-04-21 ASSESSMENT — VISUAL ACUITY
OD_BCVA: 20/25-2
OS_BCVA: 20/20

## 2025-04-30 ENCOUNTER — NON-APPOINTMENT (OUTPATIENT)
Age: 75
End: 2025-04-30

## 2025-05-02 ENCOUNTER — NON-APPOINTMENT (OUTPATIENT)
Age: 75
End: 2025-05-02

## 2025-05-02 ENCOUNTER — APPOINTMENT (OUTPATIENT)
Dept: CARDIOLOGY | Facility: CLINIC | Age: 75
End: 2025-05-02
Payer: MEDICARE

## 2025-05-02 VITALS
DIASTOLIC BLOOD PRESSURE: 64 MMHG | OXYGEN SATURATION: 99 % | BODY MASS INDEX: 20.81 KG/M2 | WEIGHT: 145 LBS | HEART RATE: 56 BPM | SYSTOLIC BLOOD PRESSURE: 128 MMHG

## 2025-05-02 DIAGNOSIS — I70.90 UNSPECIFIED ATHEROSCLEROSIS: ICD-10-CM

## 2025-05-02 PROCEDURE — 99214 OFFICE O/P EST MOD 30 MIN: CPT | Mod: 25

## 2025-05-02 PROCEDURE — 93000 ELECTROCARDIOGRAM COMPLETE: CPT

## 2025-05-16 ENCOUNTER — APPOINTMENT (OUTPATIENT)
Dept: CT IMAGING | Facility: CLINIC | Age: 75
End: 2025-05-16

## 2025-05-16 PROCEDURE — 71250 CT THORAX DX C-: CPT

## 2025-05-27 ENCOUNTER — APPOINTMENT (OUTPATIENT)
Dept: THORACIC SURGERY | Facility: CLINIC | Age: 75
End: 2025-05-27
Payer: MEDICARE

## 2025-05-27 DIAGNOSIS — R91.8 OTHER NONSPECIFIC ABNORMAL FINDING OF LUNG FIELD: ICD-10-CM

## 2025-05-27 DIAGNOSIS — C34.91 MALIGNANT NEOPLASM OF UNSPECIFIED PART OF RIGHT BRONCHUS OR LUNG: ICD-10-CM

## 2025-05-27 DIAGNOSIS — C80.1 MALIGNANT (PRIMARY) NEOPLASM, UNSPECIFIED: ICD-10-CM

## 2025-05-27 PROCEDURE — 99213 OFFICE O/P EST LOW 20 MIN: CPT | Mod: 93

## 2025-05-29 ENCOUNTER — APPOINTMENT (OUTPATIENT)
Dept: CARDIOLOGY | Facility: CLINIC | Age: 75
End: 2025-05-29

## 2025-06-23 ENCOUNTER — APPOINTMENT (OUTPATIENT)
Dept: CARDIOLOGY | Facility: CLINIC | Age: 75
End: 2025-06-23
Payer: MEDICARE

## 2025-06-23 PROCEDURE — 93351 STRESS TTE COMPLETE: CPT

## 2025-08-12 ENCOUNTER — APPOINTMENT (OUTPATIENT)
Dept: CT IMAGING | Facility: CLINIC | Age: 75
End: 2025-08-12
Payer: MEDICARE

## 2025-08-12 PROCEDURE — 71250 CT THORAX DX C-: CPT

## 2025-08-26 ENCOUNTER — APPOINTMENT (OUTPATIENT)
Dept: THORACIC SURGERY | Facility: CLINIC | Age: 75
End: 2025-08-26
Payer: MEDICARE

## 2025-08-26 VITALS
HEIGHT: 70 IN | DIASTOLIC BLOOD PRESSURE: 58 MMHG | HEART RATE: 49 BPM | RESPIRATION RATE: 17 BRPM | OXYGEN SATURATION: 98 % | WEIGHT: 145 LBS | SYSTOLIC BLOOD PRESSURE: 116 MMHG | BODY MASS INDEX: 20.76 KG/M2

## 2025-08-26 DIAGNOSIS — C34.91 MALIGNANT NEOPLASM OF UNSPECIFIED PART OF RIGHT BRONCHUS OR LUNG: ICD-10-CM

## 2025-08-26 DIAGNOSIS — R91.8 OTHER NONSPECIFIC ABNORMAL FINDING OF LUNG FIELD: ICD-10-CM

## 2025-08-26 PROCEDURE — 99213 OFFICE O/P EST LOW 20 MIN: CPT

## 2025-09-08 ENCOUNTER — APPOINTMENT (OUTPATIENT)
Dept: ORTHOPEDIC SURGERY | Facility: CLINIC | Age: 75
End: 2025-09-08

## 2025-09-16 ENCOUNTER — RX RENEWAL (OUTPATIENT)
Age: 75
End: 2025-09-16

## (undated) DEVICE — DRAPE 3/4 SHEET 52X76"

## (undated) DEVICE — PREP CHLORAPREP HI-LITE ORANGE 26ML

## (undated) DEVICE — DRAPE C ARM UNIVERSAL

## (undated) DEVICE — DRAPE C ARM 41X140"

## (undated) DEVICE — Device

## (undated) DEVICE — DRAPE TOP SHEET 53" X 101"

## (undated) DEVICE — VENODYNE/SCD SLEEVE CALF MEDIUM

## (undated) DEVICE — DRAPE IOBAN 23" X 23"

## (undated) DEVICE — GLV 8 PROTEXIS (BLUE)

## (undated) DEVICE — DRAPE INSTRUMENT POUCH 6.75" X 11"

## (undated) DEVICE — DRSG CURITY GAUZE SPONGE 4 X 4" 12-PLY

## (undated) DEVICE — DRILL BIT SYNTHES ORTHO 2.0MM W DEPTH MARK QC 110MM

## (undated) DEVICE — GLV 7.5 PROTEXIS (WHITE)

## (undated) DEVICE — ELCTR STRYKER NEPTUNE SMOKE EVACUATION PENCIL (GREEN)

## (undated) DEVICE — SUT PDS II 0 36" CT-1

## (undated) DEVICE — SUT VICRYL 3-0 18" RB-1 (POP-OFF)

## (undated) DEVICE — DRSG COMBINE 5X9"

## (undated) DEVICE — PACK LOWER EXTREMITY

## (undated) DEVICE — WARMING BLANKET LOWER ADULT

## (undated) DEVICE — SUT MONOCRYL 4-0 18" P-3 UNDYED

## (undated) DEVICE — DRAPE SPLIT SHEET 77" X 108"